# Patient Record
Sex: MALE | Race: WHITE
[De-identification: names, ages, dates, MRNs, and addresses within clinical notes are randomized per-mention and may not be internally consistent; named-entity substitution may affect disease eponyms.]

---

## 2019-10-23 ENCOUNTER — HOSPITAL ENCOUNTER (OUTPATIENT)
Dept: HOSPITAL 11 - JP.ED | Age: 84
Setting detail: OBSERVATION
LOS: 2 days | Discharge: HOME | End: 2019-10-25
Attending: INTERNAL MEDICINE | Admitting: INTERNAL MEDICINE
Payer: MEDICARE

## 2019-10-23 DIAGNOSIS — Z79.899: ICD-10-CM

## 2019-10-23 DIAGNOSIS — M19.90: ICD-10-CM

## 2019-10-23 DIAGNOSIS — Z79.82: ICD-10-CM

## 2019-10-23 DIAGNOSIS — I25.10: ICD-10-CM

## 2019-10-23 DIAGNOSIS — E86.0: ICD-10-CM

## 2019-10-23 DIAGNOSIS — I10: ICD-10-CM

## 2019-10-23 DIAGNOSIS — Z87.891: ICD-10-CM

## 2019-10-23 DIAGNOSIS — K52.9: Primary | ICD-10-CM

## 2019-10-23 PROCEDURE — 71046 X-RAY EXAM CHEST 2 VIEWS: CPT

## 2019-10-23 PROCEDURE — 85025 COMPLETE CBC W/AUTO DIFF WBC: CPT

## 2019-10-23 PROCEDURE — 93010 ELECTROCARDIOGRAM REPORT: CPT

## 2019-10-23 PROCEDURE — 80048 BASIC METABOLIC PNL TOTAL CA: CPT

## 2019-10-23 PROCEDURE — 90662 IIV NO PRSV INCREASED AG IM: CPT

## 2019-10-23 PROCEDURE — C9113 INJ PANTOPRAZOLE SODIUM, VIA: HCPCS

## 2019-10-23 PROCEDURE — 87804 INFLUENZA ASSAY W/OPTIC: CPT

## 2019-10-23 PROCEDURE — 99217: CPT

## 2019-10-23 PROCEDURE — 96374 THER/PROPH/DIAG INJ IV PUSH: CPT

## 2019-10-23 PROCEDURE — 85027 COMPLETE CBC AUTOMATED: CPT

## 2019-10-23 PROCEDURE — 74176 CT ABD & PELVIS W/O CONTRAST: CPT

## 2019-10-23 PROCEDURE — 96361 HYDRATE IV INFUSION ADD-ON: CPT

## 2019-10-23 PROCEDURE — G0008 ADMIN INFLUENZA VIRUS VAC: HCPCS

## 2019-10-23 PROCEDURE — 36415 COLL VENOUS BLD VENIPUNCTURE: CPT

## 2019-10-23 PROCEDURE — 99225: CPT

## 2019-10-23 PROCEDURE — 81001 URINALYSIS AUTO W/SCOPE: CPT

## 2019-10-23 PROCEDURE — 83690 ASSAY OF LIPASE: CPT

## 2019-10-23 PROCEDURE — 84484 ASSAY OF TROPONIN QUANT: CPT

## 2019-10-23 PROCEDURE — 80053 COMPREHEN METABOLIC PANEL: CPT

## 2019-10-23 PROCEDURE — G0378 HOSPITAL OBSERVATION PER HR: HCPCS

## 2019-10-23 PROCEDURE — 93005 ELECTROCARDIOGRAM TRACING: CPT

## 2019-10-23 PROCEDURE — 51702 INSERT TEMP BLADDER CATH: CPT

## 2019-10-23 PROCEDURE — 99285 EMERGENCY DEPT VISIT HI MDM: CPT

## 2019-10-23 PROCEDURE — 99283 EMERGENCY DEPT VISIT LOW MDM: CPT

## 2019-10-23 PROCEDURE — 99218: CPT

## 2019-10-23 RX ADMIN — Medication SCH EACH: at 20:32

## 2019-10-23 RX ADMIN — HYDROCODONE BITARTRATE AND ACETAMINOPHEN PRN TAB: 10; 325 TABLET ORAL at 17:42

## 2019-10-23 RX ADMIN — ONDANSETRON PRN MG: 4 TABLET, ORALLY DISINTEGRATING ORAL at 17:41

## 2019-10-23 RX ADMIN — Medication SCH MG: at 20:32

## 2019-10-23 NOTE — PCM.HP.2
H&P History of Present Illness





- General


Date of Service: 10/23/19


Admit Problem/Dx: 


 Admission Diagnosis/Problem





Admission Diagnosis/Problem      Gastroenteritis








Source of Information: Patient, Provider


History Limitations: Reports: No Limitations





- History of Present Illness


Initial Comments - Free Text/Narative: 





CC: I've had better days





HPI: Al presents today with several days of nasal congestion, shortness of 

breath, diarrhea and upper abdominal pressure. He first got sick on Thursday, 

just shy of one week ago. Initial symptoms were mostly upper respiratory with a 

mild cough and nasal congestion. He has developed mild shortness of breath but 

this seems to be getting better. Over the last 3 days he's had abdominal 

discomfort with a pressure-like pain in the upper abdomen. The pain does not 

radiate. The pain gets worse when he tries to eat and is better with belching 

and fasting. He has been taking pain pills but they don't seem to help the pain 

very much. He has nausea but has not vomited. He has had several watery bowel 

movements each day for the past few days. He has subjective fevers but 

temperatures measured at home have been normal. Appetite has been decreased in 

intake has been decreased. No complaints of chest pain. No change in bladder 

habits. He has mild lower extremity edema which is stable. He did have sick 

contacts with 2 grandchildren who he took care of her in the middle of last 

week before symptoms started.





Laboratory workup in the emergency room was reassuring. Chest x-ray was clear. 

CT of the abdomen and pelvis did show some fluid-filled portions of the small 

intestine concerning for ileus or possibly gastroenteritis. Patient will be 

admitted for hydration, symptom management and observation.





- Related Data


Allergies/Adverse Reactions: 


 Allergies











Allergy/AdvReac Type Severity Reaction Status Date / Time


 


No Known Allergies Allergy   Verified 04/12/16 16:51











Home Medications: 


 Home Meds





Aspirin 325 mg PO DAILY 05/31/14 [History]


Diltiazem HCl [Diltiazem 12Hr ER] 90 mg PO BID 05/31/14 [History]


Lisinopril/Hydrochlorothiazide [Lisinopril-Hctz 20-12.5 mg Tab] 1 each PO BID 05 /31/14 [History]


Metoprolol Succinate [Toprol XL 50mg] 1 tab PO DAILY 05/31/14 [History]


Nitroglycerin 0.4 mg SL ASDIRECTED PRN 05/31/14 [History]


Simvastatin 1 tab PO BEDTIME 05/31/14 [History]


Omeprazole 40 mg PO DAILY 04/12/16 [History]


Hydrocodone/Acetaminophen [Hydrocodon-Acetaminophn ] 2 tab PO Q8H PRN 10/

23/19 [History]


LORazepam 0.5 mg PO Q8H PRN 10/23/19 [History]











Past Medical History


HEENT History: Reports: Impaired Vision


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


Gastrointestinal History: Reports: Diverticulosis


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


Musculoskeletal History: Reports: Arthritis, Back Pain, Chronic


Other Oncologic History: skin


Other Dermatologic History: skin cancer





- Infectious Disease History


Infectious Disease History: Reports: Chicken Pox, Measles, Mumps





- Past Surgical History


Cardiovascular Surgical History: Reports: Valve Replacement, Other (See Below)


GI Surgical History: Reports: Appendectomy, Cholecystectomy, Colonoscopy, EGD, 

Hernia Repair/Other





Social & Family History





- Family History


Cardiac: Reports: CAD, MI


: Reports: Renal Disease/Insufficiency


Oncologic: Reports: Brain





- Tobacco Use


Smoking Status *Q: Former Smoker


Used Tobacco, but Quit: Yes


Month/Year Tobacco Last Used: many years ago





- Caffeine Use


Caffeine Use: Reports: Coffee


Caffeine Use Comment: rarely





- Recreational Drug Use


Recreational Drug Use: No





H&P Review of Systems





- Review of Systems:


Review Of Systems: See Below


Free Text/Narrative: 





A complete 12 point review of systems was obtained.  Pertinent positives and 

negatives are noted in the history of present illness.  All other systems were 

reviewed and were negative except as noted.





Exam





- Exam


Exam: See Below





- Vital Signs


Vital Signs: 


 Last Vital Signs











Temp  36.4 C   10/23/19 10:11


 


Pulse  89   10/23/19 10:39


 


Resp  11 L  10/23/19 10:39


 


BP  143/70 H  10/23/19 10:39


 


Pulse Ox  97   10/23/19 10:39











Weight: 61.235 kg





- Exam


Quality Assessment: No: Supplemental Oxygen


General: Alert, Oriented, Cooperative.  No: Mild Distress


HEENT: Pupils Equal.  No: Mucosa Moist & Pink (dry), Scleral Icterus


Neck: Supple, Trachea Midline.  No: Lymphadenopathy


Lungs: Clear to Auscultation, Normal Respiratory Effort


Cardiovascular: Regular Rate, Regular Rhythm, Systolic Murmur


GI/Abdominal Exam: Normal Bowel Sounds, Soft, No Distention, Tender (moderate, 

generalized )


Back Exam: Normal Inspection, Full Range of Motion


Extremities: Pedal Edema.  No: Increased Warmth


Skin: Warm, Dry, Wound (2 cm scab on left anterior shin )


Neuro Extensive - Mental Status: Alert, Oriented x3, Nl Response to Commands


Neuro Extensive - Motor, Sensory, Reflexes: No: Dysarthria, Abnormal Motor, 

Tremor


Psychiatric: Alert, Normal Affect





- Patient Data


Lab Results Last 24 hrs: 


 Laboratory Results - last 24 hr











  10/23/19 10/23/19 10/23/19 Range/Units





  10:35 10:50 11:05 


 


WBC  9.5    (4.5-11.0)  K/uL


 


RBC  3.93 L    (4.30-5.90)  M/uL


 


Hgb  11.6 L    (12.0-15.0)  g/dL


 


Hct  35.6 L    (40.0-54.0)  %


 


MCV  91    (80-98)  fL


 


MCH  30    (27-31)  pg


 


MCHC  33    (32-36)  %


 


Plt Count  235    (150-400)  K/uL


 


Neut % (Auto)  82 H    (36-66)  %


 


Lymph % (Auto)  7 L    (24-44)  %


 


Mono % (Auto)  11 H    (2-6)  %


 


Eos % (Auto)  0 L    (2-4)  %


 


Baso % (Auto)  0    (0-1)  %


 


Sodium   133 L   (140-148)  mmol/L


 


Potassium   3.9   (3.6-5.2)  mmol/L


 


Chloride   95 L   (100-108)  mmol/L


 


Carbon Dioxide   28   (21-32)  mmol/L


 


Anion Gap   13.9   (5.0-14.0)  mmol/L


 


BUN   17   (7-18)  mg/dL


 


Creatinine   1.2   (0.8-1.3)  mg/dL


 


Est Cr Clr Drug Dosing   38.98   mL/min


 


Estimated GFR (MDRD)   58 L   (>60)  


 


Glucose   127 H   ()  mg/dL


 


Calcium   9.1   (8.5-10.1)  mg/dL


 


Total Bilirubin   1.5 H D   (0.2-1.0)  mg/dL


 


AST   18   (15-37)  U/L


 


ALT   16   (12-78)  U/L


 


Alkaline Phosphatase   83   ()  U/L


 


Troponin I   < 0.017   (0.000-0.056)  ng/mL


 


Total Protein   7.5   (6.4-8.2)  g/dL


 


Albumin   3.7   (3.4-5.0)  g/dL


 


Globulin   3.8 H   (2.3-3.5)  g/dL


 


Albumin/Globulin Ratio   1.0 L   (1.2-2.2)  


 


Lipase   55 L   ()  U/L


 


Urine Color    Yellow  (YELLOW)  


 


Urine Appearance    Clear  (CLEAR)  


 


Urine pH    7.0  (5.0-8.0)  


 


Ur Specific Gravity    1.015  (1.008-1.030)  


 


Urine Protein    Negative  (NEGATIVE)  mg/dL


 


Urine Glucose (UA)    Negative  (NEGATIVE)  mg/dL


 


Urine Ketones    Negative  (NEGATIVE)  mg/dL


 


Urine Occult Blood    Small H  (NEGATIVE)  


 


Urine Nitrite    Negative  (NEGATIVE)  


 


Urine Bilirubin    Negative  (NEGATIVE)  


 


Urine Urobilinogen    0.2  (0.2-1.0)  EU/dL


 


Ur Leukocyte Esterase    Negative  (NEGATIVE)  


 


Urine RBC    0-5  (0-5)  


 


Urine WBC    Not seen  (0-5)  


 


Ur Epithelial Cells    Rare  


 


Amorphous Sediment    Not seen  


 


Urine Bacteria    Not seen  


 


Urine Mucus    Not seen  











Result Diagrams: 


 10/23/19 10:35





 10/23/19 10:50


Andreas Results Last 24 hrs: 


 Microbiology











 10/23/19 10:42 Influenza Type A Antigen Screen - Final





 Nasal, Unspecified    NEGATIVE INFLUENZA A VIRUS AG





    REFERENCE RANGE: NEGATIVE





 Influenza Type B Antigen Screen - Final





    NEGATIVE INFLUENZA B VIRUS AG





    REFERENCE RANGE: NEGATIVE











Imaging Impressions Last 24 hrs: 





CXR - images personally reviewed - lungs are clear, no mass, infiltrate or 

effusion. s/p sternotomy 





EKG INTERPRETATION


EKG Date: 10/23/19


Rhythm: NSR


Rate (Beats/Min): 83


Axis: RAD-Right Axis Deviation


QRS: RBBB


ST-T: Normal


QT: Normal


Comparison: No Change





*Q Meaningful Use (ADM)





- VTE Risk Assess *Q


Each Risk Factor Represents 1 Point: Swollen Legs, Current


Total Score 1 Point Risk Factors: 1


Each Risk Factor Represents 2 Points: None


Total Score 2 Point Risk Factors: 0


Each Risk Factor Represents 3 Points: Age 75 Years or Greater


Total Score 3 Point Risk Factors: 3


Each Risk Factor Represents 5 Points: None


Total Score 5 Point Risk Factors: 0


Venous Thromboembolism Risk Factor Score *Q: 4





- Problem List


(1) Gastroenteritis


SNOMED Code(s): 35920514


   ICD Code: K52.9 - NONINFECTIVE GASTROENTERITIS AND COLITIS, UNSPECIFIED   

Status: Acute   Current Visit: Yes   


Problem List Initiated/Reviewed/Updated: Yes


Orders Last 24hrs: 


 Active Orders 24 hr











 Category Date Time Status


 


 Patient Status Manage Transfer [TRANSFER] Routine ADT  10/23/19 13:31 Active


 


 EKG Documentation Completion [RC] ASDIRECTED Care  10/23/19 10:36 Active


 


 Sodium Chloride 0.9% [Normal Saline] 1,000 ml Med  10/23/19 13:45 Active





 IV ASDIRECTED   


 


 Resuscitation Status Routine Resus Stat  10/23/19 13:32 Ordered


 


 EKG 12 Lead [EK] Routine Ther  10/23/19 10:35 Ordered








 Medication Orders





Sodium Chloride (Normal Saline)  1,000 mls @ 125 mls/hr IV ASDIRECTED REGINO








Assessment/Plan Comment:: 





ASSESSMENT AND PLAN - 





Gastroenteritis - manifestations including nausea and diarrhea as well as 

abdominal pain. Labs are reassuring. CT scan consistent with gastroenteritis 

versus possibly ileus. Patient is dehydrated but not acutely ill and vitals are 

otherwise stable.


-IV fluid hydration


-Pain control


-Nausea management


-Advance diet as tolerated





Coronary artery disease - long-standing history. No active symptoms. Troponin 

normal.


-Continue medical management





Maintenance issues - 


- DVT prophylaxis - CYNTHIA stockings


- GI prophylaxis - PPI


- Nutrition - mechanical soft


- Pastor catheter - not indicated





CODE STATUS - DNR/DNI





Admission justification - patient will be referred observation status for 

hydration and symptom management





Disposition - I would anticipate discharge home after the hospital stay





Primary care physician - Dr Jamaal Gimenez M.D.





- Mortality Measure


Prognosis:: Good

## 2019-10-23 NOTE — CRLCR
INDICATION: Dyspnea



Indication:



Dyspnea. 



Technique:



Chest, two views. 



Comparison:



12/29/2016. 



Findings:



Median sternotomy changes, with mediastinal surgical clips, compatible with 

prior CABG. There is diffuse pulmonary hyperinflation. There is no acute 

airspace disease. There is no pneumothorax. Lateral/posterior costophrenic 

sulci are sharp. Bowel gas pattern is normal in the upper abdomen. Heart 

size and pulmonary vasculature are normal. 



Impression:



1. Diffuse pulmonary hyperinflation. 



2. No acute airspace disease.



Dictated by Rickey Richardson MD @ 10/23/2019 11:01:22 AM



Dictated by: Rickey Richardson MD @ 10/23/2019 11:01:46



(Electronically Signed)

## 2019-10-23 NOTE — CRLCT
INDICATION:



Abdominal pain.



TECHNIQUE:



Noncontrast CT abdomen and pelvis. Coronal and sagittal re-formatted images 

obtained.



Comparison: No comparison studies are available 



Findings: 



The heart size is normal. There is no pericardial effusion or pleural 

effusion. Basilar strandy atelectasis.



The unenhanced spleen, pancreas, adrenal glands unenhanced liver appears 

unremarkable. Cholecystectomy there is biliary dilatation.



Left kidney appears atrophic. Nonobstructing small renal calculi on the 

right. No hydronephrosis.



Diverticulosis. Multiple gas and fluid filled mildly distended small bowel 

loops. No transition point identified. Findings probably reflect ileus. 



Multiple surgical clips in the in the right inguinal region could be 

related to prior inguinal hernia repair. No suspicious bony lesions. 



Enlarged prostate gland measuring 6.6 centimeters. Trabeculated appearance 

of the urinary bladder with multiple small diverticula. No suspicious bony 

lesions. 



Impression :



1. Diffuse scattered fluid-filled mildly distended small bowel loops. No 

transition point identified findings probably reflect ileus. Repeat imaging 

could be performed if patient`s symptoms worsen.



2. Diverticulosis .



3. Enlarged prostate gland with trabeculated appearance of the urinary 

bladder with multiple small diverticula likely related to bladder outlet 

obstruction



Please note that all CT scans at this facility use dose modulation, 

iterative reconstruction, and/or weight-based dosing when appropriate to 

reduce radiation dose to as low as reasonably achievable.



Dictated by Ro Ward MD @ Oct 23 2019 12:07PM



Signed by Dr. Ro Ward @ Oct 23 2019 12:18PM

## 2019-10-23 NOTE — EDM.PDOC
ED HPI GENERAL MEDICAL PROBLEM





- General


Chief Complaint: General


Stated Complaint: MEDICAL VIA NORTH


Time Seen by Provider: 10/23/19 10:25


Source of Information: Reports: Patient, EMS


History Limitations: Reports: No Limitations





- History of Present Illness


INITIAL COMMENTS - FREE TEXT/NARRATIVE: 





85-year-old male brought in by EMS this morning after struggling with GI 

symptoms such as nausea, diarrhea, abdominal cramps and also cold symptoms with 

cough, runny nose for the past 2-3 days but today developed some chest pressure 

which scared him. He called EMS, and EKG looks stable from previous EKGs. He 

had taken some nitroglycerin prior to EMS arriving and was feeling better but 

had a few episodes in route of brief epigastric and substernal fullness with no 

change in vitals or EKG monitoring. While visiting with the patient he had 

another episode of one to two minutes of a fullness substernally. He felt 

uncomfortable yesterday with "sweats, I felt hot".


Onset: Gradual


Duration: Day(s): (Intermittent symptoms for the past 3-4 days)


Location: Reports: Chest, Abdomen


Associated Symptoms: Reports: Chest Pain, Fever/Chills, Malaise, Shortness of 

Breath, Other (Rhinitis, diarrhea)


  ** Lower Back


Pain Score (Numeric/FACES): 8





- Related Data


 Allergies











Allergy/AdvReac Type Severity Reaction Status Date / Time


 


No Known Allergies Allergy   Verified 04/12/16 16:51











Home Meds: 


 Home Meds





Aspirin 325 mg PO DAILY 05/31/14 [History]


Diltiazem HCl [Diltiazem 12Hr ER] 90 mg PO BID 05/31/14 [History]


Lisinopril/Hydrochlorothiazide [Lisinopril-Hctz 20-12.5 mg Tab] 1 each PO BID 05 /31/14 [History]


Metoprolol Succinate [Toprol XL 50mg] 1 tab PO DAILY 05/31/14 [History]


Nitroglycerin 0.4 mg SL ASDIRECTED PRN 05/31/14 [History]


Simvastatin 1 tab PO BEDTIME 05/31/14 [History]


Omeprazole 40 mg PO DAILY 04/12/16 [History]


Hydrocodone/Acetaminophen [Hydrocodon-Acetaminophn ] 2 tab PO Q8H PRN 10/

23/19 [History]


LORazepam 0.5 mg PO Q8H PRN 10/23/19 [History]











Past Medical History


HEENT History: Reports: Impaired Vision


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


Gastrointestinal History: Reports: Diverticulosis


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


Musculoskeletal History: Reports: Arthritis, Back Pain, Chronic


Other Oncologic History: skin


Other Dermatologic History: skin cancer





- Infectious Disease History


Infectious Disease History: Reports: Chicken Pox, Measles, Mumps





- Past Surgical History


Cardiovascular Surgical History: Reports: Valve Replacement, Other (See Below)


GI Surgical History: Reports: Appendectomy, Cholecystectomy, Colonoscopy, EGD, 

Hernia Repair/Other





Social & Family History





- Family History


Cardiac: Reports: CAD, MI


: Reports: Renal Disease/Insufficiency


Oncologic: Reports: Brain





- Tobacco Use


Smoking Status *Q: Former Smoker


Used Tobacco, but Quit: Yes


Month/Year Tobacco Last Used: many years ago





- Caffeine Use


Caffeine Use: Reports: Coffee


Caffeine Use Comment: rarely





- Recreational Drug Use


Recreational Drug Use: No





ED ROS GENERAL





- Review of Systems


Review Of Systems: See Below


Constitutional: Reports: Fever, Chills, Malaise


HEENT: Reports: Rhinitis.  Denies: Throat Pain


Respiratory: Reports: Shortness of Breath, Cough.  Denies: Sputum


Cardiovascular: Reports: Chest Pain (Substernal pressure)


GI/Abdominal: Reports: Abdominal Pain, Diarrhea, Nausea


: Reports: No Symptoms


Skin: Reports: Other (Healing chronic lesions on his lower extremities)


Neurological: Denies: Headache, Paresthesia


Psychiatric: Reports: No Symptoms





ED EXAM, GENERAL





- Physical Exam


Exam: See Below


Exam Limited By: No Limitations


General Appearance: Alert, No Apparent Distress


Neck: Non-Tender


Respiratory/Chest: No Respiratory Distress, Lungs Clear


Cardiovascular: Regular Rate, Rhythm, Systolic Murmur (Significant 3/6 systolic 

murmur)


GI/Abdominal: Normal Bowel Sounds, Tender (Diffuse tenderness and mild 

distention of the abdomen on palpation)


Extremities: Other (1+ symmetric pitting edema with healing dry ulcerations 

around the ankles)


Neurological: Alert, Oriented


Psychiatric: Normal Affect, Normal Mood





EKG INTERPRETATION


Rhythm: NSR


QRS: RBBB


EKG Interpretation Comments: 





EKG looks identical to 3 years ago





Course





- Vital Signs


Last Recorded V/S: 


 Last Vital Signs











Temp  99.4 F   10/23/19 15:27


 


Pulse  88   10/23/19 15:27


 


Resp  18   10/23/19 15:27


 


BP  155/65 H  10/23/19 15:27


 


Pulse Ox  99   10/23/19 15:27














- Orders/Labs/Meds


Orders: 


 Active Orders 24 hr











 Category Date Time Status


 


 EKG Documentation Completion [RC] ASDIRECTED Care  10/23/19 10:36 Active


 


 EKG 12 Lead [EK] Routine Ther  10/23/19 10:35 Ordered








 Medication Orders





Sodium Chloride (Normal Saline)  1,000 mls @ 125 mls/hr IV ASDIRECTED REGINO


   Last Admin: 10/23/19 13:48  Dose: 125 mls/hr








Labs: 


 Laboratory Tests











  10/23/19 10/23/19 10/23/19 Range/Units





  10:35 10:50 11:05 


 


WBC  9.5    (4.5-11.0)  K/uL


 


RBC  3.93 L    (4.30-5.90)  M/uL


 


Hgb  11.6 L    (12.0-15.0)  g/dL


 


Hct  35.6 L    (40.0-54.0)  %


 


MCV  91    (80-98)  fL


 


MCH  30    (27-31)  pg


 


MCHC  33    (32-36)  %


 


Plt Count  235    (150-400)  K/uL


 


Neut % (Auto)  82 H    (36-66)  %


 


Lymph % (Auto)  7 L    (24-44)  %


 


Mono % (Auto)  11 H    (2-6)  %


 


Eos % (Auto)  0 L    (2-4)  %


 


Baso % (Auto)  0    (0-1)  %


 


Sodium   133 L   (140-148)  mmol/L


 


Potassium   3.9   (3.6-5.2)  mmol/L


 


Chloride   95 L   (100-108)  mmol/L


 


Carbon Dioxide   28   (21-32)  mmol/L


 


Anion Gap   13.9   (5.0-14.0)  mmol/L


 


BUN   17   (7-18)  mg/dL


 


Creatinine   1.2   (0.8-1.3)  mg/dL


 


Est Cr Clr Drug Dosing   38.98   mL/min


 


Estimated GFR (MDRD)   58 L   (>60)  


 


Glucose   127 H   ()  mg/dL


 


Calcium   9.1   (8.5-10.1)  mg/dL


 


Total Bilirubin   1.5 H D   (0.2-1.0)  mg/dL


 


AST   18   (15-37)  U/L


 


ALT   16   (12-78)  U/L


 


Alkaline Phosphatase   83   ()  U/L


 


Troponin I   < 0.017   (0.000-0.056)  ng/mL


 


Total Protein   7.5   (6.4-8.2)  g/dL


 


Albumin   3.7   (3.4-5.0)  g/dL


 


Globulin   3.8 H   (2.3-3.5)  g/dL


 


Albumin/Globulin Ratio   1.0 L   (1.2-2.2)  


 


Lipase   55 L   ()  U/L


 


Urine Color    Yellow  (YELLOW)  


 


Urine Appearance    Clear  (CLEAR)  


 


Urine pH    7.0  (5.0-8.0)  


 


Ur Specific Gravity    1.015  (1.008-1.030)  


 


Urine Protein    Negative  (NEGATIVE)  mg/dL


 


Urine Glucose (UA)    Negative  (NEGATIVE)  mg/dL


 


Urine Ketones    Negative  (NEGATIVE)  mg/dL


 


Urine Occult Blood    Small H  (NEGATIVE)  


 


Urine Nitrite    Negative  (NEGATIVE)  


 


Urine Bilirubin    Negative  (NEGATIVE)  


 


Urine Urobilinogen    0.2  (0.2-1.0)  EU/dL


 


Ur Leukocyte Esterase    Negative  (NEGATIVE)  


 


Urine RBC    0-5  (0-5)  


 


Urine WBC    Not seen  (0-5)  


 


Ur Epithelial Cells    Rare  


 


Amorphous Sediment    Not seen  


 


Urine Bacteria    Not seen  


 


Urine Mucus    Not seen  











Meds: 


Medications











Generic Name Dose Route Start Last Admin





  Trade Name Freq  PRN Reason Stop Dose Admin


 


Sodium Chloride  1,000 mls @ 125 mls/hr  10/23/19 13:45  10/23/19 13:48





  Normal Saline  IV   125 mls/hr





  ASDIRECTED REGINO   Administration





     





     





     





     














Discontinued Medications














Generic Name Dose Route Start Last Admin





  Trade Name Freq  PRN Reason Stop Dose Admin


 


Fentanyl  50 mcg  10/23/19 14:03  10/23/19 14:07





  Sublimaze  IVPUSH  10/23/19 14:04  50 mcg





  ONETIME ONE   Administration





     





     





     





     














- Re-Assessments/Exams


Free Text/Narrative Re-Assessment/Exam: 





10/23/19 10:47


EKG is stable from 3 years ago and shows no acute ST findings. CBC, CMP, 

troponin and lipase were obtained as well as influenza antigens. Two-view chest 

x-ray will be obtained, likely will need to evaluate the abdomen with a CT 

scan. No acute medications or treatment is needed at this time.


10/23/19 11:20


Chest x-ray is negative for acute findings, labs are reassuring with a negative 

troponin, normal electrolytes and white count. Abdomen and pelvis CT without 

contrast was obtained. UA was also negative.


10/23/19 12:09


Influences were also negative. There does appear to be a large amount of small 

and large bowel air on the CT scan, awaiting official report.


10/23/19 12:34


CT report


Impression :


1. Diffuse scattered fluid-filled mildly distended small bowel loops. No 

transition point identified findings probably reflect ileus. Repeat imaging 

could be performed if patient`s symptoms worsen.


2. Diverticulosis .


3. Enlarged prostate gland with trabeculated appearance of the urinary bladder 

with multiple small diverticula likely related to bladder outlet obstruction





Above findings were discussed with Dr. Gimenez of the hospitalist service after 

the patient related that he was concerned about going home with his current 

symptoms. He'll be assessed for admission for gastroenteritis and ileus





Departure





- Departure


Time of Disposition: 14:20


Disposition: Admitted As Inpatient 66


Clinical Impression: 


 Gastroenteritis, Ileus





Abdominal pain


Qualifiers:


 Abdominal location: generalized Qualified Code(s): R10.84 - Generalized 

abdominal pain








- Discharge Information





- My Orders


Last 24 Hours: 


My Active Orders





10/23/19 10:35


EKG 12 Lead [EK] Routine 





10/23/19 10:36


EKG Documentation Completion [RC] ASDIRECTED 














- Assessment/Plan


Last 24 Hours: 


My Active Orders





10/23/19 10:35


EKG 12 Lead [EK] Routine 





10/23/19 10:36


EKG Documentation Completion [RC] ASDIRECTED

## 2019-10-24 RX ADMIN — Medication SCH EACH: at 20:16

## 2019-10-24 RX ADMIN — HYDROCODONE BITARTRATE AND ACETAMINOPHEN PRN TAB: 10; 325 TABLET ORAL at 17:16

## 2019-10-24 RX ADMIN — Medication SCH MG: at 09:17

## 2019-10-24 RX ADMIN — Medication SCH MG: at 20:16

## 2019-10-24 RX ADMIN — Medication SCH EACH: at 09:17

## 2019-10-24 RX ADMIN — ONDANSETRON PRN MG: 4 TABLET, ORALLY DISINTEGRATING ORAL at 21:29

## 2019-10-24 RX ADMIN — HYDROCODONE BITARTRATE AND ACETAMINOPHEN PRN TAB: 10; 325 TABLET ORAL at 07:17

## 2019-10-24 RX ADMIN — ASPIRIN SCH MG: 325 TABLET, DELAYED RELEASE ORAL at 09:16

## 2019-10-24 NOTE — PCM.PN
- General Info


Date of Service: 10/24/19


Subjective Update: 





No acute events overnight. No fevers overnight. Abdominal pain has improved 

significantly but not resolved. He did have some diarrhea last night but none 

this morning. He did have urinary retention last night and a Pastor catheter was 

placed. Appetite improving. Strength is improving.


Functional Status: Reports: Pain Controlled, Tolerating Diet





- Review of Systems


General: Reports: Weakness.  Denies: Fever


Gastrointestinal: Reports: Abdominal Pain





- Patient Data


Vitals - Most Recent: 


 Last Vital Signs











Temp  37.5 C   10/24/19 07:38


 


Pulse  73   10/24/19 09:33


 


Resp  16   10/24/19 07:38


 


BP  142/70 H  10/24/19 09:33


 


Pulse Ox  93 L  10/24/19 07:38











Weight - Most Recent: 57.878 kg


I&O - Last 24 Hours: 


 Intake & Output











 10/23/19 10/24/19 10/24/19





 22:59 06:59 14:59


 


Intake Total  2025 480


 


Output Total  1600 


 


Balance  425 480











Lab Results Last 24 Hours: 


 Laboratory Results - last 24 hr











  10/23/19 10/23/19 10/23/19 Range/Units





  10:35 10:50 11:05 


 


WBC  9.5    (4.5-11.0)  K/uL


 


RBC  3.93 L    (4.30-5.90)  M/uL


 


Hgb  11.6 L    (12.0-15.0)  g/dL


 


Hct  35.6 L    (40.0-54.0)  %


 


MCV  91    (80-98)  fL


 


MCH  30    (27-31)  pg


 


MCHC  33    (32-36)  %


 


Plt Count  235    (150-400)  K/uL


 


Neut % (Auto)  82 H    (36-66)  %


 


Lymph % (Auto)  7 L    (24-44)  %


 


Mono % (Auto)  11 H    (2-6)  %


 


Eos % (Auto)  0 L    (2-4)  %


 


Baso % (Auto)  0    (0-1)  %


 


Sodium   133 L   (140-148)  mmol/L


 


Potassium   3.9   (3.6-5.2)  mmol/L


 


Chloride   95 L   (100-108)  mmol/L


 


Carbon Dioxide   28   (21-32)  mmol/L


 


Anion Gap   13.9   (5.0-14.0)  mmol/L


 


BUN   17   (7-18)  mg/dL


 


Creatinine   1.2   (0.8-1.3)  mg/dL


 


Est Cr Clr Drug Dosing   38.98   mL/min


 


Estimated GFR (MDRD)   58 L   (>60)  


 


Glucose   127 H   ()  mg/dL


 


Calcium   9.1   (8.5-10.1)  mg/dL


 


Total Bilirubin   1.5 H D   (0.2-1.0)  mg/dL


 


AST   18   (15-37)  U/L


 


ALT   16   (12-78)  U/L


 


Alkaline Phosphatase   83   ()  U/L


 


Troponin I   < 0.017   (0.000-0.056)  ng/mL


 


Total Protein   7.5   (6.4-8.2)  g/dL


 


Albumin   3.7   (3.4-5.0)  g/dL


 


Globulin   3.8 H   (2.3-3.5)  g/dL


 


Albumin/Globulin Ratio   1.0 L   (1.2-2.2)  


 


Lipase   55 L   ()  U/L


 


Urine Color    Yellow  (YELLOW)  


 


Urine Appearance    Clear  (CLEAR)  


 


Urine pH    7.0  (5.0-8.0)  


 


Ur Specific Gravity    1.015  (1.008-1.030)  


 


Urine Protein    Negative  (NEGATIVE)  mg/dL


 


Urine Glucose (UA)    Negative  (NEGATIVE)  mg/dL


 


Urine Ketones    Negative  (NEGATIVE)  mg/dL


 


Urine Occult Blood    Small H  (NEGATIVE)  


 


Urine Nitrite    Negative  (NEGATIVE)  


 


Urine Bilirubin    Negative  (NEGATIVE)  


 


Urine Urobilinogen    0.2  (0.2-1.0)  EU/dL


 


Ur Leukocyte Esterase    Negative  (NEGATIVE)  


 


Urine RBC    0-5  (0-5)  


 


Urine WBC    Not seen  (0-5)  


 


Ur Epithelial Cells    Rare  


 


Amorphous Sediment    Not seen  


 


Urine Bacteria    Not seen  


 


Urine Mucus    Not seen  














  10/24/19 10/24/19 Range/Units





  04:24 04:24 


 


WBC  9.6   (4.5-11.0)  K/uL


 


RBC  3.84 L   (4.30-5.90)  M/uL


 


Hgb  11.1 L   (12.0-15.0)  g/dL


 


Hct  34.8 L   (40.0-54.0)  %


 


MCV  91   (80-98)  fL


 


MCH  29   (27-31)  pg


 


MCHC  32   (32-36)  %


 


Plt Count  224   (150-400)  K/uL


 


Neut % (Auto)    (36-66)  %


 


Lymph % (Auto)    (24-44)  %


 


Mono % (Auto)    (2-6)  %


 


Eos % (Auto)    (2-4)  %


 


Baso % (Auto)    (0-1)  %


 


Sodium   138 L  (140-148)  mmol/L


 


Potassium   3.6  (3.6-5.2)  mmol/L


 


Chloride   102  (100-108)  mmol/L


 


Carbon Dioxide   27  (21-32)  mmol/L


 


Anion Gap   12.6  (5.0-14.0)  mmol/L


 


BUN   13  (7-18)  mg/dL


 


Creatinine   1.1  (0.8-1.3)  mg/dL


 


Est Cr Clr Drug Dosing   40.19  mL/min


 


Estimated GFR (MDRD)   > 60  (>60)  


 


Glucose   113 H  ()  mg/dL


 


Calcium   8.7  (8.5-10.1)  mg/dL


 


Total Bilirubin    (0.2-1.0)  mg/dL


 


AST    (15-37)  U/L


 


ALT    (12-78)  U/L


 


Alkaline Phosphatase    ()  U/L


 


Troponin I    (0.000-0.056)  ng/mL


 


Total Protein    (6.4-8.2)  g/dL


 


Albumin    (3.4-5.0)  g/dL


 


Globulin    (2.3-3.5)  g/dL


 


Albumin/Globulin Ratio    (1.2-2.2)  


 


Lipase    ()  U/L


 


Urine Color    (YELLOW)  


 


Urine Appearance    (CLEAR)  


 


Urine pH    (5.0-8.0)  


 


Ur Specific Gravity    (1.008-1.030)  


 


Urine Protein    (NEGATIVE)  mg/dL


 


Urine Glucose (UA)    (NEGATIVE)  mg/dL


 


Urine Ketones    (NEGATIVE)  mg/dL


 


Urine Occult Blood    (NEGATIVE)  


 


Urine Nitrite    (NEGATIVE)  


 


Urine Bilirubin    (NEGATIVE)  


 


Urine Urobilinogen    (0.2-1.0)  EU/dL


 


Ur Leukocyte Esterase    (NEGATIVE)  


 


Urine RBC    (0-5)  


 


Urine WBC    (0-5)  


 


Ur Epithelial Cells    


 


Amorphous Sediment    


 


Urine Bacteria    


 


Urine Mucus    











Andreas Results Last 24 Hours: 


 Microbiology











 10/23/19 10:42 Influenza Type A Antigen Screen - Final





 Nasal, Unspecified    NEGATIVE INFLUENZA A VIRUS AG





    REFERENCE RANGE: NEGATIVE





 Influenza Type B Antigen Screen - Final





    NEGATIVE INFLUENZA B VIRUS AG





    REFERENCE RANGE: NEGATIVE











Med Orders - Current: 


 Current Medications





Hydrocodone Bitart/Acetaminophen (Norco 325-10 Mg)  2 tab PO Q8H PRN


   PRN Reason: Pain


   Last Admin: 10/24/19 07:17 Dose:  2 tab


Aspirin (Ecotrin)  325 mg PO DAILY [facility]


   Last Admin: 10/24/19 09:16 Dose:  325 mg


Sodium Chloride (Normal Saline)  1,000 mls @ 125 mls/hr IV ASDIRECTED [facility]


   Last Admin: 10/24/19 07:18 Dose:  125 mls/hr


Lorazepam (Ativan)  0.5 mg IVPUSH Q4H PRN


   PRN Reason: Nausea/Vomiting


   Last Admin: 10/23/19 18:38 Dose:  0.5 mg


Lorazepam (Ativan)  0.5 mg PO Q8H PRN


   PRN Reason: Anxiety


Melatonin (Melatonin)  9 mg PO BEDTIME PRN


   PRN Reason: Sleep


Metoprolol Succinate (Toprol Xl)  50 mg PO DAILY [facility]


   Last Admin: 10/24/19 09:33 Dose:  50 mg


Diltiazem Hcl [ Diltiazem 12hr Er] 90 Mg*Pom*  90 mg PO BID [facility]


   Last Admin: 10/24/19 09:17 Dose:  90 mg


Lisinopril/Hydrochlorothiazide( Lisinopril-Hctz) 20- 12.5 *Pom*  1 each PO BID 

[facility]


   Last Admin: 10/24/19 09:17 Dose:  1 each


Non-Formulary Medication (Simvastatin [Simvastatin])  1 tab PO BEDTIME [facility]


Ondansetron HCl (Zofran Odt)  4 mg PO Q6H PRN


   PRN Reason: Nausea able to take PO


   Last Admin: 10/23/19 17:41 Dose:  4 mg


Ondansetron HCl (Zofran)  4 mg IV Q6H PRN


   PRN Reason: Nausea/Vomiting


Pantoprazole Sodium (Protonix***)  40 mg PO DAILY [facility]


   Last Admin: 10/24/19 09:33 Dose:  40 mg





Discontinued Medications





Fentanyl (Sublimaze)  50 mcg IVPUSH ONETIME ONE


   Stop: 10/23/19 14:04


   Last Admin: 10/23/19 14:07 Dose:  50 mcg


Pantoprazole Sodium (Protonix Iv***)  40 mg IV ONETIME ONE


   Stop: 10/23/19 16:31


   Last Admin: 10/23/19 17:38 Dose:  40 mg


Potassium Chloride (Klor-Con M20)  40 meq PO ONETIME ONE


   Stop: 10/24/19 09:31


   Last Admin: 10/24/19 09:16 Dose:  40 meq


Tamsulosin HCl (Flomax)  0.4 mg PO ONETIME ONE


   Stop: 10/24/19 09:10


   Last Admin: 10/24/19 09:16 Dose:  0.4 mg











- Exam


Quality Assessment: No: Supplemental Oxygen


General: Alert, Oriented, Cooperative, No Acute Distress


Lungs: Normal Respiratory Effort


Cardiovascular: Regular Rate, Regular Rhythm


GI/Abdominal Exam: Soft, No Distention, Tender (very mild on right side )


Extremities: Pedal Edema.  No: Increased Warmth


Skin: Warm, Dry, Other (scab left lower anterior shin)


Psy/Mental Status: Alert, Normal Affect





- Problem List & Annotations


(1) Gastroenteritis


SNOMED Code(s): 68975845


   Code(s): K52.9 - NONINFECTIVE GASTROENTERITIS AND COLITIS, UNSPECIFIED   

Status: Acute   Current Visit: Yes   





- Problem List Review


Problem List Initiated/Reviewed/Updated: Yes





- My Orders


Last 24 Hours: 


My Active Orders





10/23/19 13:32


Resuscitation Status Routine 





10/23/19 13:45


Sodium Chloride 0.9% [Normal Saline] 1,000 ml IV ASDIRECTED 





10/23/19 15:55


Patient Status [ADT] Routine 


Intake and Output [RC] QSHIFT 


Notify Provider Vital Signs [RC] ASDIRECTED 


Oxygen Therapy [RC] PRN 


Up With Assistance [RC] ASDIRECTED 


VTE/DVT Education [RC] Per Unit Routine 


Vital Signs [RC] Q4H 


Acetaminophen/HYDROcodone [Norco 325-10 MG]   2 tab PO Q8H PRN 


LORazepam [Ativan]   0.5 mg IVPUSH Q4H PRN 


LORazepam [Ativan]   0.5 mg PO Q8H PRN 


Melatonin   9 mg PO BEDTIME PRN 


Ondansetron [Zofran ODT]   4 mg PO Q6H PRN 


Ondansetron [Zofran]   4 mg IV Q6H PRN 


Antiembolic Hose [OM.PC] Routine 





10/23/19 21:00


Diltiazem HCl [Diltiazem 12Hr ER]   90 mg PO BID 


Lisinopril/Hydrochlorothiazide [Lisinopril-Hctz 20-12.5 mg Tab]   1 each PO BID 


Simvastatin [Simvastatin]   1 tab PO BEDTIME 





10/23/19 Dinner


Mechanical Soft Diet [DIET] 





10/24/19 09:00


Aspirin [Ecotrin]   325 mg PO DAILY 


Metoprolol Succinate [Toprol XL]   50 mg PO DAILY 


Pantoprazole [ProTONIX***]   40 mg PO DAILY 





10/24/19 10:22


Peripheral IV Discontinue [OM.PC] Routine 





10/24/19 13:00


DC Pastor Catheter [Urinary Catheter Removal] [RC] Per Unit Routine 





10/25/19 05:00


BASIC METABOLIC PANEL,BMP [CHEM] Timed 














- Plan


Plan:: 





ASSESSMENT AND PLAN - 





Gastroenteritis - manifestations including nausea and diarrhea as well as 

abdominal pain. Clinically much better. Vitals are stable. Little weak but 

otherwise doing fairly well.


-Encourage oral intake


-Dietary supplements


-Pain control


-Nausea management


-Advance diet as tolerated





Acute urinary retention with BPH - Pastor catheter was placed last night because 

of inability to void.


-Tamsulosin 1 this morning


-Removed catheter this afternoon





Coronary artery disease - long-standing history. No active symptoms. Troponin 

normal.


-Continue medical management





Maintenance issues - 


- DVT prophylaxis - CYNTHIA stockings


- GI prophylaxis - PPI


- Nutrition - mechanical soft


- Pastor catheter - placed last night because of urinary retention, plan to 

remove this afternoon





Admission justification - patient will be referred observation status for 

hydration and symptom management





Disposition - I would anticipate discharge home after the hospital stay





Primary care physician - Dr Jamaal Gimenez M.D.

## 2019-10-25 VITALS — DIASTOLIC BLOOD PRESSURE: 54 MMHG | SYSTOLIC BLOOD PRESSURE: 126 MMHG

## 2019-10-25 VITALS — HEART RATE: 84 BPM

## 2019-10-25 RX ADMIN — Medication SCH EACH: at 08:43

## 2019-10-25 RX ADMIN — HYDROCODONE BITARTRATE AND ACETAMINOPHEN PRN TAB: 10; 325 TABLET ORAL at 03:46

## 2019-10-25 RX ADMIN — ASPIRIN SCH MG: 325 TABLET, DELAYED RELEASE ORAL at 08:43

## 2019-10-25 RX ADMIN — Medication SCH MG: at 08:42

## 2019-10-25 NOTE — [id number]
**Discharge Summary





- Hospital Course


Brief History: 85-year-old male with history of coronary artery disease who 

presented with rhinitis, abdominal pain, nausea and diarrhea. He was admitted 

for management of dehydration with presumed viral gastroenteritis.


Diagnosis: Stroke: No





- Discharge Data


Discharge Date: 10/25/19


Discharge Disposition: Home, Self-Care 01


Condition: Good





- Referral to Home Health


Primary Care Physician: 


PCP None








- Discharge Diagnosis/Problem(s)


(1) Gastroenteritis


SNOMED Code(s): 91923092


   ICD Code: K52.9 - NONINFECTIVE GASTROENTERITIS AND COLITIS, UNSPECIFIED   

Status: Acute   





- Patient Summary/Data


Hospital Course: 





Al presented to the emergency room with rhinitis, nausea, abdominal pain and 

diarrhea. Workup in the emergency room revealed a reassuring laboratory 

studies. CT scan of the abdomen and pelvis showed nonspecific fluid retained 

throughout the small intestine concerning for either ileus or gastroenteritis. 

There was evidence for dehydration. He was admitted to observation for 

hydration and symptom management. Overnight following admission his diarrhea 

improved significantly and then resolved the day after admission. His nausea 

resolved. He was able to slowly advance his appetite. He did have difficulty 

with urinary retention overnight after admission and a Pastor catheter was 

placed. This was removed the next day and there have been no urinary issues 

since that time. He has been able to return his appetite to baseline. Strength 

has improved with increased activity. He did have a low-grade fever the night 

prior to discharge but otherwise vitals are stable and he's feeling much 

better. I suspect he had a viral gastroenteritis and did have sick contacts 

prior to coming into the hospital. He is stable and safe for discharge at this 

time.





- Patient Instructions


Diet: Regular Diet as Tolerated


Activity: As Tolerated


Driving: May Drive Today


Showering/Bathing: May Shower


Notify Provider of: Fever, Increased Pain, Nausea and/or Vomiting





- Discharge Plan


*PRESCRIPTION DRUG MONITORING PROGRAM REVIEWED*: Not Applicable


*COPY OF PRESCRIPTION DRUG MONITORING REPORT IN PATIENT [facility]: Not Applicable


Home Medications: 


 Home Meds





Aspirin 325 mg PO DAILY 05/31/14 [History]


Diltiazem HCl [Diltiazem 12Hr ER] 90 mg PO BID 05/31/14 [History]


Lisinopril/Hydrochlorothiazide [Lisinopril-Hctz 20-12.5 mg Tab] 1 each PO BID 05 /31/14 [History]


Metoprolol Succinate [Toprol XL 50mg] 1 tab PO DAILY 05/31/14 [History]


Nitroglycerin 0.4 mg SL ASDIRECTED PRN 05/31/14 [History]


Simvastatin 1 tab PO BEDTIME 05/31/14 [History]


Omeprazole 40 mg PO DAILY 04/12/16 [History]


Hydrocodone/Acetaminophen [Hydrocodon-Acetaminophn ] 2 tab PO Q8H PRN 10/

23/19 [History]


LORazepam 0.5 mg PO Q8H PRN 10/23/19 [History]








Oxygen Therapy Mode: Room Air


Patient Handouts:  Viral Gastroenteritis, Adult


Referrals: 


Shane Hinton MD [Physician] - 11/06/19 1:30 pm (Please arrive 15 minutes 

early to register for appointment.)





- Discharge Summary/Plan Comment


DC Time >30 min.: No





- Patient Data


Vitals - Most Recent: 


 Last Vital Signs











Temp  36.6 C   10/25/19 07:25


 


Pulse  84   10/25/19 08:44


 


Resp  18   10/25/19 07:25


 


BP  114/42 L  10/25/19 08:44


 


Pulse Ox  99   10/25/19 07:25











Weight - Most Recent: 57.878 kg


I&O - Last 24 hours: 


 Intake & Output











 10/24/19 10/25/19 10/25/19





 22:59 06:59 14:59


 


Intake Total 480 1000 1000


 


Output Total 500 200 


 


Balance -20 800 1000











Lab Results - Last 24 hrs: 


 Laboratory Results - last 24 hr











  10/25/19 Range/Units





  05:00 


 


Sodium  136 L  (140-148)  mmol/L


 


Potassium  3.8  (3.6-5.2)  mmol/L


 


Chloride  101  (100-108)  mmol/L


 


Carbon Dioxide  27  (21-32)  mmol/L


 


Anion Gap  11.8  (5.0-14.0)  mmol/L


 


BUN  19 H  (7-18)  mg/dL


 


Creatinine  1.2  (0.8-1.3)  mg/dL


 


Est Cr Clr Drug Dosing  36.84  mL/min


 


Estimated GFR (MDRD)  58 L  (>60)  


 


Glucose  133 H  ()  mg/dL


 


Calcium  8.5  (8.5-10.1)  mg/dL











Med Orders - Current: 


 Current Medications





Hydrocodone Bitart/Acetaminophen (Norco 325-10 Mg)  2 tab PO Q8H PRN


   PRN Reason: Pain


   Last Admin: 10/25/19 03:46 Dose:  2 tab


Aspirin (Ecotrin)  325 mg PO DAILY [facility]


   Last Admin: 10/25/19 08:43 Dose:  325 mg


Lorazepam (Ativan)  0.5 mg IVPUSH Q4H PRN


   PRN Reason: Nausea/Vomiting


   Last Admin: 10/23/19 18:38 Dose:  0.5 mg


Lorazepam (Ativan)  0.5 mg PO Q8H PRN


   PRN Reason: Anxiety


   Last Admin: 10/25/19 00:40 Dose:  0.5 mg


Melatonin (Melatonin)  9 mg PO BEDTIME PRN


   PRN Reason: Sleep


Metoprolol Succinate (Toprol Xl)  50 mg PO DAILY [facility]


   Last Admin: 10/25/19 08:44 Dose:  50 mg


Diltiazem Hcl [ Diltiazem 12hr Er] 90 Mg*Pom*  90 mg PO BID [facility]


   Last Admin: 10/25/19 08:42 Dose:  90 mg


Lisinopril/Hydrochlorothiazide( Lisinopril-Hctz) 20- 12.5 *Pom*  1 each PO BID 

[facility]


   Last Admin: 10/25/19 08:43 Dose:  1 each


Ondansetron HCl (Zofran Odt)  4 mg PO Q6H PRN


   PRN Reason: Nausea able to take PO


   Last Admin: 10/24/19 21:29 Dose:  4 mg


Ondansetron HCl (Zofran)  4 mg IV Q6H PRN


   PRN Reason: Nausea/Vomiting


Pantoprazole Sodium (Protonix***)  40 mg PO DAILY [facility]


   Last Admin: 10/25/19 08:44 Dose:  40 mg





Discontinued Medications





Fentanyl (Sublimaze)  50 mcg IVPUSH ONETIME ONE


   Stop: 10/23/19 14:04


   Last Admin: 10/23/19 14:07 Dose:  50 mcg


Sodium Chloride (Normal Saline)  1,000 mls @ 125 mls/hr IV ASDIRECTED [facility]


   Last Admin: 10/24/19 07:18 Dose:  125 mls/hr


Influenza Virus Vaccine (Fluzone High-Dose 2019-20 Syringe)  180 mcg IM .ONCE 

ONE


   Stop: 10/24/19 21:01


   Last Admin: 10/24/19 22:08 Dose:  Not Given


Influenza Virus Vaccine (Fluzone High-Dose 2019-20 Syringe)  180 mcg IM .ONCE 

ONE


   Stop: 10/25/19 09:01


Non-Formulary Medication (Simvastatin [Simvastatin])  1 tab PO BEDTIME [facility]


Pantoprazole Sodium (Protonix Iv***)  40 mg IV ONETIME ONE


   Stop: 10/23/19 16:31


   Last Admin: 10/23/19 17:38 Dose:  40 mg


Potassium Chloride (Klor-Con M20)  40 meq PO ONETIME ONE


   Stop: 10/24/19 09:31


   Last Admin: 10/24/19 09:16 Dose:  40 meq


Tamsulosin HCl (Flomax)  0.4 mg PO ONETIME ONE


   Stop: 10/24/19 09:10


   Last Admin: 10/24/19 09:16 Dose:  0.4 mg











- Exam


Quality Assessment: Denies: Supplemental Oxygen


General: Reports: Alert, Oriented, Cooperative, No Acute Distress


Lungs: Reports: Normal Respiratory Effort


GI/Abdominal Exam: Soft, No Distention


Extremities: Pedal Edema


Psy/Mental Status: Reports: Alert, Normal Affect

## 2020-06-23 ENCOUNTER — HOSPITAL ENCOUNTER (INPATIENT)
Dept: HOSPITAL 11 - JP.ED | Age: 85
LOS: 3 days | Discharge: HOME | DRG: 389 | End: 2020-06-26
Attending: INTERNAL MEDICINE | Admitting: INTERNAL MEDICINE
Payer: MEDICARE

## 2020-06-23 DIAGNOSIS — I10: ICD-10-CM

## 2020-06-23 DIAGNOSIS — K56.609: Primary | ICD-10-CM

## 2020-06-23 DIAGNOSIS — M54.9: ICD-10-CM

## 2020-06-23 DIAGNOSIS — M19.90: ICD-10-CM

## 2020-06-23 DIAGNOSIS — K57.90: ICD-10-CM

## 2020-06-23 DIAGNOSIS — K21.9: ICD-10-CM

## 2020-06-23 DIAGNOSIS — Z98.890: ICD-10-CM

## 2020-06-23 DIAGNOSIS — Z87.891: ICD-10-CM

## 2020-06-23 DIAGNOSIS — N40.1: ICD-10-CM

## 2020-06-23 DIAGNOSIS — N42.9: ICD-10-CM

## 2020-06-23 DIAGNOSIS — Z85.828: ICD-10-CM

## 2020-06-23 DIAGNOSIS — Z95.2: ICD-10-CM

## 2020-06-23 DIAGNOSIS — Z95.1: ICD-10-CM

## 2020-06-23 DIAGNOSIS — I45.10: ICD-10-CM

## 2020-06-23 DIAGNOSIS — R33.8: ICD-10-CM

## 2020-06-23 DIAGNOSIS — N13.8: ICD-10-CM

## 2020-06-23 DIAGNOSIS — Z95.5: ICD-10-CM

## 2020-06-23 DIAGNOSIS — Z90.49: ICD-10-CM

## 2020-06-23 DIAGNOSIS — I25.10: ICD-10-CM

## 2020-06-23 DIAGNOSIS — Z79.899: ICD-10-CM

## 2020-06-23 DIAGNOSIS — K56.600: ICD-10-CM

## 2020-06-23 DIAGNOSIS — H54.7: ICD-10-CM

## 2020-06-23 DIAGNOSIS — Z79.82: ICD-10-CM

## 2020-06-23 DIAGNOSIS — G89.29: ICD-10-CM

## 2020-06-23 PROCEDURE — 99285 EMERGENCY DEPT VISIT HI MDM: CPT

## 2020-06-23 PROCEDURE — 0D9670Z DRAINAGE OF STOMACH WITH DRAINAGE DEVICE, VIA NATURAL OR ARTIFICIAL OPENING: ICD-10-PCS | Performed by: INTERNAL MEDICINE

## 2020-06-23 PROCEDURE — 85027 COMPLETE CBC AUTOMATED: CPT

## 2020-06-23 PROCEDURE — 36415 COLL VENOUS BLD VENIPUNCTURE: CPT

## 2020-06-23 PROCEDURE — 83690 ASSAY OF LIPASE: CPT

## 2020-06-23 PROCEDURE — 96375 TX/PRO/DX INJ NEW DRUG ADDON: CPT

## 2020-06-23 PROCEDURE — 83605 ASSAY OF LACTIC ACID: CPT

## 2020-06-23 PROCEDURE — 80053 COMPREHEN METABOLIC PANEL: CPT

## 2020-06-23 PROCEDURE — 93005 ELECTROCARDIOGRAM TRACING: CPT

## 2020-06-23 PROCEDURE — 74177 CT ABD & PELVIS W/CONTRAST: CPT

## 2020-06-23 PROCEDURE — 85610 PROTHROMBIN TIME: CPT

## 2020-06-23 PROCEDURE — 96376 TX/PRO/DX INJ SAME DRUG ADON: CPT

## 2020-06-23 PROCEDURE — 71046 X-RAY EXAM CHEST 2 VIEWS: CPT

## 2020-06-23 PROCEDURE — 96361 HYDRATE IV INFUSION ADD-ON: CPT

## 2020-06-23 PROCEDURE — 81001 URINALYSIS AUTO W/SCOPE: CPT

## 2020-06-23 PROCEDURE — 84484 ASSAY OF TROPONIN QUANT: CPT

## 2020-06-23 PROCEDURE — 96374 THER/PROPH/DIAG INJ IV PUSH: CPT

## 2020-06-23 RX ADMIN — Medication SCH MG: at 21:46

## 2020-06-23 NOTE — EDM.PDOC
ED HPI GENERAL MEDICAL PROBLEM





- General


Chief Complaint: Abdominal Pain


Stated Complaint: MEDICAL VIA NORTH


Time Seen by Provider: 06/23/20 12:41


Source of Information: Reports: Patient


History Limitations: Reports: Other (Patient perseverates on how severe his 

abdominal pain is and keeps telling me the same information over and over, not 

answering simple questions)





- History of Present Illness


Onset: Other (Patient cannot tell me a specific time when the pain started 

yesterday.  States that the pain did get better briefly after a bowel movement. 

On further questioning the patient has had this pain recurring since October 

last year)


Duration: Waxing/Waning


Location: Reports: Abdomen


Quality: Reports: Ache, Stabbing


Severity: Severe


Improves with: Reports: Other (Bowel movement)


Worsens with: Reports: Eating


Associated Symptoms: Reports: Diaphoresis, Nausea/Vomiting, Shortness of Breath 

(States he has had shortness of breath associated with anxiety.), Other (Has had

sweats and nausea).  Denies: Cough, Rash


Treatments PTA: Reports: Other (see below) (Has not taken any of his prescribed 

medications today including lorazepam for anxiety)


  ** Abdomen


Pain Score (Numeric/FACES): 5





- Related Data


                                    Allergies











Allergy/AdvReac Type Severity Reaction Status Date / Time


 


No Known Allergies Allergy   Verified 04/12/16 16:51











Home Meds: 


                                    Home Meds





Aspirin 325 mg PO DAILY 05/31/14 [History]


Lisinopril/Hydrochlorothiazide [Lisinopril-Hctz 20-12.5 mg Tab] 1 each PO BID 

05/31/14 [History]


Metoprolol Succinate [Toprol XL 50mg] 1 tab PO DAILY 05/31/14 [History]


Nitroglycerin 0.4 mg SL ASDIRECTED PRN 05/31/14 [History]


dilTIAZem HCL [Diltiazem 12Hr ER] 90 mg PO BID 05/31/14 [History]


Hydrocodone/Acetaminophen [Hydrocodon-Acetaminophn ] 2 tab PO Q8H PRN 

10/23/19 [History]


LORazepam 0.5 mg PO Q8H PRN 10/23/19 [History]











Past Medical History


HEENT History: Reports: Impaired Vision


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


Gastrointestinal History: Reports: Diverticulosis, GERD


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


Musculoskeletal History: Reports: Arthritis, Back Pain, Chronic


Other Oncologic History: skin


Other Dermatologic History: skin cancer





- Infectious Disease History


Infectious Disease History: Reports: Chicken Pox, Measles, Mumps





- Past Surgical History


Cardiovascular Surgical History: Reports: Valve Replacement, Other (See Below)


GI Surgical History: Reports: Appendectomy, Cholecystectomy, Colonoscopy, EGD, 

Hernia Repair/Other





Social & Family History





- Family History


Cardiac: Reports: CAD, MI


: Reports: Renal Disease/Insufficiency


Oncologic: Reports: Brain





- Tobacco Use


Smoking Status *Q: Former Smoker


Used Tobacco, but Quit: Yes


Month/Year Tobacco Last Used: 60 years ago





- Caffeine Use


Caffeine Use: Reports: Coffee


Caffeine Use Comment: rarely





- Recreational Drug Use


Recreational Drug Use: No





ED ROS GENERAL





- Review of Systems


Review Of Systems: See Below


Constitutional: Reports: Chills, Night Sweats


HEENT: Reports: No Symptoms


Respiratory: Reports: Shortness of Breath.  Denies: Wheezing, Cough


Cardiovascular: Reports: Chest Pain (Epigastric pain.  When I asked him to 

locate his pain he points to his left lower rib cage area)


Endocrine: Reports: No Symptoms


GI/Abdominal: Reports: Abdominal Pain, Distension (Dates abdomen is more swollen

 than usual), Nausea


Musculoskeletal: Reports: No Symptoms


Skin: Reports: No Symptoms





ED EXAM, GI/ABD





- Physical Exam


Exam: See Below


General Appearance: Alert


Head: Atraumatic, Normocephalic


Neck: Normal Inspection


Respiratory/Chest: No Respiratory Distress, Lungs Clear


Cardiovascular: Normal Peripheral Pulses


GI/Abdominal Exam: Distended, Tender, Abnormal Bowel Sounds (High-pitched 

frequent bowel sounds).  No: Guarding, Rebound





EKG INTERPRETATION


EKG Date: 06/23/20


Time: 13:15


Rhythm: Other (Complete heart block.  No P waves seen.)


Rate (Beats/Min): 59


P-Wave: Absent


QRS: RBBB (Widened QRS with RSR prime pattern seen in leads V1, V2, and V3, 

consistent with a right bundle branch block.)





Course





- Vital Signs


Text/Narrative:: 





Initial differential diagnosis includes: Gastroenteritis, small bowel 

obstruction, inferior cardiac ischemia, peptic ulcer disease, bowel perforation.

  X-ray shows clear fields and normal heart size, but does show marked bowel 

dilation.  CT scan is suspicious for small bowel obstruction.  At 1535 I did 

consult on-call surgeon who recommended NG, IVF, hospitalization and repeat XR 

in AM


Last Recorded V/S: 


                                Last Vital Signs











Temp  36.7 C   06/23/20 12:48


 


Pulse  65   06/23/20 15:35


 


Resp  14   06/23/20 12:48


 


BP  123/51 L  06/23/20 15:35


 


Pulse Ox  99   06/23/20 15:35














- Orders/Labs/Meds


Orders: 


                               Active Orders 24 hr











 Category Date Time Status


 


 EKG Documentation Completion [RC] ASDIRECTED Care  06/23/20 13:00 Active


 


 Overnight Pulse Oximetry [RC] Click to Edit Care  06/23/20 13:28 Active


 


 Sodium Chloride 0.9% [Normal Saline] 1,000 ml Med  06/23/20 13:15 Active





 IV ASDIRECTED   


 


 Sodium Chloride 0.9% [Normal Saline] 76 ml Med  06/23/20 13:15 Active





 IV ASDIRECTED   


 


 Nasogastric Orogastric Tube Insertion [OM.PC] Routine Oth  06/23/20 15:43 

Ordered


 


 Pulse Oximetry Continuous Monitoring [OM.PC] Routine Oth  06/23/20 13:27 

Ordered


 


 EKG 12 Lead [EK] Urgent Ther  06/23/20 12:58 Ordered








                                Medication Orders





Sodium Chloride (Normal Saline)  1,000 mls @ 500 mls/hr IV ASDIRECTED [facility]


   Last Admin: 06/23/20 13:24  Dose: 500 mls/hr


   Documented by: PREILOR


Sodium Chloride (Normal Saline)  76 mls @ 3.3 mls/sec IV ASDIRECTED [facility] hospital


   Last Admin: 06/23/20 13:44  Dose: 3.3 mls/sec


   Documented by: DECRMIC








Labs: 


                                Laboratory Tests











  06/23/20 06/23/20 06/23/20 Range/Units





  13:12 13:12 13:12 


 


WBC   6.4   (4.5-11.0)  K/uL


 


RBC   3.85 L   (4.30-5.90)  M/uL


 


Hgb   11.0 L   (12.0-15.0)  g/dL


 


Hct   34.7 L   (40.0-54.0)  %


 


MCV   90   (80-98)  fL


 


MCH   29   (27-31)  pg


 


MCHC   32   (32-36)  %


 


Plt Count   249   (150-400)  K/uL


 


PT    11.2  (9.5-12.0)  sec


 


INR    1.04  (0.80-1.20)  


 


Sodium     (140-148)  mmol/L


 


Potassium     (3.6-5.2)  mmol/L


 


Chloride     (100-108)  mmol/L


 


Carbon Dioxide     (21-32)  mmol/L


 


Anion Gap     (5.0-14.0)  mmol/L


 


BUN     (7-18)  mg/dL


 


Creatinine     (0.8-1.3)  mg/dL


 


Est Cr Clr Drug Dosing     mL/min


 


Estimated GFR (MDRD)     (>60)  


 


BUN/Creatinine Ratio     


 


Glucose     ()  mg/dL


 


Lactic Acid     (0.4-2.0)  mmol/L


 


Calcium     (8.5-10.1)  mg/dL


 


Total Bilirubin     (0.2-1.0)  mg/dL


 


AST     (15-37)  U/L


 


ALT     (12-78)  U/L


 


Alkaline Phosphatase     ()  U/L


 


Troponin I  < 0.017    (0.000-0.056)  ng/mL


 


Total Protein     (6.4-8.2)  g/dL


 


Albumin     (3.4-5.0)  g/dL


 


Globulin     (2.3-3.5)  g/dL


 


Albumin/Globulin Ratio     (1.2-2.2)  


 


Lipase     ()  U/L


 


Urine Color     (YELLOW)  


 


Urine Appearance     (CLEAR)  


 


Urine pH     (5.0-8.0)  


 


Ur Specific Gravity     (1.008-1.030)  


 


Urine Protein     (NEGATIVE)  mg/dL


 


Urine Glucose (UA)     (NEGATIVE)  mg/dL


 


Urine Ketones     (NEGATIVE)  mg/dL


 


Urine Occult Blood     (NEGATIVE)  


 


Urine Nitrite     (NEGATIVE)  


 


Urine Bilirubin     (NEGATIVE)  


 


Urine Urobilinogen     (0.2-1.0)  EU/dL


 


Ur Leukocyte Esterase     (NEGATIVE)  


 


Urine RBC     (0-5)  


 


Urine WBC     (0-5)  


 


Ur Epithelial Cells     


 


Amorphous Sediment     


 


Urine Bacteria     


 


Urine Mucus     














  06/23/20 06/23/20 06/23/20 Range/Units





  13:12 13:12 13:12 


 


WBC     (4.5-11.0)  K/uL


 


RBC     (4.30-5.90)  M/uL


 


Hgb     (12.0-15.0)  g/dL


 


Hct     (40.0-54.0)  %


 


MCV     (80-98)  fL


 


MCH     (27-31)  pg


 


MCHC     (32-36)  %


 


Plt Count     (150-400)  K/uL


 


PT     (9.5-12.0)  sec


 


INR     (0.80-1.20)  


 


Sodium  134 L    (140-148)  mmol/L


 


Potassium  4.1    (3.6-5.2)  mmol/L


 


Chloride  96 L    (100-108)  mmol/L


 


Carbon Dioxide  32    (21-32)  mmol/L


 


Anion Gap  10.1    (5.0-14.0)  mmol/L


 


BUN  16    (7-18)  mg/dL


 


Creatinine  1.3    (0.8-1.3)  mg/dL


 


Est Cr Clr Drug Dosing  40.19    mL/min


 


Estimated GFR (MDRD)  52 L    (>60)  


 


BUN/Creatinine Ratio  Not Reportable    


 


Glucose  143 H    ()  mg/dL


 


Lactic Acid   1.6   (0.4-2.0)  mmol/L


 


Calcium  9.2    (8.5-10.1)  mg/dL


 


Total Bilirubin  0.7  D    (0.2-1.0)  mg/dL


 


AST  19    (15-37)  U/L


 


ALT  22    (12-78)  U/L


 


Alkaline Phosphatase  76    ()  U/L


 


Troponin I     (0.000-0.056)  ng/mL


 


Total Protein  7.6    (6.4-8.2)  g/dL


 


Albumin  3.8    (3.4-5.0)  g/dL


 


Globulin  3.8 H    (2.3-3.5)  g/dL


 


Albumin/Globulin Ratio  1.0 L    (1.2-2.2)  


 


Lipase    84  ()  U/L


 


Urine Color     (YELLOW)  


 


Urine Appearance     (CLEAR)  


 


Urine pH     (5.0-8.0)  


 


Ur Specific Gravity     (1.008-1.030)  


 


Urine Protein     (NEGATIVE)  mg/dL


 


Urine Glucose (UA)     (NEGATIVE)  mg/dL


 


Urine Ketones     (NEGATIVE)  mg/dL


 


Urine Occult Blood     (NEGATIVE)  


 


Urine Nitrite     (NEGATIVE)  


 


Urine Bilirubin     (NEGATIVE)  


 


Urine Urobilinogen     (0.2-1.0)  EU/dL


 


Ur Leukocyte Esterase     (NEGATIVE)  


 


Urine RBC     (0-5)  


 


Urine WBC     (0-5)  


 


Ur Epithelial Cells     


 


Amorphous Sediment     


 


Urine Bacteria     


 


Urine Mucus     














  06/23/20 Range/Units





  14:57 


 


WBC   (4.5-11.0)  K/uL


 


RBC   (4.30-5.90)  M/uL


 


Hgb   (12.0-15.0)  g/dL


 


Hct   (40.0-54.0)  %


 


MCV   (80-98)  fL


 


MCH   (27-31)  pg


 


MCHC   (32-36)  %


 


Plt Count   (150-400)  K/uL


 


PT   (9.5-12.0)  sec


 


INR   (0.80-1.20)  


 


Sodium   (140-148)  mmol/L


 


Potassium   (3.6-5.2)  mmol/L


 


Chloride   (100-108)  mmol/L


 


Carbon Dioxide   (21-32)  mmol/L


 


Anion Gap   (5.0-14.0)  mmol/L


 


BUN   (7-18)  mg/dL


 


Creatinine   (0.8-1.3)  mg/dL


 


Est Cr Clr Drug Dosing   mL/min


 


Estimated GFR (MDRD)   (>60)  


 


BUN/Creatinine Ratio   


 


Glucose   ()  mg/dL


 


Lactic Acid   (0.4-2.0)  mmol/L


 


Calcium   (8.5-10.1)  mg/dL


 


Total Bilirubin   (0.2-1.0)  mg/dL


 


AST   (15-37)  U/L


 


ALT   (12-78)  U/L


 


Alkaline Phosphatase   ()  U/L


 


Troponin I   (0.000-0.056)  ng/mL


 


Total Protein   (6.4-8.2)  g/dL


 


Albumin   (3.4-5.0)  g/dL


 


Globulin   (2.3-3.5)  g/dL


 


Albumin/Globulin Ratio   (1.2-2.2)  


 


Lipase   ()  U/L


 


Urine Color  Yellow  (YELLOW)  


 


Urine Appearance  Clear  (CLEAR)  


 


Urine pH  7.5  (5.0-8.0)  


 


Ur Specific Gravity  1.015  (1.008-1.030)  


 


Urine Protein  Negative  (NEGATIVE)  mg/dL


 


Urine Glucose (UA)  Negative  (NEGATIVE)  mg/dL


 


Urine Ketones  Negative  (NEGATIVE)  mg/dL


 


Urine Occult Blood  Trace-lysed H  (NEGATIVE)  


 


Urine Nitrite  Negative  (NEGATIVE)  


 


Urine Bilirubin  Negative  (NEGATIVE)  


 


Urine Urobilinogen  0.2  (0.2-1.0)  EU/dL


 


Ur Leukocyte Esterase  Negative  (NEGATIVE)  


 


Urine RBC  0-5  (0-5)  


 


Urine WBC  0-5  (0-5)  


 


Ur Epithelial Cells  Rare  


 


Amorphous Sediment  Not seen  


 


Urine Bacteria  Rare  


 


Urine Mucus  Not seen  











Meds: 


Medications











Generic Name Dose Route Start Last Admin





  Trade Name Freq  PRN Reason Stop Dose Admin


 


Sodium Chloride  1,000 mls @ 500 mls/hr  06/23/20 13:15  06/23/20 13:24





  Normal Saline  IV   500 mls/hr





  ASDIRECTED REGINO   Administration


 


Sodium Chloride  76 mls @ 3.3 mls/sec  06/23/20 13:15  06/23/20 13:44





  Normal Saline  IV   3.3 mls/sec





  ASDIRECTED REGINO   Administration














Discontinued Medications














Generic Name Dose Route Start Last Admin





  Trade Name Bri  PRN Reason Stop Dose Admin


 


Hydromorphone HCl  0.5 mg  06/23/20 13:02  06/23/20 13:28





  Dilaudid  IVPUSH  06/23/20 13:03  0.5 mg





  ONETIME ONE   Administration


 


Hydromorphone HCl  0.5 mg  06/23/20 15:43 





  Dilaudid  IVPUSH  06/23/20 15:44 





  ONETIME ONE  


 


Iopamidol  100 ml  06/23/20 13:13  06/23/20 13:44





  Isovue-300 (61%)  IV  06/23/20 13:14  100 ml





  .AS DIRECTED ONE   Administration


 


Ondansetron HCl  4 mg  06/23/20 13:02  06/23/20 13:26





  Zofran  IVPUSH  06/23/20 13:03  4 mg





  ONETIME ONE   Administration














Departure





- Departure


Time of Disposition: 15:45


Disposition: Admitted As Inpatient 66


Clinical Impression: 


 Heart block, Small bowel obstruction








- Discharge Information


Referrals: 


PCP,None [Primary Care Provider] - 


Forms:  ED Department Discharge





Sepsis Event Note (ED)





- Evaluation


Sepsis Screening Result: No Definite Risk





- Focused Exam


Vital Signs: 


                                   Vital Signs











  Temp Pulse Resp BP Pulse Ox


 


 06/23/20 15:35   65   123/51 L  99


 


 06/23/20 15:12   60   143/65 H 


 


 06/23/20 12:48  36.7 C  59 L  14  125/47 L  99


 


 06/23/20 12:36  36.7 C  59 L  14  125/47 L  99














- My Orders


Last 24 Hours: 


My Active Orders





06/23/20 12:58


EKG 12 Lead [EK] Urgent 





06/23/20 13:00


EKG Documentation Completion [RC] ASDIRECTED 





06/23/20 13:15


Sodium Chloride 0.9% [Normal Saline] 1,000 ml IV ASDIRECTED 


Sodium Chloride 0.9% [Normal Saline] 76 ml IV ASDIRECTED 





06/23/20 13:27


Pulse Oximetry Continuous Monitoring [OM.PC] Routine 





06/23/20 13:28


Overnight Pulse Oximetry [RC] Click to Edit 





06/23/20 15:43


Nasogastric Orogastric Tube Insertion [OM.PC] Routine 














- Assessment/Plan


Last 24 Hours: 


My Active Orders





06/23/20 12:58


EKG 12 Lead [EK] Urgent 





06/23/20 13:00


EKG Documentation Completion [RC] ASDIRECTED 





06/23/20 13:15


Sodium Chloride 0.9% [Normal Saline] 1,000 ml IV ASDIRECTED 


Sodium Chloride 0.9% [Normal Saline] 76 ml IV ASDIRECTED 





06/23/20 13:27


Pulse Oximetry Continuous Monitoring [OM.PC] Routine 





06/23/20 13:28


Overnight Pulse Oximetry [RC] Click to Edit 





06/23/20 15:43


Nasogastric Orogastric Tube Insertion [OM.PC] Routine

## 2020-06-23 NOTE — PCM.HP.2
H&P History of Present Illness





- General


Date of Service: 06/23/20


Admit Problem/Dx: 


                           Admission Diagnosis/Problem





Admission Diagnosis/Problem      Abdominal pain








Source of Information: Patient, Provider, RN Notes Reviewed


History Limitations: Reports: No Limitations





- History of Present Illness


Initial Comments - Free Text/Narative: 





Mr. Powell is an 85-year-old gentleman who was admitted through the emergency 

department with abdominal pain and nausea secondary to small bowel obstruction. 

He did not feel well last night had a large bowel movement and then diarrhea 

afterwards.  After eating this morning he developed acute pain in his upper 

abdomen described as an intense ache which did not radiate.  He noted no other 

precipitating or relieving factors.  Because of the severe pain he was brought 

into the emergency department by EMS.  CT scan shows evidence of small bowel 

obstruction.  NG tube is been placed in the emergency department.


  ** Abdomen


Pain Score (Numeric/FACES): 5





- Related Data


Allergies/Adverse Reactions: 


                                    Allergies











Allergy/AdvReac Type Severity Reaction Status Date / Time


 


No Known Allergies Allergy   Verified 04/12/16 16:51











Home Medications: 


                                    Home Meds





Aspirin 325 mg PO DAILY 05/31/14 [History]


Lisinopril/Hydrochlorothiazide [Lisinopril-Hctz 20-12.5 mg Tab] 1 each PO BID 

05/31/14 [History]


Metoprolol Succinate [Toprol XL 50mg] 1 tab PO DAILY 05/31/14 [History]


Nitroglycerin 0.4 mg SL ASDIRECTED PRN 05/31/14 [History]


dilTIAZem HCL [Diltiazem 12Hr ER] 90 mg PO BID 05/31/14 [History]


Hydrocodone/Acetaminophen [Hydrocodon-Acetaminophn ] 2 tab PO Q8H PRN 

10/23/19 [History]


LORazepam 0.5 mg PO Q8H PRN 10/23/19 [History]











Past Medical History


HEENT History: Reports: Impaired Vision


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


Gastrointestinal History: Reports: Diverticulosis, GERD


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


Musculoskeletal History: Reports: Arthritis, Back Pain, Chronic


Other Oncologic History: skin


Other Dermatologic History: skin cancer





- Infectious Disease History


Infectious Disease History: Reports: Chicken Pox, Measles, Mumps





- Past Surgical History


Cardiovascular Surgical History: Reports: Valve Replacement, Other (See Below)


GI Surgical History: Reports: Appendectomy, Cholecystectomy, Colonoscopy, EGD, 

Hernia Repair/Other





Social & Family History





- Family History


Cardiac: Reports: CAD, MI


: Reports: Renal Disease/Insufficiency


Oncologic: Reports: Brain





- Tobacco Use


Smoking Status *Q: Former Smoker


Used Tobacco, but Quit: Yes


Month/Year Tobacco Last Used: 60 years ago





- Caffeine Use


Caffeine Use: Reports: Coffee


Caffeine Use Comment: rarely





- Recreational Drug Use


Recreational Drug Use: No





H&P Review of Systems





- Review of Systems:


Review Of Systems: See Below


General: Reports: No Symptoms


HEENT: Reports: No Symptoms


Pulmonary: Reports: No Symptoms


Cardiovascular: Reports: No Symptoms


Gastrointestinal: Reports: Abdominal Pain, Distension, Nausea.  Denies: 

Difficulty Swallowing, Hematemesis, Hematochezia, Melena


Genitourinary: Reports: No Symptoms


Musculoskeletal: Reports: No Symptoms


Skin: Reports: No Symptoms


Psychiatric: Reports: No Symptoms


Neurological: Reports: No Symptoms


Hematologic/Lymphatic: Reports: No Symptoms


Immunologic: Reports: No Symptoms





Exam





- Exam


Exam: See Below





- Vital Signs


Vital Signs: 


                                Last Vital Signs











Temp  98.1 F   06/23/20 12:48


 


Pulse  65   06/23/20 15:35


 


Resp  14   06/23/20 12:48


 


BP  123/51 L  06/23/20 15:35


 


Pulse Ox  99   06/23/20 15:35











Weight: 155 lb





- Exam


Quality Assessment: DVT Prophylaxis


General: Alert, Oriented, Cooperative, Moderate Distress


HEENT: Conjunctiva Clear, Hearing Intact, Normal Nasal Septum, Posterior Pharynx

 Clear, Pupils Equal.  No: Mucosa Moist & Pink


Neck: Supple, Trachea Midline, +2 Carotid Pulse wo Bruit


Lungs: Clear to Auscultation, Normal Respiratory Effort


Cardiovascular: Regular Rate, Regular Rhythm, Normal S1, Normal S2, Systolic 

Murmur


GI/Abdominal Exam: No Organomegaly, Distended, Tender.  No: Guarding, Rigid, 

Rebound


Back Exam: Normal Inspection, Full Range of Motion


Extremities: Non-Tender, No Pedal Edema


Skin: Warm, Dry, Intact


Neurological: Cranial Nerves Intact, Strength Equal Bilateral, Normal Speech, 

Normal Tone, Sensation Intact.  No: Focal Deficit


Neuro Extensive - Mental Status: Alert, Oriented x3, Normal Mood/Affect, Normal 

Cognition, Memory Intact





- Patient Data


Lab Results Last 24 hrs: 


                         Laboratory Results - last 24 hr











  06/23/20 06/23/20 06/23/20 Range/Units





  13:12 13:12 13:12 


 


WBC   6.4   (4.5-11.0)  K/uL


 


RBC   3.85 L   (4.30-5.90)  M/uL


 


Hgb   11.0 L   (12.0-15.0)  g/dL


 


Hct   34.7 L   (40.0-54.0)  %


 


MCV   90   (80-98)  fL


 


MCH   29   (27-31)  pg


 


MCHC   32   (32-36)  %


 


Plt Count   249   (150-400)  K/uL


 


PT    11.2  (9.5-12.0)  sec


 


INR    1.04  (0.80-1.20)  


 


Sodium     (140-148)  mmol/L


 


Potassium     (3.6-5.2)  mmol/L


 


Chloride     (100-108)  mmol/L


 


Carbon Dioxide     (21-32)  mmol/L


 


Anion Gap     (5.0-14.0)  mmol/L


 


BUN     (7-18)  mg/dL


 


Creatinine     (0.8-1.3)  mg/dL


 


Est Cr Clr Drug Dosing     mL/min


 


Estimated GFR (MDRD)     (>60)  


 


BUN/Creatinine Ratio     


 


Glucose     ()  mg/dL


 


Lactic Acid     (0.4-2.0)  mmol/L


 


Calcium     (8.5-10.1)  mg/dL


 


Total Bilirubin     (0.2-1.0)  mg/dL


 


AST     (15-37)  U/L


 


ALT     (12-78)  U/L


 


Alkaline Phosphatase     ()  U/L


 


Troponin I  < 0.017    (0.000-0.056)  ng/mL


 


Total Protein     (6.4-8.2)  g/dL


 


Albumin     (3.4-5.0)  g/dL


 


Globulin     (2.3-3.5)  g/dL


 


Albumin/Globulin Ratio     (1.2-2.2)  


 


Lipase     ()  U/L


 


Urine Color     (YELLOW)  


 


Urine Appearance     (CLEAR)  


 


Urine pH     (5.0-8.0)  


 


Ur Specific Gravity     (1.008-1.030)  


 


Urine Protein     (NEGATIVE)  mg/dL


 


Urine Glucose (UA)     (NEGATIVE)  mg/dL


 


Urine Ketones     (NEGATIVE)  mg/dL


 


Urine Occult Blood     (NEGATIVE)  


 


Urine Nitrite     (NEGATIVE)  


 


Urine Bilirubin     (NEGATIVE)  


 


Urine Urobilinogen     (0.2-1.0)  EU/dL


 


Ur Leukocyte Esterase     (NEGATIVE)  


 


Urine RBC     (0-5)  


 


Urine WBC     (0-5)  


 


Ur Epithelial Cells     


 


Amorphous Sediment     


 


Urine Bacteria     


 


Urine Mucus     














  06/23/20 06/23/20 06/23/20 Range/Units





  13:12 13:12 13:12 


 


WBC     (4.5-11.0)  K/uL


 


RBC     (4.30-5.90)  M/uL


 


Hgb     (12.0-15.0)  g/dL


 


Hct     (40.0-54.0)  %


 


MCV     (80-98)  fL


 


MCH     (27-31)  pg


 


MCHC     (32-36)  %


 


Plt Count     (150-400)  K/uL


 


PT     (9.5-12.0)  sec


 


INR     (0.80-1.20)  


 


Sodium  134 L    (140-148)  mmol/L


 


Potassium  4.1    (3.6-5.2)  mmol/L


 


Chloride  96 L    (100-108)  mmol/L


 


Carbon Dioxide  32    (21-32)  mmol/L


 


Anion Gap  10.1    (5.0-14.0)  mmol/L


 


BUN  16    (7-18)  mg/dL


 


Creatinine  1.3    (0.8-1.3)  mg/dL


 


Est Cr Clr Drug Dosing  40.19    mL/min


 


Estimated GFR (MDRD)  52 L    (>60)  


 


BUN/Creatinine Ratio  Not Reportable    


 


Glucose  143 H    ()  mg/dL


 


Lactic Acid   1.6   (0.4-2.0)  mmol/L


 


Calcium  9.2    (8.5-10.1)  mg/dL


 


Total Bilirubin  0.7  D    (0.2-1.0)  mg/dL


 


AST  19    (15-37)  U/L


 


ALT  22    (12-78)  U/L


 


Alkaline Phosphatase  76    ()  U/L


 


Troponin I     (0.000-0.056)  ng/mL


 


Total Protein  7.6    (6.4-8.2)  g/dL


 


Albumin  3.8    (3.4-5.0)  g/dL


 


Globulin  3.8 H    (2.3-3.5)  g/dL


 


Albumin/Globulin Ratio  1.0 L    (1.2-2.2)  


 


Lipase    84  ()  U/L


 


Urine Color     (YELLOW)  


 


Urine Appearance     (CLEAR)  


 


Urine pH     (5.0-8.0)  


 


Ur Specific Gravity     (1.008-1.030)  


 


Urine Protein     (NEGATIVE)  mg/dL


 


Urine Glucose (UA)     (NEGATIVE)  mg/dL


 


Urine Ketones     (NEGATIVE)  mg/dL


 


Urine Occult Blood     (NEGATIVE)  


 


Urine Nitrite     (NEGATIVE)  


 


Urine Bilirubin     (NEGATIVE)  


 


Urine Urobilinogen     (0.2-1.0)  EU/dL


 


Ur Leukocyte Esterase     (NEGATIVE)  


 


Urine RBC     (0-5)  


 


Urine WBC     (0-5)  


 


Ur Epithelial Cells     


 


Amorphous Sediment     


 


Urine Bacteria     


 


Urine Mucus     














  06/23/20 Range/Units





  14:57 


 


WBC   (4.5-11.0)  K/uL


 


RBC   (4.30-5.90)  M/uL


 


Hgb   (12.0-15.0)  g/dL


 


Hct   (40.0-54.0)  %


 


MCV   (80-98)  fL


 


MCH   (27-31)  pg


 


MCHC   (32-36)  %


 


Plt Count   (150-400)  K/uL


 


PT   (9.5-12.0)  sec


 


INR   (0.80-1.20)  


 


Sodium   (140-148)  mmol/L


 


Potassium   (3.6-5.2)  mmol/L


 


Chloride   (100-108)  mmol/L


 


Carbon Dioxide   (21-32)  mmol/L


 


Anion Gap   (5.0-14.0)  mmol/L


 


BUN   (7-18)  mg/dL


 


Creatinine   (0.8-1.3)  mg/dL


 


Est Cr Clr Drug Dosing   mL/min


 


Estimated GFR (MDRD)   (>60)  


 


BUN/Creatinine Ratio   


 


Glucose   ()  mg/dL


 


Lactic Acid   (0.4-2.0)  mmol/L


 


Calcium   (8.5-10.1)  mg/dL


 


Total Bilirubin   (0.2-1.0)  mg/dL


 


AST   (15-37)  U/L


 


ALT   (12-78)  U/L


 


Alkaline Phosphatase   ()  U/L


 


Troponin I   (0.000-0.056)  ng/mL


 


Total Protein   (6.4-8.2)  g/dL


 


Albumin   (3.4-5.0)  g/dL


 


Globulin   (2.3-3.5)  g/dL


 


Albumin/Globulin Ratio   (1.2-2.2)  


 


Lipase   ()  U/L


 


Urine Color  Yellow  (YELLOW)  


 


Urine Appearance  Clear  (CLEAR)  


 


Urine pH  7.5  (5.0-8.0)  


 


Ur Specific Gravity  1.015  (1.008-1.030)  


 


Urine Protein  Negative  (NEGATIVE)  mg/dL


 


Urine Glucose (UA)  Negative  (NEGATIVE)  mg/dL


 


Urine Ketones  Negative  (NEGATIVE)  mg/dL


 


Urine Occult Blood  Trace-lysed H  (NEGATIVE)  


 


Urine Nitrite  Negative  (NEGATIVE)  


 


Urine Bilirubin  Negative  (NEGATIVE)  


 


Urine Urobilinogen  0.2  (0.2-1.0)  EU/dL


 


Ur Leukocyte Esterase  Negative  (NEGATIVE)  


 


Urine RBC  0-5  (0-5)  


 


Urine WBC  0-5  (0-5)  


 


Ur Epithelial Cells  Rare  


 


Amorphous Sediment  Not seen  


 


Urine Bacteria  Rare  


 


Urine Mucus  Not seen  











Result Diagrams: 


                                 06/23/20 13:12





                                 06/23/20 13:12





Sepsis Event Note





- Evaluation


Sepsis Screening Result: No Definite Risk





- Focused Exam


Vital Signs: 


                                   Vital Signs











  Temp Pulse Resp BP Pulse Ox


 


 06/23/20 15:35   65   123/51 L  99


 


 06/23/20 15:12   60   143/65 H 


 


 06/23/20 12:48  98.1 F  59 L  14  125/47 L  99


 


 06/23/20 12:36  98.1 F  59 L  14  125/47 L  99











Date Exam was Performed: 06/23/20


Time Exam was Performed: 17:43





*Q Meaningful Use (ADM)





- VTE Risk Assess *Q


Each Risk Factor Represents 1 Point: None


Total Score 1 Point Risk Factors: 0


Each Risk Factor Represents 2 Points: None


Total Score 2 Point Risk Factors: 0


Each Risk Factor Represents 3 Points: Age 75 Years or Greater


Total Score 3 Point Risk Factors: 3


Each Risk Factor Represents 5 Points: None


Total Score 5 Point Risk Factors: 0


Venous Thromboembolism Risk Factor Score *Q: 3


Problem List Initiated/Reviewed/Updated: Yes


Orders Last 24hrs: 


                               Active Orders 24 hr











 Category Date Time Status


 


 Patient Status Manage Transfer [TRANSFER] Routine ADT  06/23/20 16:49 Ordered


 


 EKG Documentation Completion [RC] ASDIRECTED Care  06/23/20 13:00 Active


 


 Overnight Pulse Oximetry [RC] Click to Edit Care  06/23/20 13:28 Active


 


 Sodium Chloride 0.9% [Normal Saline] 1,000 ml Med  06/23/20 13:15 Active





 IV ASDIRECTED   


 


 Sodium Chloride 0.9% [Normal Saline] 76 ml Med  06/23/20 13:15 Active





 IV ASDIRECTED   


 


 Nasogastric Orogastric Tube Insertion [OM.PC] Routine Oth  06/23/20 15:43 

Ordered


 


 Pulse Oximetry Continuous Monitoring [OM.PC] Routine Oth  06/23/20 13:27 

Ordered


 


 Resuscitation Status Routine Resus Stat  06/23/20 16:52 Ordered


 


 EKG 12 Lead [EK] Urgent Ther  06/23/20 12:58 Ordered








                                Medication Orders





Sodium Chloride (Normal Saline)  1,000 mls @ 500 mls/hr IV ASDIRECTED [facility]


   Last Admin: 06/23/20 13:24  Dose: 500 mls/hr


   Documented by: PREILOR


Sodium Chloride (Normal Saline)  76 mls @ 3.3 mls/sec IV ASDIRECTED [facility]


   Last Admin: 06/23/20 13:44  Dose: 3.3 mls/sec


   Documented by: DECRMIC








Assessment/Plan Comment:: 





ASSESSMENT AND PLAN





SMALL BOWEL OBSTRUCTION-set of pain this morning associated with nausea, CT scan

 shows evidence of small bowel obstruction.  History of several previous 

abdominal surgeries.


-N.p.o.


-NG tube to low intermittent suction


-IV fluids for hydration


-Pain and nausea medication as needed


-Dr. Cotter to see for surgical consult in a.m.


-Follow-up abdominal flatplate and upright x-ray in a.m.





HISTORY OF CORONARY ARTERY DISEASE-status post previous coronary artery bypass 

surgery as well as angioplasties with stenting.  Currently asymptomatic denies 

recent symptoms of chest pain or pressure.


-Continue outpatient medications





MAINTENANCE ISSUES


-DVT prophylaxis; SCUDs, hold on anticoagulation because of possible surgery


-GI prophylaxis; not indicated


-Pastor catheter; not indicated


-Nutrition; n.p.o.


-Nicotine dependence; not required





CODE STATUS-FULL CODE





ADMISSION STATUS-patient will be admitted to inpatient status, expect at least a

 2 night hospital stay for evaluation and management of problems as outlined 

above.  At the time of this admission I do not reasonably expected evaluation 

and management of this problem will require more than a 96 hour hospital stay.





DISPOSITION-anticipate discharge to home after the hospital stay.





- Mortality Measure


Prognosis:: Good

## 2020-06-23 NOTE — CR
CHEST: 2 view

 

CLINICAL HISTORY:Epigastric pain

 

COMPARISON:October 20, 2019

 

FINDINGS:  The heart size, pulmonary vascularity and hilar structures are

normal. No infiltrate effusion or pneumothorax is seen. Patient has had previous

sternotomy. There are atherosclerotic changes in the aorta. Lungs are

hyperaerated. Small bowel distention is also seen on abdominal CT

 

IMPRESSION: No acute cardiopulmonary process.

 

Hyperaeration

## 2020-06-23 NOTE — CT
Abdomen Pelvis w Cont

 

CLINICAL HISTORY: Abdominal pain, distention, nausea  

 

COMPARISON: 2019. 

 

TECHNIQUE: Transverse scans were obtained from the base of the lungs to the

pubic symphysis following oral contrast and IV infusion of contrast.Auto dosage

reduction and iterative reconstructiontechniques employed.

 

FINDINGS: The lung bases are clear. The liver shows diffuse intrahepatic biliary

distention.. The gallbladder has been removed. Common hepatic duct measures 17

mm The spleen has a normal size and shape. The pancreas is poorly seen. There

may be some atrophy. There is severe diffuse small bowel distention. There are

more normal caliber small bowel loops in the right lower quadrant suggesting

obstruction within the mid to distal ileum. A definite transition point is not

identified. There is some, clockwise rotation of the descending mesenteric

vessels in the mid abdomen. This is new since prior study. The adrenal glands

are normal bilaterally . There is a punctate stone in the midpole of the right

kidney. Is no hydronephrosis. The left kidney is small with significant

parenchymal thinning. The ureters have a normal course and caliber as seen. The

aorta shows dense calcified plaque throughout. There is also calcified plaque in

the visceral arteries with probable moderate stenosis of the celiac trunk and

SMA. The renal arteries are densely calcified. The bladder is distended and

thick-walled. There is a very large prostate

There is no suspicious retroperitoneal adenopathy. 

 

IMPRESSION: Significant small bowel distention with more normal bowel caliber

involving the mid to distal ileum. This is suspect for obstruction. Clinical

correlation is necessary.

 

There is now a common clock wise rotation of descending mesenteric vessels which

was not present on our study

 

Moderate diffuse intrahepatic biliary dilatation is new since the 2019 study.

Some. No definite stone is seen. Patient has had previous cholecystectomy. An

element of distal common duct obstruction is not excluded.

 

Significant prostatic enlargement

## 2020-06-24 RX ADMIN — ASPIRIN SCH MG: 325 TABLET, DELAYED RELEASE ORAL at 10:16

## 2020-06-24 RX ADMIN — MAGNESIUM SULFATE IN WATER SCH MLS/HR: 40 INJECTION, SOLUTION INTRAVENOUS at 21:26

## 2020-06-24 RX ADMIN — Medication SCH MG: at 10:16

## 2020-06-24 RX ADMIN — Medication SCH MG: at 21:23

## 2020-06-24 RX ADMIN — MAGNESIUM SULFATE IN WATER SCH MLS/HR: 40 INJECTION, SOLUTION INTRAVENOUS at 10:21

## 2020-06-24 RX ADMIN — MAGNESIUM SULFATE IN WATER SCH MLS/HR: 40 INJECTION, SOLUTION INTRAVENOUS at 16:38

## 2020-06-24 NOTE — PCM.CONS
H&P History of Present Illness





- General


Date of Service: 06/24/20


Admit Problem/Dx: 


                           Admission Diagnosis/Problem





Admission Diagnosis/Problem      Partial Small Bowel Obstruction











Source of Information: Patient


History Limitations: Reports: No Limitations





- History of Present Illness


Onset of Symptoms: Reports: Gradual


Location: Reports: Abdomen


Quality: Reports: Ache


Severity: Mild


Improves with: Reports: None


Worsens with: Reports: None


Associated Symptoms: Reports: Nausea/Vomiting


Other HPI/Comments: 





Sai reports that he had severe abdominal pain yesterday and went to the ED

by ambulance. 


He states he has had problems with his stomach for years but isn't really 

specific on his symptoms. 


Reports yesterday severe abdominal pain and nausea. 


  ** Abdomen


Pain Score (Numeric/FACES): 5





- Related Data


Allergies/Adverse Reactions: 


                                    Allergies











Allergy/AdvReac Type Severity Reaction Status Date / Time


 


No Known Allergies Allergy   Verified 04/12/16 16:51











Home Medications: 


                                    Home Meds





Aspirin 325 mg PO DAILY 05/31/14 [History]


Lisinopril/Hydrochlorothiazide [Lisinopril-Hctz 20-12.5 mg Tab] 1 each PO BID 

05/31/14 [History]


Metoprolol Succinate [Toprol XL 50mg] 1 tab PO DAILY 05/31/14 [History]


Nitroglycerin 0.4 mg SL ASDIRECTED PRN 05/31/14 [History]


dilTIAZem HCL [Diltiazem 12Hr ER] 90 mg PO BID 05/31/14 [History]


Hydrocodone/Acetaminophen [Hydrocodon-Acetaminophn ] 2 tab PO Q8H PRN 

10/23/19 [History]


LORazepam 0.5 mg PO Q8H PRN 10/23/19 [History]











Past Medical History


HEENT History: Reports: Impaired Vision


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


Gastrointestinal History: Reports: Diverticulosis, GERD


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


Musculoskeletal History: Reports: Arthritis, Back Pain, Chronic


Other Oncologic History: skin


Other Dermatologic History: skin cancer





- Infectious Disease History


Infectious Disease History: Reports: Chicken Pox, Measles, Mumps





- Past Surgical History


Cardiovascular Surgical History: Reports: Valve Replacement, Other (See Below)


GI Surgical History: Reports: Appendectomy, Cholecystectomy, Colonoscopy, EGD, 

Hernia Repair/Other


Male  Surgical History: Reports: None





Social & Family History





- Family History


Cardiac: Reports: CAD, MI


: Reports: Renal Disease/Insufficiency


Oncologic: Reports: Brain





- Tobacco Use


Smoking Status *Q: Former Smoker


Years of Tobacco use: 10


Used Tobacco, but Quit: Yes


Month/Year Tobacco Last Used: 61 years ago


Second Hand Smoke Exposure: No





- Caffeine Use


Caffeine Use: Reports: Coffee


Caffeine Use Comment: rarely





- Recreational Drug Use


Recreational Drug Use: No





H&P Review of Systems





- Review of Systems:


Review Of Systems: Comprehensive ROS is negative, except as noted in HPI.





Exam





- Exam


Exam: See Below





- Vital Signs


Vital Signs: 


                                Last Vital Signs











Temp  97.5 F   06/24/20 05:00


 


Pulse  64   06/24/20 05:00


 


Resp  18   06/24/20 05:00


 


BP  151/57 H  06/24/20 05:00


 


Pulse Ox  100   06/24/20 05:00











Weight: 123 lb 9.6 oz





- Exam


General: Alert, Oriented


HEENT: PERRLA


Neck: Supple


Lungs: Clear to Auscultation, Normal Respiratory Effort


Cardiovascular: Regular Rate, Regular Rhythm


GI/Abdominal Exam: Normal Bowel Sounds, Soft, Distended


 (Male) Exam: Deferred


Rectal (Males) Exam: Deferred


Back Exam: Normal Inspection, Full Range of Motion


Extremities: Normal Inspection, Normal Range of Motion


Skin: Warm, Dry, Intact


Neurological: Cranial Nerves Intact, Reflexes Equal Bilateral


Neuro Extensive - Mental Status: Alert, Oriented x3, Normal Mood/Affect


Neuro Extensive - Motor, Sensory, Reflexes: CN II-XII Intact, Normal Gait


Psychiatric: Alert, Normal Affect, Normal Mood





- Patient Data


Lab Results Last 24 hrs: 


                         Laboratory Results - last 24 hr











  06/23/20 06/23/20 06/23/20 Range/Units





  13:12 13:12 13:12 


 


WBC   6.4   (4.5-11.0)  K/uL


 


RBC   3.85 L   (4.30-5.90)  M/uL


 


Hgb   11.0 L   (12.0-15.0)  g/dL


 


Hct   34.7 L   (40.0-54.0)  %


 


MCV   90   (80-98)  fL


 


MCH   29   (27-31)  pg


 


MCHC   32   (32-36)  %


 


Plt Count   249   (150-400)  K/uL


 


Neut % (Auto)     (36-66)  %


 


Lymph % (Auto)     (24-44)  %


 


Mono % (Auto)     (2-6)  %


 


Eos % (Auto)     (2-4)  %


 


Baso % (Auto)     (0-1)  %


 


PT    11.2  (9.5-12.0)  sec


 


INR    1.04  (0.80-1.20)  


 


Sodium     (140-148)  mmol/L


 


Potassium     (3.6-5.2)  mmol/L


 


Chloride     (100-108)  mmol/L


 


Carbon Dioxide     (21-32)  mmol/L


 


Anion Gap     (5.0-14.0)  mmol/L


 


BUN     (7-18)  mg/dL


 


Creatinine     (0.8-1.3)  mg/dL


 


Est Cr Clr Drug Dosing     mL/min


 


Estimated GFR (MDRD)     (>60)  


 


BUN/Creatinine Ratio     


 


Glucose     ()  mg/dL


 


Lactic Acid     (0.4-2.0)  mmol/L


 


Calcium     (8.5-10.1)  mg/dL


 


Magnesium     (1.8-2.4)  mg/dL


 


Total Bilirubin     (0.2-1.0)  mg/dL


 


AST     (15-37)  U/L


 


ALT     (12-78)  U/L


 


Alkaline Phosphatase     ()  U/L


 


Troponin I  < 0.017    (0.000-0.056)  ng/mL


 


Total Protein     (6.4-8.2)  g/dL


 


Albumin     (3.4-5.0)  g/dL


 


Globulin     (2.3-3.5)  g/dL


 


Albumin/Globulin Ratio     (1.2-2.2)  


 


Lipase     ()  U/L


 


Urine Color     (YELLOW)  


 


Urine Appearance     (CLEAR)  


 


Urine pH     (5.0-8.0)  


 


Ur Specific Gravity     (1.008-1.030)  


 


Urine Protein     (NEGATIVE)  mg/dL


 


Urine Glucose (UA)     (NEGATIVE)  mg/dL


 


Urine Ketones     (NEGATIVE)  mg/dL


 


Urine Occult Blood     (NEGATIVE)  


 


Urine Nitrite     (NEGATIVE)  


 


Urine Bilirubin     (NEGATIVE)  


 


Urine Urobilinogen     (0.2-1.0)  EU/dL


 


Ur Leukocyte Esterase     (NEGATIVE)  


 


Urine RBC     (0-5)  


 


Urine WBC     (0-5)  


 


Ur Epithelial Cells     


 


Amorphous Sediment     


 


Urine Bacteria     


 


Urine Mucus     














  06/23/20 06/23/20 06/23/20 Range/Units





  13:12 13:12 13:12 


 


WBC     (4.5-11.0)  K/uL


 


RBC     (4.30-5.90)  M/uL


 


Hgb     (12.0-15.0)  g/dL


 


Hct     (40.0-54.0)  %


 


MCV     (80-98)  fL


 


MCH     (27-31)  pg


 


MCHC     (32-36)  %


 


Plt Count     (150-400)  K/uL


 


Neut % (Auto)     (36-66)  %


 


Lymph % (Auto)     (24-44)  %


 


Mono % (Auto)     (2-6)  %


 


Eos % (Auto)     (2-4)  %


 


Baso % (Auto)     (0-1)  %


 


PT     (9.5-12.0)  sec


 


INR     (0.80-1.20)  


 


Sodium  134 L    (140-148)  mmol/L


 


Potassium  4.1    (3.6-5.2)  mmol/L


 


Chloride  96 L    (100-108)  mmol/L


 


Carbon Dioxide  32    (21-32)  mmol/L


 


Anion Gap  10.1    (5.0-14.0)  mmol/L


 


BUN  16    (7-18)  mg/dL


 


Creatinine  1.3    (0.8-1.3)  mg/dL


 


Est Cr Clr Drug Dosing  40.19    mL/min


 


Estimated GFR (MDRD)  52 L    (>60)  


 


BUN/Creatinine Ratio  Not Reportable    


 


Glucose  143 H    ()  mg/dL


 


Lactic Acid   1.6   (0.4-2.0)  mmol/L


 


Calcium  9.2    (8.5-10.1)  mg/dL


 


Magnesium     (1.8-2.4)  mg/dL


 


Total Bilirubin  0.7  D    (0.2-1.0)  mg/dL


 


AST  19    (15-37)  U/L


 


ALT  22    (12-78)  U/L


 


Alkaline Phosphatase  76    ()  U/L


 


Troponin I     (0.000-0.056)  ng/mL


 


Total Protein  7.6    (6.4-8.2)  g/dL


 


Albumin  3.8    (3.4-5.0)  g/dL


 


Globulin  3.8 H    (2.3-3.5)  g/dL


 


Albumin/Globulin Ratio  1.0 L    (1.2-2.2)  


 


Lipase    84  ()  U/L


 


Urine Color     (YELLOW)  


 


Urine Appearance     (CLEAR)  


 


Urine pH     (5.0-8.0)  


 


Ur Specific Gravity     (1.008-1.030)  


 


Urine Protein     (NEGATIVE)  mg/dL


 


Urine Glucose (UA)     (NEGATIVE)  mg/dL


 


Urine Ketones     (NEGATIVE)  mg/dL


 


Urine Occult Blood     (NEGATIVE)  


 


Urine Nitrite     (NEGATIVE)  


 


Urine Bilirubin     (NEGATIVE)  


 


Urine Urobilinogen     (0.2-1.0)  EU/dL


 


Ur Leukocyte Esterase     (NEGATIVE)  


 


Urine RBC     (0-5)  


 


Urine WBC     (0-5)  


 


Ur Epithelial Cells     


 


Amorphous Sediment     


 


Urine Bacteria     


 


Urine Mucus     














  06/23/20 06/24/20 06/24/20 Range/Units





  14:57 05:24 05:24 


 


WBC   5.9   (4.5-11.0)  K/uL


 


RBC   3.72 L   (4.30-5.90)  M/uL


 


Hgb   10.9 L   (12.0-15.0)  g/dL


 


Hct   33.5 L   (40.0-54.0)  %


 


MCV   90   (80-98)  fL


 


MCH   29   (27-31)  pg


 


MCHC   33   (32-36)  %


 


Plt Count   245   (150-400)  K/uL


 


Neut % (Auto)   66   (36-66)  %


 


Lymph % (Auto)   21 L   (24-44)  %


 


Mono % (Auto)   12 H   (2-6)  %


 


Eos % (Auto)   1 L   (2-4)  %


 


Baso % (Auto)   0   (0-1)  %


 


PT     (9.5-12.0)  sec


 


INR     (0.80-1.20)  


 


Sodium    134 L  (140-148)  mmol/L


 


Potassium    3.6  (3.6-5.2)  mmol/L


 


Chloride    100  (100-108)  mmol/L


 


Carbon Dioxide    31  (21-32)  mmol/L


 


Anion Gap    6.6  (5.0-14.0)  mmol/L


 


BUN    11  (7-18)  mg/dL


 


Creatinine    1.2  (0.8-1.3)  mg/dL


 


Est Cr Clr Drug Dosing    35.69  mL/min


 


Estimated GFR (MDRD)    58 L  (>60)  


 


BUN/Creatinine Ratio     


 


Glucose    116 H  ()  mg/dL


 


Lactic Acid     (0.4-2.0)  mmol/L


 


Calcium    8.8  (8.5-10.1)  mg/dL


 


Magnesium    1.7 L  (1.8-2.4)  mg/dL


 


Total Bilirubin     (0.2-1.0)  mg/dL


 


AST     (15-37)  U/L


 


ALT     (12-78)  U/L


 


Alkaline Phosphatase     ()  U/L


 


Troponin I     (0.000-0.056)  ng/mL


 


Total Protein     (6.4-8.2)  g/dL


 


Albumin     (3.4-5.0)  g/dL


 


Globulin     (2.3-3.5)  g/dL


 


Albumin/Globulin Ratio     (1.2-2.2)  


 


Lipase     ()  U/L


 


Urine Color  Yellow    (YELLOW)  


 


Urine Appearance  Clear    (CLEAR)  


 


Urine pH  7.5    (5.0-8.0)  


 


Ur Specific Gravity  1.015    (1.008-1.030)  


 


Urine Protein  Negative    (NEGATIVE)  mg/dL


 


Urine Glucose (UA)  Negative    (NEGATIVE)  mg/dL


 


Urine Ketones  Negative    (NEGATIVE)  mg/dL


 


Urine Occult Blood  Trace-lysed H    (NEGATIVE)  


 


Urine Nitrite  Negative    (NEGATIVE)  


 


Urine Bilirubin  Negative    (NEGATIVE)  


 


Urine Urobilinogen  0.2    (0.2-1.0)  EU/dL


 


Ur Leukocyte Esterase  Negative    (NEGATIVE)  


 


Urine RBC  0-5    (0-5)  


 


Urine WBC  0-5    (0-5)  


 


Ur Epithelial Cells  Rare    


 


Amorphous Sediment  Not seen    


 


Urine Bacteria  Rare    


 


Urine Mucus  Not seen    











Result Diagrams: 


                                 06/24/20 05:24





                                 06/24/20 05:24





Sepsis Event Note





- Evaluation


Sepsis Screening Result: No Definite Risk





- Focused Exam


Vital Signs: 


                                   Vital Signs











  Temp Pulse Resp BP BP Pulse Ox


 


 06/24/20 05:00  97.5 F  64  18   151/57 H  100


 


 06/23/20 23:09  98.7 F  65  16   146/61 H  98


 


 06/23/20 21:45     156/61 H  











Date Exam was Performed: 06/24/20


Time Exam was Performed: 08:40





Consult PN Assessment/Plan


Procedures: 


Procedures





ASSAY OF CK (CPK) (04/12/16)


ASSAY OF LIPASE (10/23/19)


ASSAY OF TROPONIN QUANT (10/23/19)


CHEST X-RAY 1 VIEW FRONTAL (12/29/16)


COMPLETE CBC AUTOMATED (10/23/19)


COMPLETE CBC W/AUTO DIFF WBC (10/23/19)


COMPREHEN METABOLIC PANEL (10/23/19)


CT ABD & PELVIS W/O CONTRAST (10/23/19)


ELECTROCARDIOGRAM REPORT (10/23/19)


ELECTROCARDIOGRAM TRACING (10/23/19)


EMERGENCY DEPT VISIT (10/23/19)


EMERGENCY DEPT VISIT (10/23/19)


EMERGENCY DEPT VISIT (12/29/16)


EMERGENCY DEPT VISIT (12/29/16)


EMERGENCY DEPT VISIT (05/31/14)


EMERGENCY DEPT VISIT (05/31/14)


EXTREMITY STUDY (07/18/19)


HYDRATE IV INFUSION ADD-ON (10/23/19)


IIV NO PRSV INCREASED AG IM (10/23/19)


INFLUENZA ASSAY W/OPTIC (10/23/19)


INITIAL OBSERVATION CARE (10/23/19)


INSERT TEMP BLADDER CATH (10/23/19)


METABOLIC PANEL TOTAL CA (10/23/19)


OBSERVATION CARE DISCHARGE (10/23/19)


ROUTINE VENIPUNCTURE (10/23/19)


SUBSEQUENT OBSERVATION CARE (10/23/19)


THER/PROPH/DIAG INJ IV PUSH (10/23/19)


TTE W/DOPPLER COMPLETE (12/19/13)


TX/PRO/DX INJ NEW DRUG ADDON (10/23/19)


URINALYSIS AUTO W/SCOPE (10/23/19)


X-RAY EXAM CHEST 2 VIEWS (10/23/19)


X-RAY EXAM OF ABDOMEN (04/12/16)








(1) Small bowel obstruction


SNOMED Code(s): 712580372


   Code(s): K56.609 - UNSP INTESTNL OBST, UNSP AS TO PARTIAL VERSUS COMPLETE 

OBST   Current Visit: Yes   


Problem List Initiated/Reviewed/Updated: Yes


My Orders Last 24 Hours: 


My Active Orders





06/24/20 07:39


Bladder Scan [RC] ASDIRECTED 





06/24/20 09:00


bisacodyL [Dulcolax]   10 mg RECTAL BID 





06/24/20 10:00


Magnesium Sulfate/Water [Magnesium Sulfate in Water Premix] 2 gm   Premix Bag 1 

bag IV Q6H 





06/24/20 21:00


Tamsulosin [Flomax]   0.4 mg PO BEDTIME 





06/25/20 04:00


Abdomen 2V AP Flat Upright [CR] DAILY 





06/26/20 04:00


Abdomen 2V AP Flat Upright [CR] DAILY 





06/27/20 04:00


Abdomen 2V AP Flat Upright [CR] DAILY

## 2020-06-24 NOTE — CR
Abdomen 2V AP Upright Decub

 

CLINICAL HISTORY: SBO 

 

FINDINGS: Flat and upright views the abdomen show moderate gaseous distention of

small bowel with a few scattered air-fluid levels. There is gas and feces colon.

There is some gastric distention.

 

IMPRESSION: Persistent small bowel and now gastric distention. Overall

appearance is similar to prior study if not slightly improved with more air in

the colon.

## 2020-06-24 NOTE — PCM.PN
- General Info


Date of Service: 06/24/20


Subjective Update: 





Mr. Rivera has improved since admission, no further abdominal pain or nausea.  

NG tube has been removed, he has been passing gas and has had a few bowel 

movements this afternoon.


Functional Status: Reports: Ambulating, Urinating





- Review of Systems


General: Reports: No Symptoms


Pulmonary: Reports: No Symptoms


Cardiovascular: Reports: No Symptoms


Gastrointestinal: Reports: No Symptoms





- Patient Data


Vitals - Most Recent: 


                                Last Vital Signs











Temp  97.2 F   06/24/20 13:00


 


Pulse  70   06/24/20 13:00


 


Resp  16   06/24/20 13:00


 


BP  132/60   06/24/20 13:00


 


Pulse Ox  100   06/24/20 13:00











Weight - Most Recent: 123 lb


I&O - Last 24 Hours: 


                                 Intake & Output











 06/24/20 06/24/20 06/24/20





 06:59 14:59 22:59


 


Intake Total 1436 50 


 


Output Total 900 2150 


 


Balance 536 -2100 











Lab Results Last 24 Hours: 


                         Laboratory Results - last 24 hr











  06/24/20 06/24/20 Range/Units





  05:24 05:24 


 


WBC  5.9   (4.5-11.0)  K/uL


 


RBC  3.72 L   (4.30-5.90)  M/uL


 


Hgb  10.9 L   (12.0-15.0)  g/dL


 


Hct  33.5 L   (40.0-54.0)  %


 


MCV  90   (80-98)  fL


 


MCH  29   (27-31)  pg


 


MCHC  33   (32-36)  %


 


Plt Count  245   (150-400)  K/uL


 


Neut % (Auto)  66   (36-66)  %


 


Lymph % (Auto)  21 L   (24-44)  %


 


Mono % (Auto)  12 H   (2-6)  %


 


Eos % (Auto)  1 L   (2-4)  %


 


Baso % (Auto)  0   (0-1)  %


 


Sodium   134 L  (140-148)  mmol/L


 


Potassium   3.6  (3.6-5.2)  mmol/L


 


Chloride   100  (100-108)  mmol/L


 


Carbon Dioxide   31  (21-32)  mmol/L


 


Anion Gap   6.6  (5.0-14.0)  mmol/L


 


BUN   11  (7-18)  mg/dL


 


Creatinine   1.2  (0.8-1.3)  mg/dL


 


Est Cr Clr Drug Dosing   35.69  mL/min


 


Estimated GFR (MDRD)   58 L  (>60)  


 


Glucose   116 H  ()  mg/dL


 


Calcium   8.8  (8.5-10.1)  mg/dL


 


Magnesium   1.7 L  (1.8-2.4)  mg/dL











Med Orders - Current: 


                               Current Medications





Aspirin (Ecotrin)  325 mg PO DAILY [facility]


   Last Admin: 06/24/20 10:16 Dose:  325 mg


   Documented by: 


Bisacodyl (Dulcolax)  10 mg RECTAL BID [facility]


   Last Admin: 06/24/20 10:16 Dose:  10 mg


   Documented by: 


Hydrochlorothiazide (Hydrochlorothiazide)  12.5 mg PO BID [facility]


   Last Admin: 06/24/20 10:16 Dose:  12.5 mg


   Documented by: 


Hydromorphone HCl (Dilaudid)  0.5 mg IVPUSH Q1H PRN


   PRN Reason: Pain


   Last Admin: 06/24/20 14:36 Dose:  0.5 mg


   Documented by: 


Magnesium Sulfate 2 gm/ Premix  50 mls @ 25 mls/hr IV Q6H [facility]


   Stop: 06/27/20 05:59


   Last Admin: 06/24/20 10:21 Dose:  25 mls/hr


   Documented by: 


Lisinopril (Prinivil)  20 mg PO BID [facility]


   Last Admin: 06/24/20 10:21 Dose:  20 mg


   Documented by: 


Lorazepam (Ativan)  0.5 mg PO Q4H PRN


   PRN Reason: Anxiety


Metoprolol Succinate (Toprol Xl)  50 mg PO DAILY [facility]


   Last Admin: 06/24/20 10:17 Dose:  50 mg


   Documented by: 


Diltiazem 12hr Er 90 (Mg **Ptom**)  0 mg PO BID [facility]


   Last Admin: 06/24/20 10:16 Dose:  90 mg


   Documented by: 


Ondansetron HCl (Zofran)  4 mg IV Q4H PRN


   PRN Reason: Nausea/Vomiting


Sodium Chloride (Saline Flush)  10 ml FLUSH ASDIRECTED PRN


   PRN Reason: Keep Vein Open


Tamsulosin HCl (Flomax)  0.4 mg PO BEDTIME [facility]





Discontinued Medications





Hydromorphone HCl (Dilaudid)  0.5 mg IVPUSH ONETIME ONE


   Stop: 06/23/20 13:03


   Last Admin: 06/23/20 13:28 Dose:  0.5 mg


   Documented by: 


Hydromorphone HCl (Dilaudid)  0.5 mg IVPUSH ONETIME ONE


   Stop: 06/23/20 15:44


   Last Admin: 06/23/20 16:35 Dose:  0.5 mg


   Documented by: 


Sodium Chloride (Normal Saline)  1,000 mls @ 500 mls/hr IV ASDIRECTED [facility]


   Last Admin: 06/23/20 13:24 Dose:  500 mls/hr


   Documented by: 


Sodium Chloride (Normal Saline)  76 mls @ 3.3 mls/sec IV ASDIRECTED [facility]


   Last Admin: 06/23/20 13:44 Dose:  3.3 mls/sec


   Documented by: 


Lactated Ringer's (Ringers, Lactated)  1,000 mls @ 125 mls/hr IV ASDIRECTED [facility]


   Last Admin: 06/24/20 09:37 Dose:  125 mls/hr


   Documented by: 


Iopamidol (Isovue-300 (61%))  100 ml IV .AS DIRECTED ONE


   Stop: 06/23/20 13:14


   Last Admin: 06/23/20 13:44 Dose:  100 ml


   Documented by: 


Lidocaine HCl (Xylocaine 2% Jelly)  10 ml MUCMEM ONETIME ONE


   Stop: 06/24/20 10:33


   Last Admin: 06/24/20 10:39 Dose:  10 ml


   Documented by: 


Ondansetron HCl (Zofran)  4 mg IVPUSH ONETIME ONE


   Stop: 06/23/20 13:03


   Last Admin: 06/23/20 13:26 Dose:  4 mg


   Documented by: 


Tamsulosin HCl (Flomax)  0.4 mg PO ONETIME ONE


   Stop: 06/24/20 08:01


   Last Admin: 06/24/20 10:41 Dose:  0.4 mg


   Documented by: 











- Exam


General: Alert, Oriented, Cooperative, No Acute Distress


Lungs: Clear to Auscultation, Normal Respiratory Effort


Cardiovascular: Regular Rate, Regular Rhythm, No Murmurs


GI/Abdominal Exam: Soft, Non-Tender, No Organomegaly, No Distention


Extremities: Non-Tender, No Pedal Edema





Sepsis Event Note





- Evaluation


Sepsis Screening Result: No Definite Risk





- Focused Exam


Vital Signs: 


                                   Vital Signs











  Temp Pulse Pulse Resp BP BP Pulse Ox


 


 06/24/20 13:00  97.2 F   70  16   132/60  100


 


 06/24/20 10:21      135/59 L  


 


 06/24/20 10:17   69    135/59 L  


 


 06/24/20 09:00  97 F   68  18   135/59 L  100


 


 06/24/20 05:00  97.5 F   64  18   151/57 H  100











Date Exam was Performed: 06/24/20


Time Exam was Performed: 15:18





- Problem List Review


Problem List Initiated/Reviewed/Updated: Yes





- My Orders


Last 24 Hours: 


My Active Orders





06/23/20 16:52


Resuscitation Status Routine 





06/23/20 17:05


HYDROmorphone [Dilaudid]   0.5 mg IVPUSH Q1H PRN 


Ondansetron [Zofran]   4 mg IV Q4H PRN 


Sodium Chloride 0.9% [Saline Flush]   10 ml FLUSH ASDIRECTED PRN 





06/23/20 17:05


Patient Status [ADT] Routine 


Ambulate [RC] QID 


Gastrointestinal Tube Mgmt [RC] ASDIRECTED 


Height and Weight [RC] 0500 


Intake and Output [RC] QSHIFT 


Notify Provider Consults [RC] ASDIRECTED 


Notify Provider Vital Signs [RC] ASDIRECTED 


Oxygen Therapy [RC] PRN 


Peripheral IV Care [RC] .AS DIRECTED 


Up With Assistance [RC] ASDIRECTED 


Up to Chair [RC] QID 


Vital Signs [RC] Q4H 


Consult to Physician [CONS] Routine 


Peripheral IV Insertion Adult [OM.PC] Routine 


Sequential Compression Device [OM.PC] Per Unit Routine 





06/23/20 21:00


dilTIAZem HCL [Diltiazem 12Hr ER]   0 mg PO BID 


hydroCHLOROthiazide   12.5 mg PO BID 


lisinopriL [Prinivil]   20 mg PO BID 





06/24/20 09:00


Aspirin [Ecotrin]   325 mg PO DAILY 


Metoprolol Succinate [Toprol XL]   50 mg PO DAILY 





06/24/20 Lunch


Full Liquid Diet [DIET] 





06/24/20 15:15


LORazepam [Ativan]   0.5 mg PO Q4H PRN 





06/24/20 15:17


Convert IV to Saline Lock [OM.PC] Routine 














- Plan


Plan:: 





ASSESSMENT AND PLAN





SMALL BOWEL OBSTRUCTION-improved from admission, NG tube is out.  He has been 

passing gas today and this afternoon has had 2 bowel movements.  No abdominal 

pain or nausea.


-Saline lock IV


-Pain and nausea medication as needed


-Surgical follow-up per Dr. Cotter


-Full liquid diet


-Follow-up abdominal flatplate and upright x-ray in a.m.





HISTORY OF CORONARY ARTERY DISEASE-status post previous coronary artery bypass 

surgery as well as angioplasties with stenting.  Currently asymptomatic denies 

recent symptoms of chest pain or pressure.


-Continue outpatient medications





MAINTENANCE ISSUES


-DVT prophylaxis; SCUDs, hold on anticoagulation because of possible surgery


-GI prophylaxis; not indicated


-Pastor catheter; not indicated


-Nutrition; n.p.o.


-Nicotine dependence; not required





CODE STATUS-FULL CODE





ADMISSION STATUS-patient will be admitted to inpatient status, expect at least a

 2 night hospital stay for evaluation and management of problems as outlined 

above.  At the time of this admission I do not reasonably expected evaluation 

and management of this problem will require more than a 96 hour hospital stay.





DISPOSITION-anticipate discharge to home after the hospital stay.

## 2020-06-25 RX ADMIN — MAGNESIUM SULFATE IN WATER SCH: 40 INJECTION, SOLUTION INTRAVENOUS at 21:12

## 2020-06-25 RX ADMIN — ASPIRIN SCH MG: 325 TABLET, DELAYED RELEASE ORAL at 09:18

## 2020-06-25 RX ADMIN — MAGNESIUM SULFATE IN WATER SCH MLS/HR: 40 INJECTION, SOLUTION INTRAVENOUS at 09:19

## 2020-06-25 RX ADMIN — Medication SCH MG: at 09:19

## 2020-06-25 RX ADMIN — MAGNESIUM SULFATE IN WATER SCH MLS/HR: 40 INJECTION, SOLUTION INTRAVENOUS at 16:12

## 2020-06-25 RX ADMIN — MAGNESIUM SULFATE IN WATER SCH MLS/HR: 40 INJECTION, SOLUTION INTRAVENOUS at 04:18

## 2020-06-25 RX ADMIN — Medication SCH MG: at 20:13

## 2020-06-25 NOTE — CR
Abdomen 2V AP Flat Upright

 

CLINICAL HISTORY: Partial SBO

 

FINDINGS: There is some decrease in small bowel distention. There is an increase

in air passing into the colon. No free air is seen

 

IMPRESSION: Decrease in small bowel distention.

 

There is more air in the right and transverse colon. There is minimal gas and

feces in the left colon and rectosigmoid region. No colon cut off sign is

identified but this should be followed to exclude some colonic obstruction.

## 2020-06-25 NOTE — PN
DATE OF SERVICE:  06/25/2020

 

SUBJECTIVE:  Sai started having bowel movements and he has had 6 medium to large.  His

abdominal flat and upright x-ray this morning showed that the partial small bowel

obstruction has resolved.  He had 900 post void residual, and he has a Pastor catheter in.

This will be referred to Dr. Moss in regard to treatment of his urinary retention or

inability to completely empty out his bladder.

 

REVIEW OF SYSTEMS:  CONSTITUTIONAL:  No fever.

HEENT:  Negative.

NECK:  Negative.

CHEST:  No chest pain, shortness of breath, fast or irregular heart beat.

LUNGS:  No cough.

ABDOMEN:  No nausea, vomiting.  No abdominal pain.  Continues to be slightly bloated he

states.

GENITOURINARY:  As above.

EXTREMITIES:  Negative.

SKIN:  Without rash.

PSYCHIATRIC:  Negative.

NEUROLOGIC:  Negative.

 

Remainder of review of systems negative for any pertinent positives and negatives.

 

OBJECTIVE:  GENERAL:  Sai is a pleasant 85-year-old male.  He is alert and orientated.

VITAL SIGNS:  TPR from 0700 is 98.6; 72; 17; blood pressure 148/53.

HEENT:  Negative.

NECK:  Supple.

HEART:  Regular rate and rhythm.

LUNGS:  Clear.

ABDOMEN:  Remains to be soft.  It is nontender and slightly bloated.

EXTREMITIES:  Without peripheral edema.

 

ASSESSMENT:  Partial small-bowel obstruction.

 

PLAN:

1. Increase diet to low residue soft GI diet.

2. Dietary consult in regard to dietary instructions.

3. Dr. Moss to discharge and evaluate urinary incontinence.

4. We will evaluate p.r.n. or in a.m.

 

 

 

Eli Cao PA-C

DD:  06/25/2020 09:38:26

DT:  06/25/2020 10:15:29

Job #:  [id number]

## 2020-06-25 NOTE — PCM.PN
- General Info


Date of Service: 06/25/20


Subjective Update: 





Mr. Rivera has been stable since yesterday, tolerating a soft diet this morning.

 He has had several bowel movements with no further evidence of bowel 

obstruction.  Pastor catheter was placed because of urinary tract obstruction and

he was started on Flomax.  He much prefers to be discharged without the urinary 

catheter even if it means that he is retaining urine.


Functional Status: Reports: Tolerating Diet, Ambulating, Urinating





- Review of Systems


General: Reports: Weakness.  Denies: Fever, Chills


Pulmonary: Reports: No Symptoms


Cardiovascular: Reports: No Symptoms


Gastrointestinal: Reports: No Symptoms





- Patient Data


Vitals - Most Recent: 


                                Last Vital Signs











Temp  97.6 F   06/25/20 11:00


 


Pulse  77   06/25/20 11:00


 


Resp  16   06/25/20 11:00


 


BP  136/59 L  06/25/20 11:00


 


Pulse Ox  100   06/25/20 11:00











Weight - Most Recent: 119 lb 3.2 oz


I&O - Last 24 Hours: 


                                 Intake & Output











 06/24/20 06/25/20 06/25/20





 22:59 06:59 14:59


 


Intake Total 240  400


 


Output Total 325 500 


 


Balance -85 -500 400











Med Orders - Current: 


                               Current Medications





Aspirin (Ecotrin)  325 mg PO DAILY [facility]


   Last Admin: 06/25/20 09:18 Dose:  325 mg


   Documented by: 


Bisacodyl (Dulcolax)  10 mg RECTAL BID [facility]


   Last Admin: 06/25/20 09:34 Dose:  Not Given


   Documented by: 


Hydrochlorothiazide (Hydrochlorothiazide)  12.5 mg PO BID [facility]


   Last Admin: 06/25/20 09:17 Dose:  12.5 mg


   Documented by: 


Magnesium Sulfate 2 gm/ Premix  50 mls @ 25 mls/hr IV Q6H [facility]


   Stop: 06/27/20 05:59


   Last Admin: 06/25/20 09:19 Dose:  25 mls/hr


   Documented by: 


Lisinopril (Prinivil)  20 mg PO BID [facility]


   Last Admin: 06/25/20 09:18 Dose:  20 mg


   Documented by: 


Lorazepam (Ativan)  0.5 mg PO Q4H PRN


   PRN Reason: Anxiety


   Last Admin: 06/24/20 16:49 Dose:  0.5 mg


   Documented by: 


Metoprolol Succinate (Toprol Xl)  50 mg PO DAILY [facility]


   Last Admin: 06/25/20 09:18 Dose:  50 mg


   Documented by: 


Diltiazem 12hr Er 90 (Mg **Ptom**)  0 mg PO BID [facility]


   Last Admin: 06/25/20 09:19 Dose:  90 mg


   Documented by: 


Ondansetron HCl (Zofran)  4 mg IV Q4H PRN


   PRN Reason: Nausea/Vomiting


Oxycodone HCl (Oxycodone)  5 mg PO Q4H PRN


   PRN Reason: Pain


Sodium Chloride (Saline Flush)  10 ml FLUSH ASDIRECTED PRN


   PRN Reason: Keep Vein Open


Tamsulosin HCl (Flomax)  0.4 mg PO BEDTIME [facility]


   Last Admin: 06/24/20 21:24 Dose:  0.4 mg


   Documented by: 





Discontinued Medications





Hydromorphone HCl (Dilaudid)  0.5 mg IVPUSH ONETIME ONE


   Stop: 06/23/20 13:03


   Last Admin: 06/23/20 13:28 Dose:  0.5 mg


   Documented by: 


Hydromorphone HCl (Dilaudid)  0.5 mg IVPUSH ONETIME ONE


   Stop: 06/23/20 15:44


   Last Admin: 06/23/20 16:35 Dose:  0.5 mg


   Documented by: 


Hydromorphone HCl (Dilaudid)  0.5 mg IVPUSH Q1H PRN


   PRN Reason: Pain


   Last Admin: 06/24/20 14:36 Dose:  0.5 mg


   Documented by: 


Sodium Chloride (Normal Saline)  1,000 mls @ 500 mls/hr IV ASDIRECTED [facility]


   Last Admin: 06/23/20 13:24 Dose:  500 mls/hr


   Documented by: 


Sodium Chloride (Normal Saline)  76 mls @ 3.3 mls/sec IV ASDIRECTED [facility]


   Last Admin: 06/23/20 13:44 Dose:  3.3 mls/sec


   Documented by: 


Lactated Ringer's (Ringers, Lactated)  1,000 mls @ 125 mls/hr IV ASDIRECTED [facility]


   Last Admin: 06/24/20 09:37 Dose:  125 mls/hr


   Documented by: 


Iopamidol (Isovue-300 (61%))  100 ml IV .AS DIRECTED ONE


   Stop: 06/23/20 13:14


   Last Admin: 06/23/20 13:44 Dose:  100 ml


   Documented by: 


Lidocaine HCl (Xylocaine 2% Jelly)  10 ml MUCMEM ONETIME ONE


   Stop: 06/24/20 10:33


   Last Admin: 06/24/20 10:39 Dose:  10 ml


   Documented by: 


Ondansetron HCl (Zofran)  4 mg IVPUSH ONETIME ONE


   Stop: 06/23/20 13:03


   Last Admin: 06/23/20 13:26 Dose:  4 mg


   Documented by: 


Tamsulosin HCl (Flomax)  0.4 mg PO ONETIME ONE


   Stop: 06/24/20 08:01


   Last Admin: 06/24/20 10:41 Dose:  0.4 mg


   Documented by: 











- Exam


Quality Assessment: DVT Prophylaxis


General: Alert, Oriented, Cooperative, Mild Distress


Lungs: Clear to Auscultation, Normal Respiratory Effort


Cardiovascular: Regular Rate, Regular Rhythm, No Murmurs


GI/Abdominal Exam: Soft, Non-Tender, No Organomegaly, No Distention


Extremities: Non-Tender, No Pedal Edema, Other (Changes of venous stasis both lo

wer extremities)





Sepsis Event Note





- Evaluation


Sepsis Screening Result: No Definite Risk





- Focused Exam


Vital Signs: 


                                   Vital Signs











  Temp Pulse Pulse Resp BP BP Pulse Ox


 


 06/25/20 11:00  97.6 F   77  16   136/59 L  100


 


 06/25/20 09:18   80    148/53 H  


 


 06/25/20 07:00  98.6 F   72  17   148/53 H  97


 


 06/25/20 03:10  98.4 F   78  18   124/71  98











Date Exam was Performed: 06/25/20


Time Exam was Performed: 12:54





- Problem List Review


Problem List Initiated/Reviewed/Updated: Yes





- My Orders


Last 24 Hours: 


My Active Orders





06/24/20 15:15


LORazepam [Ativan]   0.5 mg PO Q4H PRN 





06/24/20 15:17


Convert IV to Saline Lock [OM.PC] Routine 





06/24/20 15:23


oxyCODONE   5 mg PO Q4H PRN 





06/24/20 15:24


Resuscitation Status Routine 





06/25/20 12:54


Remove Pastor Catheter [Urinary Catheter Removal] [RC] Per Unit Routine 














- Plan


Plan:: 





ASSESSMENT AND PLAN





SMALL BOWEL OBSTRUCTION-resolved


-Pain and nausea medication as needed


-Surgical follow-up per Dr. Cotter


-Soft diet


-Follow-up abdominal flatplate and upright x-ray in a.m.





HISTORY OF CORONARY ARTERY DISEASE-status post previous coronary artery bypass 

surgery as well as angioplasties with stenting.  Currently asymptomatic denies 

recent symptoms of chest pain or pressure.


-Continue outpatient medications





BPH CAUSING BLADDER OUTLET OBSTRUCTION


-Remove Pastor catheter


-Flomax 0.4 mg p.o. nightly





MAINTENANCE ISSUES


-DVT prophylaxis; SCUDs, hold on anticoagulation because of possible surgery


-GI prophylaxis; not indicated


-Pastor catheter; not indicated


-Nutrition; n.p.o.


-Nicotine dependence; not required





CODE STATUS-FULL CODE





ADMISSION STATUS-patient will be admitted to inpatient status, expect at least a

 2 night hospital stay for evaluation and management of problems as outlined 

above.  At the time of this admission I do not reasonably expected evaluation 

and management of this problem will require more than a 96 hour hospital stay.





DISPOSITION-anticipate discharge to home after the hospital stay.

## 2020-06-26 VITALS — SYSTOLIC BLOOD PRESSURE: 130 MMHG | HEART RATE: 70 BPM | DIASTOLIC BLOOD PRESSURE: 60 MMHG

## 2020-06-26 RX ADMIN — Medication SCH MG: at 09:07

## 2020-06-26 RX ADMIN — MAGNESIUM SULFATE IN WATER SCH: 40 INJECTION, SOLUTION INTRAVENOUS at 03:25

## 2020-06-26 RX ADMIN — ASPIRIN SCH MG: 325 TABLET, DELAYED RELEASE ORAL at 09:07

## 2020-06-26 RX ADMIN — MAGNESIUM SULFATE IN WATER SCH: 40 INJECTION, SOLUTION INTRAVENOUS at 09:08

## 2020-06-26 NOTE — PCM.DCSUM1
**Discharge Summary





- Hospital Course


Brief History: Mr. Rivera is an 85-year-old gentleman who was admitted through 

the emergency department with abdominal pain and nausea secondary to small bowel

obstruction.





- Discharge Data


Discharge Date: 06/26/20


Discharge Disposition: Home, Self-Care 01


Condition: Fair





- Referral to Home Health


Primary Care Physician: 


PCP None








- Discharge Diagnosis/Problem(s)


(1) Urinary retention


SNOMED Code(s): 404829512


   ICD Code: R33.9 - RETENTION OF URINE, UNSPECIFIED   Status: Acute   Current 

Visit: Yes   





(2) BPH (benign prostatic hyperplasia)


SNOMED Code(s): 618179644


   ICD Code: N40.0 - BENIGN PROSTATIC HYPERPLASIA WITHOUT LOWER URINRY TRACT 

SYMP   Status: Acute   Current Visit: Yes   





(3) Small bowel obstruction


SNOMED Code(s): 944463035


   ICD Code: K56.609 - UNSP INTESTNL OBST, UNSP AS TO PARTIAL VERSUS COMPLETE 

OBST   Status: Acute   Current Visit: Yes   





(4) CAD (coronary artery disease)


SNOMED Code(s): 87401492


   ICD Code: I25.10 - ATHSCL HEART DISEASE OF NATIVE CORONARY ARTERY W/O ANG 

PCTRS   Status: Chronic   Current Visit: No   





- Patient Summary/Data


Consults: 


                                  Consultations





06/23/20 17:05


Consult to Physician [CONS] Routine 


   Consulting Provider: Barron Cotter Call Completed to Consulting Physician: Yes


   Reason for Consult: Small bowel obstruction





06/25/20 07:13


Consult to Dietician [CONS] Routine 


   Comment: 


   Physician Instructions: 


   Quantity: 


   Reason for Consult: instructions low residue diet long term











Hospital Course: 





Mr. Powell is an 85-year-old gentleman who was admitted through the emergency 

department with abdominal pain and nausea secondary to small bowel obstruction. 

 He did not feel well last night had a large bowel movement and then diarrhea 

afterwards.  After eating this morning he developed acute pain in his upper 

abdomen described as an intense ache which did not radiate.  He noted no other 

precipitating or relieving factors.  Because of the severe pain he was brought 

into the emergency department by EMS.  CT scan shows evidence of small bowel 

obstruction.  NG tube is been placed in the emergency department.  On admission 

NG tube was kept at low intermittent suction and he was given IV fluids for 

hydration.  He was also provided medication for pain and nausea as needed.  On 

the morning after discharge he was seen and evaluated by Dr. Cotter for a 

surgical consult.  He had started to pass gas and did have a few bowel 

movements.  NG tube was removed and he was started on a full liquid diet.  By 

the time of discharge he was tolerating a soft low residue diet without 

significant difficulty.  Hospital course was complicated by urinary retention 

with very high postvoid residuals.  Urinary catheter was placed and he was 

started on Flomax 0.4 mg daily.  Urinary catheter was removed on the day prior 

to discharge as the patient refused discharged home with a catheter.  He was 

able to urinate, but still had high postvoid residuals.  He will be discharged 

home on Flomax 0.4 mg in the evening.  Activity will be as tolerated and he will

 remain on a soft low residue diet.  Follow-up appointment will be scheduled 

with Dr. Hinton within 1 week.





- Patient Instructions


Diet: GI Soft/Low Residue/Low Fiber


Activity: As Tolerated


Other/Special Instructions: Please schedule follow-up appointment with Dr. Hinton within 1 week.





- Discharge Plan


*PRESCRIPTION DRUG MONITORING PROGRAM REVIEWED*: Not Applicable


*COPY OF PRESCRIPTION DRUG MONITORING REPORT IN PATIENT [facility]: Not Applicable


Prescriptions/Med Rec: 


Tamsulosin [Flomax] 0.4 mg PO BEDTIME #30 cap.er


Home Medications: 


                                    Home Meds





Aspirin 325 mg PO DAILY 05/31/14 [History]


Lisinopril/Hydrochlorothiazide [Lisinopril-Hctz 20-12.5 mg Tab] 1 each PO BID 

05/31/14 [History]


Metoprolol Succinate [Toprol XL 50mg] 1 tab PO DAILY 05/31/14 [History]


Nitroglycerin 0.4 mg SL ASDIRECTED PRN 05/31/14 [History]


dilTIAZem HCL [Diltiazem 12Hr ER] 90 mg PO BID 05/31/14 [History]


Hydrocodone/Acetaminophen [Hydrocodon-Acetaminophn ] 2 tab PO Q8H PRN 

10/23/19 [History]


LORazepam 0.5 mg PO Q8H PRN 10/23/19 [History]


Tamsulosin [Flomax] 0.4 mg PO BEDTIME #30 cap.er 06/26/20 [Rx]








Referrals: 


Shane Hinton MD [Physician] - 07/02/20 1:30 pm ([facility])





- Discharge Summary/Plan Comment


DC Time >30 min.: No





- Patient Data


Vitals - Most Recent: 


                                Last Vital Signs











Temp  97.4 F   06/26/20 07:49


 


Pulse  70   06/26/20 09:06


 


Resp  16   06/26/20 07:49


 


BP  130/60   06/26/20 09:06


 


Pulse Ox  99   06/26/20 07:49











Weight - Most Recent: 118 lb 12.8 oz


I&O - Last 24 hours: 


                                 Intake & Output











 06/25/20 06/26/20 06/26/20





 22:59 06:59 14:59


 


Intake Total 580 300 


 


Output Total 550 550 


 


Balance 30 -250 











Med Orders - Current: 


                               Current Medications





Aspirin (Ecotrin)  325 mg PO DAILY [facility]


   Last Admin: 06/26/20 09:07 Dose:  325 mg


   Documented by: 


Bisacodyl (Dulcolax)  10 mg RECTAL BID [facility]


   Last Admin: 06/26/20 09:06 Dose:  Not Given


   Documented by: 


Hydrochlorothiazide (Hydrochlorothiazide)  12.5 mg PO BID [facility]


   Last Admin: 06/26/20 09:07 Dose:  12.5 mg


   Documented by: 


Magnesium Sulfate 2 gm/ Premix  50 mls @ 25 mls/hr IV Q6H [facility]


   Stop: 06/27/20 05:59


   Last Admin: 06/26/20 09:08 Dose:  Not Given


   Documented by: 


Lisinopril (Prinivil)  20 mg PO BID [facility]


   Last Admin: 06/26/20 09:06 Dose:  20 mg


   Documented by: 


Lorazepam (Ativan)  0.5 mg PO Q4H PRN


   PRN Reason: Anxiety


   Last Admin: 06/25/20 20:19 Dose:  0.5 mg


   Documented by: 


Metoprolol Succinate (Toprol Xl)  50 mg PO DAILY [facility]


   Last Admin: 06/26/20 09:06 Dose:  50 mg


   Documented by: 


Diltiazem 12hr Er 90 (Mg **Ptom**)  0 mg PO BID [facility]


   Last Admin: 06/26/20 09:07 Dose:  1 mg


   Documented by: 


Ondansetron HCl (Zofran)  4 mg IV Q4H PRN


   PRN Reason: Nausea/Vomiting


Oxycodone HCl (Oxycodone)  5 mg PO Q4H PRN


   PRN Reason: Pain


   Last Admin: 06/25/20 20:19 Dose:  5 mg


   Documented by: 


Sodium Chloride (Saline Flush)  10 ml FLUSH ASDIRECTED PRN


   PRN Reason: Keep Vein Open


Tamsulosin HCl (Flomax)  0.4 mg PO BEDTIME [facility]


   Last Admin: 06/25/20 20:14 Dose:  0.4 mg


   Documented by: 





Discontinued Medications





Hydromorphone HCl (Dilaudid)  0.5 mg IVPUSH ONETIME ONE


   Stop: 06/23/20 13:03


   Last Admin: 06/23/20 13:28 Dose:  0.5 mg


   Documented by: 


Hydromorphone HCl (Dilaudid)  0.5 mg IVPUSH ONETIME ONE


   Stop: 06/23/20 15:44


   Last Admin: 06/23/20 16:35 Dose:  0.5 mg


   Documented by: 


Hydromorphone HCl (Dilaudid)  0.5 mg IVPUSH Q1H PRN


   PRN Reason: Pain


   Last Admin: 06/24/20 14:36 Dose:  0.5 mg


   Documented by: 


Sodium Chloride (Normal Saline)  1,000 mls @ 500 mls/hr IV ASDIRECTED [facility]


   Last Admin: 06/23/20 13:24 Dose:  500 mls/hr


   Documented by: 


Sodium Chloride (Normal Saline)  76 mls @ 3.3 mls/sec IV ASDIRECTED [facility]


   Last Admin: 06/23/20 13:44 Dose:  3.3 mls/sec


   Documented by: 


Lactated Ringer's (Ringers, Lactated)  1,000 mls [facility]


   Last Admin: 06/24/20 09:37 Dose:  125 mls/hr


   Documented by: 


Iopamidol (Isovue-300 (61%))  100 ml IV .AS DIRECTED ONE


   Stop: 06/23/20 13:14


   Last Admin: 06/23/20 13:44 Dose:  100 ml


   Documented by: 


Lidocaine HCl (Xylocaine 2% Jelly)  10 ml MUCMEM ONETIME ONE


   Stop: 06/24/20 10:33


   Last Admin: 06/24/20 10:39 Dose:  10 ml


   Documented by: 


Ondansetron HCl (Zofran)  4 mg IVPUSH ONETIME ONE


   Stop: 06/23/20 13:03


   Last Admin: 06/23/20 13:26 Dose:  4 mg


   Documented by: 


Tamsulosin HCl (Flomax)  0.4 mg PO ONETIME ONE


   Stop: 06/24/20 08:01


   Last Admin: 06/24/20 10:41 Dose:  0.4 mg


   Documented by: 











- Exam


Quality Assessment: Reports: DVT Prophylaxis


General: Reports: Alert, Oriented, Cooperative, No Acute Distress


Lungs: Reports: Clear to Auscultation, Normal Respiratory Effort, Decreased 

Breath Sounds


Cardiovascular: Reports: Regular Rate, Regular Rhythm, Murmurs


GI/Abdominal Exam: Soft, Non-Tender, No Organomegaly, No Distention


Extremities: Non-Tender, No Pedal Edema


Skin: Reports: Other (Venous stasis both lower extremities)

## 2020-06-26 NOTE — PN
DATE OF SERVICE:  06/26/2020

 

SUBJECTIVE:  Sai states he plans to go home today.  His Pastor catheter was removed

yesterday.  He is urinating without any difficulty.  Oral intake was 1180 and urine output

total 1100.  He is getting protein drinks, supplementing his meals.  States he does not eat

very much at home.

 

REVIEW OF SYSTEMS:  Remainder of review of systems negative for any pertinent positives and

negatives.

 

OBJECTIVE:  GENERAL:  Sai Rivera is a pleasant 85-year-old male.

VITAL SIGNS:  TPR at 07:49, 97.4; 65; 16; blood pressure 129/55.

HEENT:  Negative.

NECK:  Supple.

HEART:  Regular rate and rhythm.

LUNGS:  Clear.

ABDOMEN:  Soft, nondistended, and nontender.

EXTREMITIES:  Without peripheral edema.

 

ASSESSMENT:

1. Partial small-bowel obstruction.

2. Urinary retention.

 

PLAN:  Continue low residue soft GI diet.  Discharge per Blaise Moss MD.

 

 

 

 

Eli Cao PA-C

DD:  06/26/2020 08:42:44

DT:  06/26/2020 10:15:39

Job #:  [id number]

## 2020-06-26 NOTE — CR
Abdomen 2V AP Flat Upright

 

CLINICAL HISTORY: Partial SBO

 

FINDINGS: There is persistent small bowel distention and a few scattered

air-fluid levels. There is gas and feces in the colon. Configuration is similar

to the prior study. There are atherosclerotic changes in the aorta. 

 

IMPRESSION: Persistent small bowel distention similar to prior study

## 2021-04-08 ENCOUNTER — HOSPITAL ENCOUNTER (EMERGENCY)
Dept: HOSPITAL 11 - JP.ED | Age: 86
Discharge: HOME | End: 2021-04-08
Payer: MEDICARE

## 2021-04-08 VITALS — SYSTOLIC BLOOD PRESSURE: 124 MMHG | HEART RATE: 61 BPM | DIASTOLIC BLOOD PRESSURE: 51 MMHG

## 2021-04-08 DIAGNOSIS — Z79.82: ICD-10-CM

## 2021-04-08 DIAGNOSIS — Z90.49: ICD-10-CM

## 2021-04-08 DIAGNOSIS — K56.600: Primary | ICD-10-CM

## 2021-04-08 DIAGNOSIS — I25.10: ICD-10-CM

## 2021-04-08 DIAGNOSIS — I10: ICD-10-CM

## 2021-04-08 DIAGNOSIS — K21.9: ICD-10-CM

## 2021-04-08 DIAGNOSIS — Z79.899: ICD-10-CM

## 2021-04-08 NOTE — EDM.PDOC
ED HPI GENERAL MEDICAL PROBLEM





- General


Chief Complaint: Chest Pain


Stated Complaint: ADB PAIN


Time Seen by Provider: 04/08/21 07:10


Source of Information: Reports: Patient, EMS


History Limitations: Reports: No Limitations





- History of Present Illness


INITIAL COMMENTS - FREE TEXT/NARRATIVE: 





86-year-old male who has a history of coronary artery disease and also bowel 

obstructions, chronic lower extremity edema presents with abdominal pain for the

past 12 hours.  He had some loose stools and generalized malaise yesterday 

afternoon, then developed abdominal cramping and pain last evening.  It was very

painful overnight, especially in the left lower quadrant.  He continued to have 

a small amount of soft stools.  He called the ambulance this morning because it 

was still bothering him, he was given 2 nitro and some aspirin in route.  Was 

not short of breath, told EMS he was having some chest discomfort but he denies 

that to us.  He is now feeling better, his abdomen still feels "raw" and is 

mostly across the lower abdomen.  He is resting comfortably.  Vitals are normal.


Onset: Gradual


Duration: Hour(s): (Pain is been present for just over 12 hours)


Location: Reports: Abdomen (Especially left lower quadrant)


Quality: Reports: Ache, Sharp


Improves with: Reports: None


Worsens with: Reports: None


Associated Symptoms: Reports: Chest Pain (Some chest pain was possible, although

he denies it at this time), Loss of Appetite, Malaise.  Denies: Fever/Chills, 

Nausea/Vomiting, Shortness of Breath


  ** Abdomen


Pain Score (Numeric/FACES): 2





- Related Data


                                    Allergies











Allergy/AdvReac Type Severity Reaction Status Date / Time


 


No Known Allergies Allergy   Verified 04/08/21 06:56











Home Meds: 


                                    Home Meds





Aspirin 81 mg PO DAILY 05/31/14 [History]


Lisinopril/Hydrochlorothiazide [Lisinopril-Hctz 20-12.5 mg Tab] 1 each PO BID 

05/31/14 [History]


Metoprolol Succinate [Toprol XL 50mg] 1 tab PO DAILY 05/31/14 [History]


Nitroglycerin 0.4 mg SL ASDIRECTED PRN 05/31/14 [History]


dilTIAZem HCL [Diltiazem 12Hr ER] 90 mg PO BID 05/31/14 [History]


Hydrocodone/Acetaminophen [Hydrocodon-Acetaminophn ] 2 tab PO Q8H PRN 

10/23/19 [History]


LORazepam 0.5 mg PO Q8H PRN 10/23/19 [History]


Tamsulosin [Flomax] 0.4 mg PO BEDTIME #30 cap.er 06/26/20 [Rx]


Citalopram [Citalopram HBr] 10 mg PO DAILY 04/08/21 [History]


Pantoprazole [ProTONIX***] 40 mg PO DAILY 04/08/21 [History]


Simvastatin 80 mg PO BEDTIME 04/08/21 [History]











Past Medical History


HEENT History: Reports: Impaired Vision


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


Gastrointestinal History: Reports: Diverticulosis, GERD


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


Musculoskeletal History: Reports: Arthritis, Back Pain, Chronic


Psychiatric History: Reports: Anxiety, Depression


Other Oncologic History: skin


Other Dermatologic History: skin cancer





- Infectious Disease History


Infectious Disease History: Reports: Chicken Pox, Measles, Mumps





- Past Surgical History


Cardiovascular Surgical History: Reports: Valve Replacement, Other (See Below)


Other Cardiovascular Surgeries/Procedures: 2 open heart surgeries


GI Surgical History: Reports: Appendectomy, Cholecystectomy, Colonoscopy, EGD, 

Hernia Repair/Other


Male  Surgical History: Reports: None





Social & Family History





- Family History


Cardiac: Reports: CAD, MI


: Reports: Renal Disease/Insufficiency


Oncologic: Reports: Brain





- Tobacco Use


Tobacco Use Status *Q: Never Tobacco User





- Caffeine Use


Caffeine Use: Reports: Coffee


Caffeine Use Comment: rarely





- Recreational Drug Use


Recreational Drug Use: No





ED ROS GENERAL





- Review of Systems


Review Of Systems: See Below


Constitutional: Reports: Malaise.  Denies: Fever, Chills


HEENT: Reports: No Symptoms


Respiratory: Denies: Shortness of Breath


Cardiovascular: Reports: Chest Pain.  Denies: Palpitations


GI/Abdominal: Reports: Abdominal Pain, Diarrhea.  Denies: Vomiting


Skin: Reports: Erythema (Chronic erythema of the lower extremities, dry skin)


Neurological: Denies: Confusion, Headache


Psychiatric: Reports: No Symptoms





ED EXAM, GENERAL





- Physical Exam


Exam: See Below


Exam Limited By: No Limitations


General Appearance: Alert, No Apparent Distress


Head: Atraumatic


Respiratory/Chest: Lungs Clear, Other (No chest wall tenderness to palpation)


Cardiovascular: Regular Rate, Rhythm, Extra Beats (Occasional ectopic beats are 

heard)


GI/Abdominal: Normal Bowel Sounds, Tender (Does react with tenderness to 

palpation of the right upper quadrant and left lower quadrant of the abdomen but

 no guarding or significant rebound tenderness.  No significant distention.)


Neurological: Alert, Oriented


Psychiatric: Flat Affect


Skin Exam: Warm, Dry, Other (Chronic vasculitic changes are present in the lower

 extremities from chronic edema including the erythema and dry scaly skin.)


  ** #1 Interpretation


EKG Date: 04/08/21


Time: 07:00


Rhythm: NSR


P-Wave: Present


Comparison: No Change


EKG Interpretation Comments: 





Right bundle branch block and borderline first-degree block are present, EKG is 

identical to 8 months ago.





Course





- Vital Signs


Last Recorded V/S: 


                                Last Vital Signs











Temp  97.9 F   04/08/21 06:52


 


Pulse  61   04/08/21 07:22


 


Resp  12   04/08/21 07:22


 


BP  124/51 L  04/08/21 07:22


 


Pulse Ox  99   04/08/21 07:22














- Orders/Labs/Meds


Orders: 


                               Active Orders 24 hr











 Category Date Time Status


 


 EKG 12 Lead [EK] Routine Ther  04/08/21 06:48 Ordered











Labs: 


                                Laboratory Tests











  04/08/21 04/08/21 04/08/21 Range/Units





  07:09 07:09 07:09 


 


WBC  4.8    (4.5-11.0)  K/uL


 


RBC  3.81 L    (4.30-5.90)  M/uL


 


Hgb  10.9 L    (12.0-15.0)  g/dL


 


Hct  34.0 L    (40.0-54.0)  %


 


MCV  89    (80-98)  fL


 


MCH  29    (27-31)  pg


 


MCHC  32    (32-36)  %


 


Plt Count  182    (150-400)  K/uL


 


Neut % (Auto)  69 H    (36-66)  %


 


Lymph % (Auto)  14 L    (24-44)  %


 


Mono % (Auto)  17 H    (2-6)  %


 


Eos % (Auto)  0 L    (2-4)  %


 


Baso % (Auto)  0    (0-1)  %


 


PT   11.4   (9.5-12.0)  sec


 


INR   1.05   (0.80-1.20)  


 


APTT   30.8   (27.0-36.0)  sec


 


Sodium    135 L  (140-148)  mmol/L


 


Potassium    3.8  (3.6-5.2)  mmol/L


 


Chloride    98 L  (100-108)  mmol/L


 


Carbon Dioxide    28  (21-32)  mmol/L


 


Anion Gap    12.8  (5.0-14.0)  mmol/L


 


BUN    17  D  (7-18)  mg/dL


 


Creatinine    1.3  (0.8-1.3)  mg/dL


 


Est Cr Clr Drug Dosing    34.02  mL/min


 


Estimated GFR (MDRD)    52 L  (>60)  


 


Glucose    114 H  ()  mg/dL


 


Lactic Acid     (0.4-2.0)  mmol/L


 


Calcium    9.2  (8.5-10.1)  mg/dL


 


Total Bilirubin    0.7  (0.2-1.0)  mg/dL


 


AST    20  (15-37)  U/L


 


ALT    16  (12-78)  U/L


 


Alkaline Phosphatase    73  ()  U/L


 


Troponin I    < 0.017  (0.000-0.056)  ng/mL


 


Total Protein    7.1  (6.4-8.2)  g/dL


 


Albumin    3.7  (3.4-5.0)  g/dL


 


Globulin    3.4  (2.3-3.5)  g/dL


 


Albumin/Globulin Ratio    1.1 L  (1.2-2.2)  


 


Lipase     ()  U/L














  04/08/21 04/08/21 Range/Units





  07:14 07:14 


 


WBC    (4.5-11.0)  K/uL


 


RBC    (4.30-5.90)  M/uL


 


Hgb    (12.0-15.0)  g/dL


 


Hct    (40.0-54.0)  %


 


MCV    (80-98)  fL


 


MCH    (27-31)  pg


 


MCHC    (32-36)  %


 


Plt Count    (150-400)  K/uL


 


Neut % (Auto)    (36-66)  %


 


Lymph % (Auto)    (24-44)  %


 


Mono % (Auto)    (2-6)  %


 


Eos % (Auto)    (2-4)  %


 


Baso % (Auto)    (0-1)  %


 


PT    (9.5-12.0)  sec


 


INR    (0.80-1.20)  


 


APTT    (27.0-36.0)  sec


 


Sodium    (140-148)  mmol/L


 


Potassium    (3.6-5.2)  mmol/L


 


Chloride    (100-108)  mmol/L


 


Carbon Dioxide    (21-32)  mmol/L


 


Anion Gap    (5.0-14.0)  mmol/L


 


BUN    (7-18)  mg/dL


 


Creatinine    (0.8-1.3)  mg/dL


 


Est Cr Clr Drug Dosing    mL/min


 


Estimated GFR (MDRD)    (>60)  


 


Glucose    ()  mg/dL


 


Lactic Acid  1.1   (0.4-2.0)  mmol/L


 


Calcium    (8.5-10.1)  mg/dL


 


Total Bilirubin    (0.2-1.0)  mg/dL


 


AST    (15-37)  U/L


 


ALT    (12-78)  U/L


 


Alkaline Phosphatase    ()  U/L


 


Troponin I    (0.000-0.056)  ng/mL


 


Total Protein    (6.4-8.2)  g/dL


 


Albumin    (3.4-5.0)  g/dL


 


Globulin    (2.3-3.5)  g/dL


 


Albumin/Globulin Ratio    (1.2-2.2)  


 


Lipase   102  ()  U/L














- Re-Assessments/Exams


Free Text/Narrative Re-Assessment/Exam: 





04/08/21 07:51


EKG done on arrival is identical to previous.  He has no chest symptoms on 

arrival, only has abdominal pain.  In review of his old records, he has a hist

ory of abdominal pain due to small bowel obstructions.  CBC, CMP, lipase, lactic

 acid and troponin were obtained.


04/08/21 08:47


Labs were remarkably normal, patient continued to rest quietly and appeared 

asymptomatic.  Lipase, lactic acid, white count were all normal.  Electrolytes 

normal.  CT the abdomen and pelvis without contrast was obtained to rule out 

evidence of bowel obstruction.


04/08/21 08:50





Impression:


Demonstration of multiple dilated gas and fluid-filled loops of central small 

bowel which could represent developing obstruction.





Incidental note of somewhat bulky soft tissue at the gastroesophageal junction 

which could represent a decompressed stomach, an underlying mass within the 

gastric lumen is not excluded and further evaluation with oral and IV contrast 

exam is recommended for improved characterization.








04/08/21 08:58


After being in the emergency room for 2 hours, patient had no emesis or 

increased pain.  CT results are above.  These are very similar findings to his 

last work-up and it resolved with conservative therapy.  Patient wants to try to

 go home, he is going to stick with just clear liquids for the next 24 hours and

 return if worsening.





Departure





- Departure


Time of Disposition: 10:16


Disposition: Home, Self-Care 01


Clinical Impression: 


 Partial small bowel obstruction





Abdominal pain


Qualifiers:


 Abdominal location: lower abdomen, unspecified Qualified Code(s): R10.30 - 

Lower abdominal pain, unspecified








- Discharge Information


Instructions:  Abdominal Pain, Adult


Referrals: 


PCP,None [Primary Care Provider] - 


Forms:  ED Department Discharge


Care Plan Goals: 


Stick with only liquids for the next 24 hours, then advance diet slowly as 

tolerated.  Return anytime if symptoms are worsening and persistent.  You are 

not having pain from heart problems, this is your abdomen and bowels that are 

causing your problem and may improve with just liquids.





Sepsis Event Note (ED)





- Evaluation


Sepsis Screening Result: No Definite Risk





- Focused Exam


Vital Signs: 


                                   Vital Signs











  Temp Pulse Resp BP Pulse Ox


 


 04/08/21 07:22   61  12  124/51 L  99


 


 04/08/21 06:52  97.9 F  72  20  132/54 L  97

## 2021-04-09 ENCOUNTER — HOSPITAL ENCOUNTER (INPATIENT)
Dept: HOSPITAL 11 - JP.ED | Age: 86
LOS: 4 days | Discharge: HOME | DRG: 177 | End: 2021-04-13
Attending: INTERNAL MEDICINE | Admitting: INTERNAL MEDICINE
Payer: MEDICARE

## 2021-04-09 DIAGNOSIS — I48.91: Primary | ICD-10-CM

## 2021-04-09 DIAGNOSIS — I48.0: ICD-10-CM

## 2021-04-09 DIAGNOSIS — H54.7: ICD-10-CM

## 2021-04-09 DIAGNOSIS — U07.1: ICD-10-CM

## 2021-04-09 DIAGNOSIS — I10: ICD-10-CM

## 2021-04-09 DIAGNOSIS — Z79.82: ICD-10-CM

## 2021-04-09 DIAGNOSIS — Z95.5: ICD-10-CM

## 2021-04-09 DIAGNOSIS — K21.9: ICD-10-CM

## 2021-04-09 DIAGNOSIS — Z95.1: ICD-10-CM

## 2021-04-09 DIAGNOSIS — G89.29: ICD-10-CM

## 2021-04-09 DIAGNOSIS — R19.7: ICD-10-CM

## 2021-04-09 DIAGNOSIS — K57.90: ICD-10-CM

## 2021-04-09 DIAGNOSIS — R79.1: ICD-10-CM

## 2021-04-09 DIAGNOSIS — Z85.828: ICD-10-CM

## 2021-04-09 DIAGNOSIS — Z79.899: ICD-10-CM

## 2021-04-09 DIAGNOSIS — M19.90: ICD-10-CM

## 2021-04-09 DIAGNOSIS — E86.0: ICD-10-CM

## 2021-04-09 DIAGNOSIS — I35.0: ICD-10-CM

## 2021-04-09 DIAGNOSIS — M54.9: ICD-10-CM

## 2021-04-09 DIAGNOSIS — I49.3: ICD-10-CM

## 2021-04-09 DIAGNOSIS — J12.82: ICD-10-CM

## 2021-04-09 DIAGNOSIS — Z87.891: ICD-10-CM

## 2021-04-09 DIAGNOSIS — F32.9: ICD-10-CM

## 2021-04-09 DIAGNOSIS — Z66: ICD-10-CM

## 2021-04-09 DIAGNOSIS — N18.30: ICD-10-CM

## 2021-04-09 DIAGNOSIS — F41.9: ICD-10-CM

## 2021-04-09 DIAGNOSIS — I25.10: ICD-10-CM

## 2021-04-09 LAB — SARS-COV-2 RNA RESP QL NAA+PROBE: POSITIVE

## 2021-04-09 PROCEDURE — 86140 C-REACTIVE PROTEIN: CPT

## 2021-04-09 PROCEDURE — 80048 BASIC METABOLIC PNL TOTAL CA: CPT

## 2021-04-09 PROCEDURE — 96374 THER/PROPH/DIAG INJ IV PUSH: CPT

## 2021-04-09 PROCEDURE — 85027 COMPLETE CBC AUTOMATED: CPT

## 2021-04-09 PROCEDURE — 8E0ZXY6 ISOLATION: ICD-10-PCS | Performed by: INTERNAL MEDICINE

## 2021-04-09 PROCEDURE — 93005 ELECTROCARDIOGRAM TRACING: CPT

## 2021-04-09 PROCEDURE — 94762 N-INVAS EAR/PLS OXIMTRY CONT: CPT

## 2021-04-09 PROCEDURE — 97162 PT EVAL MOD COMPLEX 30 MIN: CPT

## 2021-04-09 PROCEDURE — 97530 THERAPEUTIC ACTIVITIES: CPT

## 2021-04-09 PROCEDURE — 96375 TX/PRO/DX INJ NEW DRUG ADDON: CPT

## 2021-04-09 PROCEDURE — 83605 ASSAY OF LACTIC ACID: CPT

## 2021-04-09 PROCEDURE — 84484 ASSAY OF TROPONIN QUANT: CPT

## 2021-04-09 PROCEDURE — 85379 FIBRIN DEGRADATION QUANT: CPT

## 2021-04-09 PROCEDURE — 71275 CT ANGIOGRAPHY CHEST: CPT

## 2021-04-09 PROCEDURE — 0241U: CPT

## 2021-04-09 PROCEDURE — 83735 ASSAY OF MAGNESIUM: CPT

## 2021-04-09 PROCEDURE — 80053 COMPREHEN METABOLIC PANEL: CPT

## 2021-04-09 PROCEDURE — 99285 EMERGENCY DEPT VISIT HI MDM: CPT

## 2021-04-09 PROCEDURE — 36415 COLL VENOUS BLD VENIPUNCTURE: CPT

## 2021-04-09 PROCEDURE — 85025 COMPLETE CBC W/AUTO DIFF WBC: CPT

## 2021-04-09 PROCEDURE — XW033H6 INTRODUCTION OF OTHER NEW TECHNOLOGY MONOCLONAL ANTIBODY INTO PERIPHERAL VEIN, PERCUTANEOUS APPROACH, NEW TECHNOLOGY GROUP 6: ICD-10-PCS | Performed by: INTERNAL MEDICINE

## 2021-04-09 RX ADMIN — HYDROCODONE BITARTRATE AND ACETAMINOPHEN PRN TAB: 10; 325 TABLET ORAL at 21:24

## 2021-04-09 RX ADMIN — DILTIAZEM HYDROCHLORIDE SCH MLS/HR: 100 INJECTION, POWDER, LYOPHILIZED, FOR SOLUTION INTRAVENOUS at 11:35

## 2021-04-09 RX ADMIN — DILTIAZEM HYDROCHLORIDE SCH MLS/HR: 100 INJECTION, POWDER, LYOPHILIZED, FOR SOLUTION INTRAVENOUS at 21:32

## 2021-04-09 RX ADMIN — Medication SCH CAP: at 21:19

## 2021-04-09 NOTE — PCM.HP.2
H&P History of Present Illness





- General


Date of Service: 04/09/21


Admit Problem/Dx: 


                           Admission Diagnosis/Problem





Admission Diagnosis/Problem      Pneumonia








Source of Information: Patient, Provider


History Limitations: Reports: No Limitations





- History of Present Illness


Initial Comments - Free Text/Narative: 





CC: I collapsed this morning 





HPI: Al presents to the emergency room after an episode of syncope this morning.

 He reports several days of diarrhea and poor intake and thinks that he is 

getting dehydrated because of the diarrhea.  He felt a little dizzy before he 

collapsed on the floor.  He feels a little short of breath and has been 

coughing.  He had some chest pain yesterday but none today.  He reports 

intermittent watery diarrhea that is somewhat urgent.  He does have some 

abdominal pain which is mild and crampy.  He has been taking his usual pain 

pills and they do seem to help some.  No obvious trigger to make the abdominal 

pain worse.  Mild nausea but no vomiting.  He does report a sore throat for the 

past 2 or 3 days which is slowly getting better and he is able to swallow some 

liquids now.  No fevers or chills.  He feels weak.  He has aches and pains in 

most of his joints but this is chronic and stable.  He was seen in the emergency

room yesterday but work-up was reassuring and he wanted to try it at home.





Work-up in the emergency room today revealed evidence for mild dehydration.  He 

was in atrial fibrillation with a rapid ventricular response and was started on 

a diltiazem infusion.  His D-dimer was elevated so a CT pulmonary angiogram was 

obtained.  There was no PE but it did show a left lower lobe 

pneumonia/pneumonitis.  Antibiotics were initiated and he will be admitted for 

further management.


  ** Abdominal


Pain Score (Numeric/FACES): 2





  ** Throat


Pain Score (Numeric/FACES): 10





- Related Data


Allergies/Adverse Reactions: 


                                    Allergies











Allergy/AdvReac Type Severity Reaction Status Date / Time


 


No Known Allergies Allergy   Verified 04/09/21 08:18











Home Medications: 


                                    Home Meds





Aspirin 81 mg PO DAILY 05/31/14 [History]


Lisinopril/Hydrochlorothiazide [Lisinopril-Hctz 20-12.5 mg Tab] 1 each PO BID 

05/31/14 [History]


Metoprolol Succinate [Toprol XL 50mg] 1 tab PO DAILY 05/31/14 [History]


Nitroglycerin 0.4 mg SL ASDIRECTED PRN 05/31/14 [History]


dilTIAZem HCL [Diltiazem 12Hr ER] 90 mg PO BID 05/31/14 [History]


Hydrocodone/Acetaminophen [Hydrocodon-Acetaminophn ] 2 tab PO Q8H PRN 

10/23/19 [History]


LORazepam 0.5 mg PO Q8H PRN 10/23/19 [History]


Tamsulosin [Flomax] 0.4 mg PO BEDTIME #30 cap.er 06/26/20 [Rx]


Citalopram [Citalopram HBr] 10 mg PO DAILY 04/08/21 [History]


Pantoprazole [ProTONIX***] 40 mg PO DAILY 04/08/21 [History]


Simvastatin 80 mg PO BEDTIME 04/08/21 [History]











Past Medical History


HEENT History: Reports: Impaired Vision


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


Gastrointestinal History: Reports: Diverticulosis, GERD, Other (See Below)


Other Gastrointestinal History: bowel obstruction


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


Musculoskeletal History: Reports: Arthritis, Back Pain, Chronic


Psychiatric History: Reports: Anxiety, Depression


Other Oncologic History: skin


Other Dermatologic History: skin cancer





- Infectious Disease History


Infectious Disease History: Reports: Chicken Pox, Measles, Mumps





- Past Surgical History


Cardiovascular Surgical History: Reports: Valve Replacement, Other (See Below)


Other Cardiovascular Surgeries/Procedures: 2 open heart surgeries


GI Surgical History: Reports: Appendectomy, Cholecystectomy, Colonoscopy, EGD, 

Hernia Repair/Other


Male  Surgical History: Reports: None





Social & Family History





- Family History


Cardiac: Reports: CAD, MI


: Reports: Renal Disease/Insufficiency


Oncologic: Reports: Brain





- Tobacco Use


Tobacco Use Status *Q: Former Tobacco User


Used Tobacco, but Quit: Yes


Month/Year Tobacco Last Used: 40 years ago





- Caffeine Use


Caffeine Use: Reports: Coffee


Caffeine Use Comment: rarely





- Alcohol Use


Alcohol Use History: No





- Recreational Drug Use


Recreational Drug Use: No





H&P Review of Systems





- Review of Systems:


Review Of Systems: See Below


Free Text/Narrative: 





A complete 12 point review of systems was obtained.  Pertinent positives and ne

gatives are noted in the history of present illness.  All other systems were 

reviewed and were negative except as noted.





Exam





- Exam


Exam: See Below





- Vital Signs


Vital Signs: 


                                Last Vital Signs











Temp  36.8 C   04/09/21 08:09


 


Pulse  63   04/09/21 12:11


 


Resp  24 H  04/09/21 12:11


 


BP  151/77 H  04/09/21 12:11


 


Pulse Ox  94 L  04/09/21 12:11











Weight: 68.039 kg





- Exam


Quality Assessment: No: Supplemental Oxygen


General: Alert, Oriented, Cooperative.  No: Mild Distress


HEENT: Conjunctiva Clear.  No: Mucosa Moist & Pink (dry), Scleral Icterus


Neck: Supple, Trachea Midline.  No: Lymphadenopathy


Lungs: Normal Respiratory Effort, Crackles (few left lung base)


Cardiovascular: Irregular Rhythm, Tachycardia


GI/Abdominal Exam: Normal Bowel Sounds, Soft, Distended (mild ), Tender (mild 

generalized )


Back Exam: Normal Inspection, Full Range of Motion


Extremities: Pedal Edema.  No: Increased Warmth


Skin: Warm, Dry, Other (dry flaking skin both lower legs from midshin distally )


Neuro Extensive - Mental Status: Alert, Oriented x3, Nl Response to Commands


Neuro Extensive - Motor, Sensory, Reflexes: No: Dysarthria, Abnormal Motor, 

Tremor


Psychiatric: Alert, Normal Affect





- Patient Data


Lab Results Last 24 hrs: 


                         Laboratory Results - last 24 hr











  04/09/21 04/09/21 04/09/21 Range/Units





  08:55 08:55 08:55 


 


WBC  7.1    (4.5-11.0)  K/uL


 


RBC  4.34    (4.30-5.90)  M/uL


 


Hgb  12.6    (12.0-15.0)  g/dL


 


Hct  37.7 L    (40.0-54.0)  %


 


MCV  87    (80-98)  fL


 


MCH  29    (27-31)  pg


 


MCHC  33    (32-36)  %


 


Plt Count  171    (150-400)  K/uL


 


Neut % (Auto)  81 H    (36-66)  %


 


Lymph % (Auto)  8 L    (24-44)  %


 


Mono % (Auto)  11 H    (2-6)  %


 


Eos % (Auto)  0 L    (2-4)  %


 


Baso % (Auto)  0    (0-1)  %


 


D-Dimer, Quantitative   955.96 H   (0.0-500.0)  ng/mL


 


Sodium    133 L  (140-148)  mmol/L


 


Potassium    3.7  (3.6-5.2)  mmol/L


 


Chloride    94 L  (100-108)  mmol/L


 


Carbon Dioxide    26  (21-32)  mmol/L


 


Anion Gap    16.7 H  (5.0-14.0)  mmol/L


 


BUN    19 H  (7-18)  mg/dL


 


Creatinine    1.3  (0.8-1.3)  mg/dL


 


Est Cr Clr Drug Dosing    39.25  mL/min


 


Estimated GFR (MDRD)    52 L  (>60)  


 


Glucose    127 H  ()  mg/dL


 


Lactic Acid     (0.4-2.0)  mmol/L


 


Calcium    9.3  (8.5-10.1)  mg/dL


 


Total Bilirubin    0.8  (0.2-1.0)  mg/dL


 


AST    27  (15-37)  U/L


 


ALT    20  (12-78)  U/L


 


Alkaline Phosphatase    74  ()  U/L


 


Troponin I     (0.000-0.056)  ng/mL


 


Total Protein    7.6  (6.4-8.2)  g/dL


 


Albumin    3.8  (3.4-5.0)  g/dL


 


Globulin    3.8 H  (2.3-3.5)  g/dL


 


Albumin/Globulin Ratio    1.0 L  (1.2-2.2)  














  04/09/21 04/09/21 Range/Units





  08:55 08:55 


 


WBC    (4.5-11.0)  K/uL


 


RBC    (4.30-5.90)  M/uL


 


Hgb    (12.0-15.0)  g/dL


 


Hct    (40.0-54.0)  %


 


MCV    (80-98)  fL


 


MCH    (27-31)  pg


 


MCHC    (32-36)  %


 


Plt Count    (150-400)  K/uL


 


Neut % (Auto)    (36-66)  %


 


Lymph % (Auto)    (24-44)  %


 


Mono % (Auto)    (2-6)  %


 


Eos % (Auto)    (2-4)  %


 


Baso % (Auto)    (0-1)  %


 


D-Dimer, Quantitative    (0.0-500.0)  ng/mL


 


Sodium    (140-148)  mmol/L


 


Potassium    (3.6-5.2)  mmol/L


 


Chloride    (100-108)  mmol/L


 


Carbon Dioxide    (21-32)  mmol/L


 


Anion Gap    (5.0-14.0)  mmol/L


 


BUN    (7-18)  mg/dL


 


Creatinine    (0.8-1.3)  mg/dL


 


Est Cr Clr Drug Dosing    mL/min


 


Estimated GFR (MDRD)    (>60)  


 


Glucose    ()  mg/dL


 


Lactic Acid  1.6   (0.4-2.0)  mmol/L


 


Calcium    (8.5-10.1)  mg/dL


 


Total Bilirubin    (0.2-1.0)  mg/dL


 


AST    (15-37)  U/L


 


ALT    (12-78)  U/L


 


Alkaline Phosphatase    ()  U/L


 


Troponin I   0.051  (0.000-0.056)  ng/mL


 


Total Protein    (6.4-8.2)  g/dL


 


Albumin    (3.4-5.0)  g/dL


 


Globulin    (2.3-3.5)  g/dL


 


Albumin/Globulin Ratio    (1.2-2.2)  











Result Diagrams: 


                                 04/09/21 08:55





                                 04/09/21 08:55


Imaging Impressions Last 24 hrs: 


CT pulmonary angiogram-these images were personally reviewed-no evidence for 

pulmonary embolism.  He does have some groundglass opacity in the left lower 

lung consistent with pneumonitis/early pneumonia.


  ** #1 Interpretation


EKG Date: 04/09/21


Rhythm: A-Fib


Rate (Beats/Min): 120


Axis: RAD-Right Axis Deviation


P-Wave: Variable


QRS: RBBB


ST-T: Normal


QT: Normal


Comparison: Change From Previous EKG


EKG Interpretation Comments: 





I personally reviewed this EKG image 





Sepsis Event Note





- Evaluation


Sepsis Screening Result: No Definite Risk





- Focused Exam


Vital Signs: 


                                   Vital Signs











  Temp Pulse Resp BP Pulse Ox


 


 04/09/21 12:11   63  24 H  151/77 H  94 L


 


 04/09/21 11:14   123 H  25 H  137/78  95


 


 04/09/21 08:09  36.8 C  102 H  26 H  132/57 L  97














*Q Meaningful Use (ADM)





- VTE Risk Assess *Q


Each Risk Factor Represents 1 Point: Swollen Legs, Current, Serious lung disease

 including pneumonia


Total Score 1 Point Risk Factors: 2


Each Risk Factor Represents 2 Points: None


Total Score 2 Point Risk Factors: 0


Each Risk Factor Represents 3 Points: Age 75 Years or Greater


Total Score 3 Point Risk Factors: 3


Each Risk Factor Represents 5 Points: None


Total Score 5 Point Risk Factors: 0


Venous Thromboembolism Risk Factor Score *Q: 5





- Problem List


(1) Left lower lobe pneumonia


SNOMED Code(s): 311864825


   ICD Code: J18.9 - PNEUMONIA, UNSPECIFIED ORGANISM   Status: Acute   Current 

Visit: Yes   


Qualifiers: 


   Pneumonia type: due to unspecified organism   Qualified Code(s): J18.9 - 

Pneumonia, unspecified organism   





(2) Paroxysmal atrial fibrillation with rapid ventricular response


SNOMED Code(s): 5317545170


   ICD Code: I48.0 - PAROXYSMAL ATRIAL FIBRILLATION   Status: Acute   Current 

Visit: Yes   





(3) Diarrhea


SNOMED Code(s): 55697459


   ICD Code: R19.7 - DIARRHEA, UNSPECIFIED   Status: Acute   Current Visit: Yes 

  


Qualifiers: 


   Diarrhea type: presumed infectious   Qualified Code(s): R19.7 - Diarrhea, 

unspecified   





(4) CAD (coronary artery disease)


SNOMED Code(s): 05719622


   ICD Code: I25.10 - ATHSCL HEART DISEASE OF NATIVE CORONARY ARTERY W/O ANG 

PCTRS   Status: Chronic   Current Visit: No   


Qualifiers: 


   Coronary Disease-Associated Artery/Lesion type: native artery   Native vs. 

transplanted heart: native heart   Associated angina: without angina   Qualified

 Code(s): I25.10 - Atherosclerotic heart disease of native coronary artery 

without angina pectoris   





(5) Aortic stenosis


SNOMED Code(s): 43193147


   ICD Code: I35.0 - NONRHEUMATIC AORTIC (VALVE) STENOSIS   Status: Chronic   

Current Visit: No   


Qualifiers: 


   Cardiac valve disease etiology: etiology unspecified   Qualified Code(s): 

I35.0 - Nonrheumatic aortic (valve) stenosis   


Problem List Initiated/Reviewed/Updated: Yes


Orders Last 24hrs: 


                               Active Orders 24 hr











 Category Date Time Status


 


 Patient Status Manage Transfer [TRANSFER] Routine ADT  04/09/21 15:08 Ordered


 


 EKG Documentation Completion [RC] ASDIRECTED Care  04/09/21 08:39 Active


 


 Diltiazem [Cardizem] 100 mg Med  04/09/21 11:30 Active





 Sodium Chloride 0.9% [Normal Saline] 100 ml   





 IV TITRATE   


 


 Levofloxacin/Dextrose 5%-Water [Levaquin in D5W 750 MG/ Med  04/09/21 15:15 

Active





 150 ML] 750 mg   





 Premix Bag 1 bag   





 IV Q24H   


 


 Resuscitation Status Routine Resus Stat  04/09/21 15:11 Ordered


 


 EKG 12 Lead [EK] Stat Ther  04/09/21 08:39 Ordered








                                Medication Orders





Diltiazem HCl 100 mg/ Sodium (Chloride)  100 mls @ 5 mls/hr IV TITRATE REGINO; 

Protocol


   Last Titration: 04/09/21 13:31  Dose: 5 mg/hr, 5 mls/hr


   Documented by: PREILOR


   Admin: 04/09/21 11:35  Dose: 5 mg/hr, 5 mls/hr


   Documented by: PREILOR


Levofloxacin/Dextrose 750 mg/ (Premix)  150 mls @ 100 mls/hr IV Q24H REGINO








Assessment/Plan Comment:: 





ASSESSMENT AND PLAN - 





Left lower lobe pneumonia-curb 65 score is 2 suggesting 13% mortality.  Multiple

 chronic medical problems as well.  Not currently hypoxic but he is weak, in 

atrial fibrillation and obviously dyspneic in the room.  I suspect the diarrhea 

is probably related to the pneumonia since the symptoms started about the same 

time.


-Antibiotic coverage with levofloxacin


-Blood cultures if fever


-Symptomatic management of cough and diarrhea


-Gentle IV fluids overnight


-Supplement oxygen if needed





Paroxysmal atrial fibrillation with rapid ventricular response-rate has improved

 some with the diltiazem infusion.  Hopefully additional treatment for the 

pneumonia will improve his heart rate since this is likely the instigator for 

the rhythm.  He does have a history of heart disease but troponin is normal.


-Optimize electrolytes


-Diltiazem infusion


-Continue metoprolol


-Cardiac monitoring





Coronary artery disease-extensive history.  No chest pain today.  Troponin is 

normal.


-Continue medical management





Stage III chronic kidney disease-creatinine near baseline but slightly higher.  

Hopefully this will improve with some fluids and pneumonia treatment.





Maintenance issues - 


-DVT prophylaxis-enoxaparin


-GI prophylaxis-PPI


-Nutrition-low-sodium


-Pastor catheter-not indicated





CODE STATUS -DNR/DNI





Admission justification -this patient will be admitted for inpatient services 

and is medically appropriate meeting medical necessity for inpatient admission 

as outlined in my documentation.  I reasonably expect the patient will require 

inpatient services that span a period time over 2 midnights. I reasonably expect

 this patient to be discharged or transferred within 96 hours [facility]





Disposition -I anticipate discharge home after the hospital stay





Primary care physician -Dr. Jamaal Gimenez M.D.





- Mortality Measure


Prognosis:: Good

## 2021-04-09 NOTE — EDM.PDOC
ED HPI GENERAL MEDICAL PROBLEM





- General


Chief Complaint: Syncope


Stated Complaint: MEDICAL


Time Seen by Provider: 04/09/21 08:30


Source of Information: Reports: Patient, EMS, Old Records, RN Notes Reviewed


History Limitations: Reports: No Limitations





- History of Present Illness


INITIAL COMMENTS - FREE TEXT/NARRATIVE: 





86-year-old gentleman presents emergency department day complaint of syncopal 

event, he was in the emergency department yesterday he has known history of 

coronary artery disease as well as bowel obstructions chronic leg edema 

evaluation yesterday which did include a CT scan blood work EKG demonstrates 

sinus rhythm no acute process on CT scan no etiology could be found he was 

complaining of abdominal pain yesterday.  Today he states the diarrhea is making

him so weak that he passed out at home.  On presentation he is found to be in 

atrial fibrillation on the monitor complains of sore throat he is tachycardic 

and tachypneic.


  ** Abdominal


Pain Score (Numeric/FACES): 2





  ** Throat


Pain Score (Numeric/FACES): 10





- Related Data


                                    Allergies











Allergy/AdvReac Type Severity Reaction Status Date / Time


 


No Known Allergies Allergy   Verified 04/09/21 08:18











Home Meds: 


                                    Home Meds





Aspirin 81 mg PO DAILY 05/31/14 [History]


Lisinopril/Hydrochlorothiazide [Lisinopril-Hctz 20-12.5 mg Tab] 1 each PO BID 

05/31/14 [History]


Metoprolol Succinate [Toprol XL 50mg] 1 tab PO DAILY 05/31/14 [History]


Nitroglycerin 0.4 mg SL ASDIRECTED PRN 05/31/14 [History]


dilTIAZem HCL [Diltiazem 12Hr ER] 90 mg PO BID 05/31/14 [History]


Hydrocodone/Acetaminophen [Hydrocodon-Acetaminophn ] 2 tab PO Q8H PRN 

10/23/19 [History]


LORazepam 0.5 mg PO Q8H PRN 10/23/19 [History]


Tamsulosin [Flomax] 0.4 mg PO BEDTIME #30 cap.er 06/26/20 [Rx]


Citalopram [Citalopram HBr] 10 mg PO DAILY 04/08/21 [History]


Pantoprazole [ProTONIX***] 40 mg PO DAILY 04/08/21 [History]


Simvastatin 80 mg PO BEDTIME 04/08/21 [History]











Past Medical History


HEENT History: Reports: Impaired Vision


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


Gastrointestinal History: Reports: Diverticulosis, GERD, Other (See Below)


Other Gastrointestinal History: bowel obstruction


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


Musculoskeletal History: Reports: Arthritis, Back Pain, Chronic


Psychiatric History: Reports: Anxiety, Depression


Other Oncologic History: skin


Other Dermatologic History: skin cancer





- Infectious Disease History


Infectious Disease History: Reports: Chicken Pox, Measles, Mumps





- Past Surgical History


Cardiovascular Surgical History: Reports: Valve Replacement, Other (See Below)


Other Cardiovascular Surgeries/Procedures: 2 open heart surgeries


GI Surgical History: Reports: Appendectomy, Cholecystectomy, Colonoscopy, EGD, 

Hernia Repair/Other


Male  Surgical History: Reports: None





Social & Family History





- Family History


Cardiac: Reports: CAD, MI


: Reports: Renal Disease/Insufficiency


Oncologic: Reports: Brain





- Tobacco Use


Tobacco Use Status *Q: Former Tobacco User


Used Tobacco, but Quit: Yes


Month/Year Tobacco Last Used: 40 years ago





- Caffeine Use


Caffeine Use: Reports: Coffee


Caffeine Use Comment: rarely





- Recreational Drug Use


Recreational Drug Use: No





ED ROS GENERAL





- Review of Systems


Review Of Systems: See Below


Constitutional: Reports: Weakness


HEENT: Reports: Throat Pain, Throat Swelling


Respiratory: Reports: Shortness of Breath


Cardiovascular: Denies: Chest Pain


GI/Abdominal: Reports: No Symptoms





ED EXAM, GENERAL





- Physical Exam


Exam: See Below


Exam Limited By: No Limitations


General Appearance: Alert, No Apparent Distress


Throat/Mouth: Normal Inspection, Normal Lips, Normal Teeth, Normal Gums, Normal 

Oropharynx, Normal Voice, No Airway Compromise


Neck: Normal Inspection, Supple, Non-Tender, Full Range of Motion


Respiratory/Chest: No Respiratory Distress, Lungs Clear, Normal Breath Sounds, 

No Accessory Muscle Use, Chest Non-Tender


Cardiovascular: Systolic Murmur, Irregularly Irregular


GI/Abdominal: Normal Bowel Sounds, Soft, Non-Tender





Course





- Vital Signs


Last Recorded V/S: 


                                Last Vital Signs











Temp  98.3 F   04/09/21 08:09


 


Pulse  123 H  04/09/21 11:14


 


Resp  25 H  04/09/21 11:14


 


BP  137/78   04/09/21 11:14


 


Pulse Ox  95   04/09/21 11:14














- Orders/Labs/Meds


Orders: 


                               Active Orders 24 hr











 Category Date Time Status


 


 EKG Documentation Completion [RC] ASDIRECTED Care  04/09/21 08:39 Active


 


 Diltiazem 100 MG in Normal Saline Adv @ 5 MG/HR(100ml) Med  04/09/21 11:30 

Ordered





 Diltiazem [Cardizem] 100 mg   





 Sodium Chloride 0.9% [Normal Saline] 100 ml   





 IV TITRATE   


 


 EKG 12 Lead [EK] Stat Ther  04/09/21 08:39 Ordered








                                Medication Orders





Diltiazem HCl 100 mg/ Sodium (Chloride)  100 mls @ 5 mls/hr IV TITRATE REGINO; 

Protocol








Labs: 


                                Laboratory Tests











  04/09/21 04/09/21 04/09/21 Range/Units





  08:55 08:55 08:55 


 


WBC  7.1    (4.5-11.0)  K/uL


 


RBC  4.34    (4.30-5.90)  M/uL


 


Hgb  12.6    (12.0-15.0)  g/dL


 


Hct  37.7 L    (40.0-54.0)  %


 


MCV  87    (80-98)  fL


 


MCH  29    (27-31)  pg


 


MCHC  33    (32-36)  %


 


Plt Count  171    (150-400)  K/uL


 


Neut % (Auto)  81 H    (36-66)  %


 


Lymph % (Auto)  8 L    (24-44)  %


 


Mono % (Auto)  11 H    (2-6)  %


 


Eos % (Auto)  0 L    (2-4)  %


 


Baso % (Auto)  0    (0-1)  %


 


D-Dimer, Quantitative   955.96 H   (0.0-500.0)  ng/mL


 


Sodium    133 L  (140-148)  mmol/L


 


Potassium    3.7  (3.6-5.2)  mmol/L


 


Chloride    94 L  (100-108)  mmol/L


 


Carbon Dioxide    26  (21-32)  mmol/L


 


Anion Gap    16.7 H  (5.0-14.0)  mmol/L


 


BUN    19 H  (7-18)  mg/dL


 


Creatinine    1.3  (0.8-1.3)  mg/dL


 


Est Cr Clr Drug Dosing    39.25  mL/min


 


Estimated GFR (MDRD)    52 L  (>60)  


 


Glucose    127 H  ()  mg/dL


 


Lactic Acid     (0.4-2.0)  mmol/L


 


Calcium    9.3  (8.5-10.1)  mg/dL


 


Total Bilirubin    0.8  (0.2-1.0)  mg/dL


 


AST    27  (15-37)  U/L


 


ALT    20  (12-78)  U/L


 


Alkaline Phosphatase    74  ()  U/L


 


Troponin I     (0.000-0.056)  ng/mL


 


Total Protein    7.6  (6.4-8.2)  g/dL


 


Albumin    3.8  (3.4-5.0)  g/dL


 


Globulin    3.8 H  (2.3-3.5)  g/dL


 


Albumin/Globulin Ratio    1.0 L  (1.2-2.2)  














  04/09/21 04/09/21 Range/Units





  08:55 08:55 


 


WBC    (4.5-11.0)  K/uL


 


RBC    (4.30-5.90)  M/uL


 


Hgb    (12.0-15.0)  g/dL


 


Hct    (40.0-54.0)  %


 


MCV    (80-98)  fL


 


MCH    (27-31)  pg


 


MCHC    (32-36)  %


 


Plt Count    (150-400)  K/uL


 


Neut % (Auto)    (36-66)  %


 


Lymph % (Auto)    (24-44)  %


 


Mono % (Auto)    (2-6)  %


 


Eos % (Auto)    (2-4)  %


 


Baso % (Auto)    (0-1)  %


 


D-Dimer, Quantitative    (0.0-500.0)  ng/mL


 


Sodium    (140-148)  mmol/L


 


Potassium    (3.6-5.2)  mmol/L


 


Chloride    (100-108)  mmol/L


 


Carbon Dioxide    (21-32)  mmol/L


 


Anion Gap    (5.0-14.0)  mmol/L


 


BUN    (7-18)  mg/dL


 


Creatinine    (0.8-1.3)  mg/dL


 


Est Cr Clr Drug Dosing    mL/min


 


Estimated GFR (MDRD)    (>60)  


 


Glucose    ()  mg/dL


 


Lactic Acid  1.6   (0.4-2.0)  mmol/L


 


Calcium    (8.5-10.1)  mg/dL


 


Total Bilirubin    (0.2-1.0)  mg/dL


 


AST    (15-37)  U/L


 


ALT    (12-78)  U/L


 


Alkaline Phosphatase    ()  U/L


 


Troponin I   0.051  (0.000-0.056)  ng/mL


 


Total Protein    (6.4-8.2)  g/dL


 


Albumin    (3.4-5.0)  g/dL


 


Globulin    (2.3-3.5)  g/dL


 


Albumin/Globulin Ratio    (1.2-2.2)  











Meds: 


Medications











Generic Name Dose Route Start Last Admin





  Trade Name Freq  PRN Reason Stop Dose Admin


 


Diltiazem HCl 100 mg/ Sodium  100 mls @ 5 mls/hr  04/09/21 11:30 





  Chloride  IV  





  TITRATE REGINO  





  Protocol  





  5 MG/HR  














Discontinued Medications














Generic Name Dose Route Start Last Admin





  Trade Name Freq  PRN Reason Stop Dose Admin


 


Diltiazem HCl  15 mg  04/09/21 11:25 





  Diltiazem 25 Mg/5 Ml Sdv  IVPUSH  04/09/21 11:26 





  ONETIME ONE  


 


Sodium Chloride  100 mls @ 4 mls/sec  04/09/21 10:15  04/09/21 10:56





  Normal Saline  IV  04/09/21 10:16  4 mls/sec





  ASDIRECTED REGINO   Administration


 


Iopamidol  100 ml  04/09/21 10:15  04/09/21 10:57





  Iopamidol 755 Mg/Ml 100 Ml Bottle  IV  04/09/21 10:16  100 ml





  .AS DIRECTED REGINO   Administration


 


Sodium Chloride  10 ml  04/09/21 10:07  04/09/21 10:56





  Sodium Chloride 0.9% 10 Ml Syringe  FLUSH  04/09/21 10:08  10 ml





  ONETIME ONE   Administration














Departure





- Departure


Time of Disposition: 11:31


Disposition: Admitted As Inpatient 66


Condition: Poor


Clinical Impression: 


 New onset atrial fibrillation





Instructions:  Atrial Fibrillation


Referrals: 


PCP,None [Primary Care Provider] - 


Forms:  ED Department Discharge





Sepsis Event Note (ED)





- Evaluation


Sepsis Screening Result: No Definite Risk





- Focused Exam


Vital Signs: 


                                   Vital Signs











  Temp Pulse Resp BP Pulse Ox


 


 04/09/21 11:14   123 H  25 H  137/78  95


 


 04/09/21 08:09  98.3 F  102 H  26 H  132/57 L  97














- My Orders


Last 24 Hours: 


My Active Orders





04/09/21 08:39


EKG Documentation Completion [RC] ASDIRECTED 


EKG 12 Lead [EK] Stat 





04/09/21 11:30


Diltiazem 100 MG in Normal Saline Adv @ 5 MG/HR(100ml) Diltiazem [Cardizem] 100 

mg   Sodium Chloride 0.9% [Normal Saline] 100 ml IV TITRATE 














- Assessment/Plan


Last 24 Hours: 


My Active Orders





04/09/21 08:39


EKG Documentation Completion [RC] ASDIRECTED 


EKG 12 Lead [EK] Stat 





04/09/21 11:30


Diltiazem 100 MG in Normal Saline Adv @ 5 MG/HR(100ml) Diltiazem [Cardizem] 100 

mg   Sodium Chloride 0.9% [Normal Saline] 100 ml IV TITRATE 











Plan: 





Assessment





Acuity = acute





Site and laterality = new onset atrial fibrillation, weakness concern for 

pneumonia development left lower lobe





Etiology  = unknown





Manifestations = weakness, shortness of breath





Location of injury =  Home





Lab values = CBC unremarkable D-dimer elevated 955 uncertain significance sodium

low at 133 consistent with hyponatremia troponin in the normal range 0.051 CT 

scan describes no pulmonary embolism however there is a question of new 

development early pneumonia left lower lobe





Plan


Call discussed case hospitalist on-call at 1130 kindly agreed to come and 

evaluate patient emergency department for admission, Cardizem drip has been 

initiated in the emergency department

















 This note was dictated using dragon voice recognition software please call with

any questions on syntax or grammar.

## 2021-04-09 NOTE — CT
Ang Chest

 

CLINICAL HISTORY: Elevated d-dimer 

 

TECHNIQUE: Thin section axial contiguous tomographic sections were taken through

the chest after bolus IV iodinated contrast administration. Coronal and sagittal

images were reconstructed. Auto dosage reduction and iterative reconstruction

techniques employed.

 

FINDINGS: There is some reading motion which reduces detail in the lung bases.

There is some regional groundglass opacification in the left lower lobe. No

pulmonary masses identified. There are no pleural effusions. There is a 5 mm

some solid nodular focus with some surrounding micronodular nodularity in the

right upper lobe best seen on image #15. No mediastinal mass or suspicious

lymphadenopathy is identified. There are no pericardial effusions. There are

atherosclerotic changes in the aorta.

There are no filling defects in the pulmonary arteries. There is no vessel cut

off.

 

 

IMPRESSION: No evidence of pulmonary embolus

 

There is some groundglass opacification in the left lower lobe. This may

represent early pneumonitis

 

Subcentimeter nodular focus in the right upper lobe with some surrounding

nodularity is most likely related to some chronic infection

## 2021-04-10 RX ADMIN — HYDROCODONE BITARTRATE AND ACETAMINOPHEN PRN TAB: 10; 325 TABLET ORAL at 16:58

## 2021-04-10 RX ADMIN — Medication SCH CAP: at 08:16

## 2021-04-10 RX ADMIN — HYDROCODONE BITARTRATE AND ACETAMINOPHEN PRN TAB: 10; 325 TABLET ORAL at 22:30

## 2021-04-10 RX ADMIN — DILTIAZEM HYDROCHLORIDE SCH MG: 180 CAPSULE, COATED, EXTENDED RELEASE ORAL at 09:01

## 2021-04-10 RX ADMIN — Medication SCH CAP: at 20:25

## 2021-04-10 RX ADMIN — HYDROCODONE BITARTRATE AND ACETAMINOPHEN PRN TAB: 10; 325 TABLET ORAL at 04:15

## 2021-04-10 RX ADMIN — HYDROCODONE BITARTRATE AND ACETAMINOPHEN PRN TAB: 10; 325 TABLET ORAL at 09:48

## 2021-04-10 RX ADMIN — ASPIRIN SCH MG: 325 TABLET, DELAYED RELEASE ORAL at 08:19

## 2021-04-10 NOTE — PCM.PN
- General Info


Date of Service: 04/10/21


Subjective Update: 





No acute events overnight.  We have achieved return to sinus rhythm with the 

diltiazem infusion and this has been weaned down.  He remains weak and has not 

been out of bed as of yet.  He has not had any recurrence of his syncope.  Sore 

throat and diarrhea are a little better but not resolved.  He has not been 

hypoxic.  He has a mild cough and mild shortness of breath.  No chest pain.


Functional Status: Reports: Pain Controlled, Tolerating Diet





- Review of Systems


General: Reports: Weakness, Fatigue.  Denies: Fever


HEENT: Reports: Sore Throat


Pulmonary: Reports: Cough


Gastrointestinal: Reports: Diarrhea





- Patient Data


Vitals - Most Recent: 


                                Last Vital Signs











Temp  36.7 C   04/10/21 08:00


 


Pulse  73   04/10/21 09:00


 


Resp  18   04/10/21 09:00


 


BP  130/52 L  04/10/21 09:01


 


Pulse Ox  97   04/10/21 09:00











Weight - Most Recent: 56.109 kg


I&O - Last 24 Hours: 


                                 Intake & Output











 04/09/21 04/10/21 04/10/21





 22:59 06:59 14:59


 


Intake Total 1314 274 


 


Output Total 550 1050 


 


Balance 764 -776 











Lab Results Last 24 Hours: 


                         Laboratory Results - last 24 hr











  04/09/21 04/09/21 04/10/21 Range/Units





  08:55 15:40 05:30 


 


WBC    6.4  (4.5-11.0)  K/uL


 


RBC    4.13 L  (4.30-5.90)  M/uL


 


Hgb    11.7 L  (12.0-15.0)  g/dL


 


Hct    35.9 L  (40.0-54.0)  %


 


MCV    87  (80-98)  fL


 


MCH    28  (27-31)  pg


 


MCHC    33  (32-36)  %


 


Plt Count    161  (150-400)  K/uL


 


D-Dimer, Quantitative  955.96 H    (0.0-500.0)  ng/mL


 


Sodium     (140-148)  mmol/L


 


Potassium     (3.6-5.2)  mmol/L


 


Chloride     (100-108)  mmol/L


 


Carbon Dioxide     (21-32)  mmol/L


 


Anion Gap     (5.0-14.0)  mmol/L


 


BUN     (7-18)  mg/dL


 


Creatinine     (0.8-1.3)  mg/dL


 


Est Cr Clr Drug Dosing     mL/min


 


Estimated GFR (MDRD)     (>60)  


 


Glucose     ()  mg/dL


 


Calcium     (8.5-10.1)  mg/dL


 


Magnesium     (1.8-2.4)  mg/dL


 


Influenza Type A RNA   Negative   (NEGATIVE)  


 


RSV RNA (INAAT)   Negative   (NEGATIVE)  


 


Influenza Type B RNA   Negative   (NEGATIVE)  


 


SARS-CoV-2 RNA (GINETTE)   Positive H   (NEGATIVE)  














  04/10/21 Range/Units





  05:30 


 


WBC   (4.5-11.0)  K/uL


 


RBC   (4.30-5.90)  M/uL


 


Hgb   (12.0-15.0)  g/dL


 


Hct   (40.0-54.0)  %


 


MCV   (80-98)  fL


 


MCH   (27-31)  pg


 


MCHC   (32-36)  %


 


Plt Count   (150-400)  K/uL


 


D-Dimer, Quantitative   (0.0-500.0)  ng/mL


 


Sodium  137 L  (140-148)  mmol/L


 


Potassium  3.4 L  (3.6-5.2)  mmol/L


 


Chloride  99 L  (100-108)  mmol/L


 


Carbon Dioxide  28  (21-32)  mmol/L


 


Anion Gap  13.4  (5.0-14.0)  mmol/L


 


BUN  19 H  (7-18)  mg/dL


 


Creatinine  1.1  (0.8-1.3)  mg/dL


 


Est Cr Clr Drug Dosing  38.26  mL/min


 


Estimated GFR (MDRD)  > 60  (>60)  


 


Glucose  122 H  ()  mg/dL


 


Calcium  8.7  (8.5-10.1)  mg/dL


 


Magnesium  1.6 L  (1.8-2.4)  mg/dL


 


Influenza Type A RNA   (NEGATIVE)  


 


RSV RNA (INAAT)   (NEGATIVE)  


 


Influenza Type B RNA   (NEGATIVE)  


 


SARS-CoV-2 RNA (GINETTE)   (NEGATIVE)  











Med Orders - Current: 


                               Current Medications





Acetaminophen (Acetaminophen 325 Mg Tab)  650 mg PO ONETIME PRN


   PRN Reason: HEADACHE,CHILLS


Hydrocodone Bitart/Acetaminophen (Acetaminophen/Hydrocodone 325-10 Mg Tab)  2 

tab PO Q6H PRN


   PRN Reason: Pain


   Last Admin: 04/10/21 04:15 Dose:  2 tab


   Documented by: 


Albuterol (Albuterol 0.083% 2.5 Mg/3 Ml Neb Soln)  2.5 mg NEB Q4H PRN


   PRN Reason: Shortness Of Breath/wheezing


Aspirin (Aspirin 325 Mg Tab.Ec)  325 mg PO DAILY [facility]


   Last Admin: 04/10/21 08:19 Dose:  325 mg


   Documented by: 


Benzocaine/Menthol (Benzocaine/Cetylpyridinium/Menthol Lozenge)  1 lozenge 

MUCMEM Q2H PRN


   PRN Reason: Sore Throat


Benzonatate (Benzonatate 100 Mg Cap)  100 mg PO TID PRN


   PRN Reason: Cough


Citalopram Hydrobromide (Citalopram 10 Mg Tab)  10 mg PO DAILY [facility]


   Last Admin: 04/10/21 08:16 Dose:  10 mg


   Documented by: 


Diltiazem HCl (Diltiazem 180 Mg Cap.Cd)  180 mg PO DAILY [facility]


   Last Admin: 04/10/21 09:01 Dose:  180 mg


   Documented by: 


Diphenhydramine HCl (Diphenhydramine 50 Mg/Ml Sdv)  50 mg IVPUSH ONETIME PRN


   PRN Reason: ALLERGIC RXN


   Stop: 04/10/21 16:00


Enoxaparin Sodium (Enoxaparin 40 Mg/0.4 Ml Syringe)  40 mg SUBCUT Q24H [facility]


   Last Admin: 04/09/21 16:12 Dose:  40 mg


   Documented by: 


Epinephrine HCl (Epinephrine 1 Mg/Ml Sdv)  0.3 mg IM ONETIME PRN


   PRN Reason: ALLERGIC RXN


   Stop: 04/10/21 16:00


Famotidine (Famotidine 20 Mg/2 Ml Sdv)  20 mg IV ONETIME PRN


   PRN Reason: ALLERGIC RXN


   Stop: 04/10/21 16:00


Guaifenesin/Dextromethorphan (Guaifenesin/Dextromethorphan 100-10 Mg/5 Ml Soln 

10 Ml Cup)  10 ml PO Q4H PRN


   PRN Reason: Cough


Hydrochlorothiazide (Hydrochlorothiazide 12.5 Mg Cap)  12.5 mg PO BID [facility]


   Last Admin: 04/10/21 09:02 Dose:  12.5 mg


   Documented by: 


Sodium Chloride (Normal Saline)  1,000 mls @ 25 mls/hr IV ASDIRECTED [facility]


Non-Formulary Medication 1,200 mg/ Non-Formulary Medication 1,200 mg/ Sodium 

Chloride  170 mls @ 310 mls/hr IV ONETIME ONE


   Stop: 04/10/21 10:02


Lactobacillus Rhamnosus (Lactobacillus Rhamnosus Gg (Probiotic) Cap)  1 cap PO 

BID [facility]


   Last Admin: 04/10/21 08:16 Dose:  1 cap


   Documented by: 


Lidocaine HCl (Lidocaine 2% Viscous Solution 15 Ml Cup)  15 ml PO Q4H PRN


   PRN Reason: Sore Throat


Lisinopril (Lisinopril 20 Mg Tab)  20 mg PO BID [facility]


   Last Admin: 04/10/21 09:01 Dose:  20 mg


   Documented by: 


Lorazepam (Lorazepam 0.5 Mg Tab)  0.5 mg PO Q8H PRN


   PRN Reason: Anxiety


   Last Admin: 04/09/21 21:25 Dose:  0.5 mg


   Documented by: 


Lorazepam (Lorazepam 2 Mg/Ml Sdv)  0.5 mg IVPUSH Q4H PRN


   PRN Reason: Nausea/Vomiting


Magnesium Hydroxide (Magnesium Hydroxide 400 Mg/5 Ml Susp 30 Ml Cup)  30 ml PO 

Q12H PRN


   PRN Reason: Constipation


Magnesium Oxide (Magnesium Oxide 400 Mg Tab)  400 mg PO BID [facility]


   Last Admin: 04/10/21 08:21 Dose:  400 mg


   Documented by: 


Melatonin (Melatonin 3 Mg Tab)  9 mg PO BEDTIME [facility]


   Last Admin: 04/09/21 21:24 Dose:  9 mg


   Documented by: 


Methylprednisolone Sodium Succinate (Methylprednisolone Sodium Succinate 125 

Mg/2 Ml Sdv)  125 mg IVPUSH ONETIME PRN


   PRN Reason: ALLERGIC RXN


   Stop: 04/10/21 16:00


Metoprolol Succinate (Metoprolol Succinate 50 Mg Tab.Er)  50 mg PO DAILY [facility]


   Last Admin: 04/10/21 08:17 Dose:  50 mg


   Documented by: 


Morphine Sulfate (Morphine 2 Mg/Ml Syringe)  2 mg IVPUSH Q2H PRN


   PRN Reason: Pain (severe 7-10)


   Last Admin: 04/09/21 18:54 Dose:  2 mg


   Documented by: 


Ondansetron HCl (Ondansetron 4 Mg/2 Ml Sdv)  4 mg IV Q6H PRN


   PRN Reason: Nausea/Vomiting


Ondansetron HCl (Ondansetron 4 Mg Tab.Dis)  4 mg PO Q6H PRN


   PRN Reason: Nausea able to take PO


Pantoprazole Sodium (Pantoprazole 40 Mg Tab.Cr)  40 mg PO ACBREAKFAST [facility]


   Last Admin: 04/10/21 07:50 Dose:  40 mg


   Documented by: 


Senna/Docusate Sodium (Docusate Sodium/Sennosides 50-8.6 Mg Tab)  1 tab PO BID 

PRN


   PRN Reason: Constipation


Simvastatin (Simvastatin 20 Mg Tab)  80 mg PO BEDTIME [facility]


   Last Admin: 04/09/21 21:18 Dose:  80 mg


   Documented by: 


Tamsulosin HCl (Tamsulosin 0.4 Mg Cap.Er)  0.4 mg PO BEDTIME [facility]


   Last Admin: 04/09/21 21:25 Dose:  0.4 mg


   Documented by: 





Discontinued Medications





Hydrocodone Bitart/Acetaminophen (Acetaminophen/Hydrocodone 325-10 Mg Tab)  2 

tab PO ONETIME ONE


   Stop: 04/09/21 13:18


   Last Admin: 04/09/21 13:26 Dose:  2 tab


   Documented by: 


Bismuth Subsalicylate (Bismuth Subsalicylate 262 Mg Tab.Chew)  524 mg PO ONETIME

ONE


   Stop: 04/09/21 15:46


   Last Admin: 04/09/21 16:10 Dose:  524 mg


   Documented by: 


Diltiazem HCl (Diltiazem 25 Mg/5 Ml Sdv)  15 mg IVPUSH ONETIME ONE


   Stop: 04/09/21 11:26


   Last Admin: 04/09/21 11:33 Dose:  15 mg


   Documented by: 


Sodium Chloride (Normal Saline)  100 mls @ 4 mls/sec IV ASDIRECTED [facility]


   Stop: 04/09/21 10:16


   Last Admin: 04/09/21 10:56 Dose:  4 mls/sec


   Documented by: 


Diltiazem HCl 100 mg/ Sodium (Chloride)  100 mls @ 5 mls/hr IV TITRATE [facility]; 

Protocol


   Last Titration: 04/10/21 01:15 Dose:  2.5 mg/hr, 2.5 mls/hr


   Documented by: 


Sodium Chloride (Normal Saline)  1,000 mls @ 100 mls/hr IV ASDIRECTED [facility]


   Last Admin: 04/09/21 16:16 Dose:  100 mls/hr


   Documented by: 


Levofloxacin/Dextrose 750 mg/ (Premix)  150 mls @ 100 mls/hr IV Q24H [facility]


   Last Admin: 04/09/21 16:10 Dose:  100 mls/hr


   Documented by: 


Iopamidol (Iopamidol 755 Mg/Ml 100 Ml Bottle)  100 ml IV .AS DIRECTED [facility]


   Stop: 04/09/21 10:16


   Last Admin: 04/09/21 10:57 Dose:  100 ml


   Documented by: 


Potassium Chloride (Potassium Chloride 20 Meq Tab.Er)  40 meq PO ONETIME ONE


   Stop: 04/10/21 08:31


   Last Admin: 04/10/21 08:21 Dose:  40 meq


   Documented by: 


Sodium Chloride (Sodium Chloride 0.9% 10 Ml Syringe)  10 ml FLUSH ONETIME ONE


   Stop: 04/09/21 10:08


   Last Admin: 04/09/21 10:56 Dose:  10 ml


   Documented by: 











- Exam


Quality Assessment: No: Supplemental Oxygen


General: Alert, Oriented, Cooperative, No Acute Distress


Lungs: Normal Respiratory Effort, Crackles (rare left lung base)


Cardiovascular: Regular Rate, Regular Rhythm


GI/Abdominal Exam: Normal Bowel Sounds, Soft, No Distention


Extremities: Pedal Edema.  No: Increased Warmth


Skin: Warm, Dry


Psy/Mental Status: Alert, Normal Affect





- Patient Data


Lab Results Last 24 hrs: 


                         Laboratory Results - last 24 hr











  04/09/21 04/09/21 04/10/21 Range/Units





  08:55 15:40 05:30 


 


WBC    6.4  (4.5-11.0)  K/uL


 


RBC    4.13 L  (4.30-5.90)  M/uL


 


Hgb    11.7 L  (12.0-15.0)  g/dL


 


Hct    35.9 L  (40.0-54.0)  %


 


MCV    87  (80-98)  fL


 


MCH    28  (27-31)  pg


 


MCHC    33  (32-36)  %


 


Plt Count    161  (150-400)  K/uL


 


D-Dimer, Quantitative  955.96 H    (0.0-500.0)  ng/mL


 


Sodium     (140-148)  mmol/L


 


Potassium     (3.6-5.2)  mmol/L


 


Chloride     (100-108)  mmol/L


 


Carbon Dioxide     (21-32)  mmol/L


 


Anion Gap     (5.0-14.0)  mmol/L


 


BUN     (7-18)  mg/dL


 


Creatinine     (0.8-1.3)  mg/dL


 


Est Cr Clr Drug Dosing     mL/min


 


Estimated GFR (MDRD)     (>60)  


 


Glucose     ()  mg/dL


 


Calcium     (8.5-10.1)  mg/dL


 


Magnesium     (1.8-2.4)  mg/dL


 


Influenza Type A RNA   Negative   (NEGATIVE)  


 


RSV RNA (INAAT)   Negative   (NEGATIVE)  


 


Influenza Type B RNA   Negative   (NEGATIVE)  


 


SARS-CoV-2 RNA (GINETTE)   Positive H   (NEGATIVE)  














  04/10/21 Range/Units





  05:30 


 


WBC   (4.5-11.0)  K/uL


 


RBC   (4.30-5.90)  M/uL


 


Hgb   (12.0-15.0)  g/dL


 


Hct   (40.0-54.0)  %


 


MCV   (80-98)  fL


 


MCH   (27-31)  pg


 


MCHC   (32-36)  %


 


Plt Count   (150-400)  K/uL


 


D-Dimer, Quantitative   (0.0-500.0)  ng/mL


 


Sodium  137 L  (140-148)  mmol/L


 


Potassium  3.4 L  (3.6-5.2)  mmol/L


 


Chloride  99 L  (100-108)  mmol/L


 


Carbon Dioxide  28  (21-32)  mmol/L


 


Anion Gap  13.4  (5.0-14.0)  mmol/L


 


BUN  19 H  (7-18)  mg/dL


 


Creatinine  1.1  (0.8-1.3)  mg/dL


 


Est Cr Clr Drug Dosing  38.26  mL/min


 


Estimated GFR (MDRD)  > 60  (>60)  


 


Glucose  122 H  ()  mg/dL


 


Calcium  8.7  (8.5-10.1)  mg/dL


 


Magnesium  1.6 L  (1.8-2.4)  mg/dL


 


Influenza Type A RNA   (NEGATIVE)  


 


RSV RNA (INAAT)   (NEGATIVE)  


 


Influenza Type B RNA   (NEGATIVE)  


 


SARS-CoV-2 RNA (GINETTE)   (NEGATIVE)  











Result Diagrams: 


                                 04/10/21 05:30





                                 04/10/21 05:30





Sepsis Event Note





- Evaluation


Sepsis Screening Result: No Definite Risk





- Focused Exam


Vital Signs: 


                                   Vital Signs











  Temp Pulse Pulse Resp BP BP Pulse Ox


 


 04/10/21 09:01      130/52 L  


 


 04/10/21 09:00    73  18   116/57 L  97


 


 04/10/21 08:17   68    130/52 L  


 


 04/10/21 08:00  36.7 C   70  14   130/52 L  97


 


 04/10/21 07:00     16   124/41 L  96


 


 04/10/21 06:00     15   115/51 L  96


 


 04/10/21 05:00     15   112/50 L  95


 


 04/10/21 04:00  36.7 C    16   129/54 L  96


 


 04/10/21 03:00     19   130/52 L  96


 


 04/10/21 02:00     18   119/47 L  97


 


 04/10/21 01:00  36.2 C    16   118/50 L  97


 


 04/10/21 00:00     13   113/53 L  96


 


 04/09/21 23:00     13   114/52 L  95


 


 04/09/21 22:00     14   119/56 L  94 L














- Problem List & Annotations


(1) Left lower lobe pneumonia


SNOMED Code(s): 202240035


   Code(s): J18.9 - PNEUMONIA, UNSPECIFIED ORGANISM   Status: Acute   Current 

Visit: Yes   


Qualifiers: 


   Pneumonia type: due to unspecified organism   Qualified Code(s): J18.9 - 

Pneumonia, unspecified organism   





(2) Paroxysmal atrial fibrillation with rapid ventricular response


SNOMED Code(s): 8703147825


   Code(s): I48.0 - PAROXYSMAL ATRIAL FIBRILLATION   Status: Acute   Current 

Visit: Yes   





(3) Diarrhea


SNOMED Code(s): 61351060


   Code(s): R19.7 - DIARRHEA, UNSPECIFIED   Status: Acute   Current Visit: Yes  




Qualifiers: 


   Diarrhea type: presumed infectious   Qualified Code(s): R19.7 - Diarrhea, 

unspecified   





(4) CAD (coronary artery disease)


SNOMED Code(s): 46635069


   Code(s): I25.10 - ATHSCL HEART DISEASE OF NATIVE CORONARY ARTERY W/O ANG 

PCTRS   Status: Chronic   Current Visit: No   


Qualifiers: 


   Coronary Disease-Associated Artery/Lesion type: native artery   Native vs. 

transplanted heart: native heart   Associated angina: without angina   Qualified

Code(s): I25.10 - Atherosclerotic heart disease of native coronary artery 

without angina pectoris   





(5) Aortic stenosis


SNOMED Code(s): 24950177


   Code(s): I35.0 - NONRHEUMATIC AORTIC (VALVE) STENOSIS   Status: Chronic   

Current Visit: No   


Qualifiers: 


   Cardiac valve disease etiology: etiology unspecified   Qualified Code(s): 

I35.0 - Nonrheumatic aortic (valve) stenosis   





- Problem List Review


Problem List Initiated/Reviewed/Updated: Yes





- My Orders


Last 24 Hours: 


My Active Orders





04/09/21 15:11


Resuscitation Status Routine 





04/09/21 15:34


Acetaminophen/HYDROcodone [Norco 325-10 MG]   2 tab PO Q6H PRN 


Albuterol [Proventil Neb Soln]   2.5 mg NEB Q4H PRN 


Benzocaine/Cetylpyrd/Menthol [Cepacol Sore Throat]   1 lozenge MUCMEM Q2H PRN 


Benzonatate [Tessalon Perles]   100 mg PO TID PRN 


Dextromethorphan/guaiFENesin [Robitussin DM]   10 ml PO Q4H PRN 


Docusate Sodium/Sennosides [Senna Plus]   1 tab PO BID PRN 


LORazepam [Ativan]   0.5 mg IVPUSH Q4H PRN 


LORazepam [Ativan]   0.5 mg PO Q8H PRN 


Lidocaine 2% [Xylocaine 2% Viscous]   15 ml PO Q4H PRN 


Magnesium Hydroxide [Milk of Magnesia]   30 ml PO Q12H PRN 


Morphine   2 mg IVPUSH Q2H PRN 


Ondansetron [Zofran ODT]   4 mg PO Q6H PRN 


Ondansetron [Zofran]   4 mg IV Q6H PRN 





04/09/21 15:34


Patient Status [ADT] Routine 


Cardiac Monitoring [RC] Q6H 


Intake and Output [RC] QSHIFT 


Notify Provider Vital Signs [RC] ASDIRECTED 


Oxygen Therapy [RC] PRN 


RT Aerosol Therapy [RC] ASDIRECTED 


Up With Assistance [RC] ASDIRECTED 


VTE/DVT Education [RC] Per Unit Routine 


Vital Signs [RC] Q1H 





04/09/21 16:00


Enoxaparin [Lovenox]   40 mg SUBCUT Q24H 





04/09/21 Dinner


2 Gram Sodium Diet [DIET] 





04/09/21 18:15


Sodium Chloride 0.9% [Normal Saline] 1,000 ml IV ASDIRECTED 





04/09/21 21:00


Lactobacillus Rhamnosus GG [Culturelle]   1 cap PO BID 


Melatonin   9 mg PO BEDTIME 


Simvastatin [Zocor]   80 mg PO BEDTIME 


Tamsulosin [Flomax]   0.4 mg PO BEDTIME 


hydroCHLOROthiazide   12.5 mg PO BID 


lisinopriL [Prinivil]   20 mg PO BID 





04/10/21 07:00


PT Evaluation and Treatment [CONS] Routine 





04/10/21 07:30


Pantoprazole [ProTONIX***]   40 mg PO ACBREAKFAST 





04/10/21 08:00


Acetaminophen [TylenoL]   650 mg PO ONETIME PRN 


EPINEPHrine [Adrenalin]   0.3 mg IM ONETIME PRN 


Famotidine [Pepcid]   20 mg IV ONETIME PRN 


diphenhydrAMINE [Benadryl]   50 mg IVPUSH ONETIME PRN 


methylPREDNISolone Sod Succ [Solu-MEDROL]   125 mg IVPUSH ONETIME PRN 





04/10/21 09:00


Aspirin [Ecotrin]   325 mg PO DAILY 


Citalopram [Celexa]   10 mg PO DAILY 


Diltiazem [Cardizem CD]   180 mg PO DAILY 


Magnesium Oxide   400 mg PO BID 


Metoprolol Succinate [Toprol XL]   50 mg PO DAILY 





04/10/21 09:30


Casirivimab [Casirivimab (EUA)] 1,200 mg Imdevimab [Imdevimab (EUA)] 1,200 mg   

Sodium Chloride 0.9% [Normal Saline] 150 ml IV ONETIME 





04/11/21 05:00


C-REACTIVE PROTEIN [CHEM] Timed 


CBC W/O DIFF,HEMOGRAM [HEME] Timed (1) 


COMPREHENSIVE METABOLIC PN,CMP [CHEM] Timed 


DD [D-DIMER QUANTITATIVE] [COAG] Timed 














- Plan


Plan:: 





ASSESSMENT AND PLAN - 





Paroxysmal atrial fibrillation with rapid ventricular response-he is now back in

 sinus rhythm and diltiazem infusion has been weaned.  Planning transition to 

oral medications today.


-Optimize electrolytes


-Transition to oral diltiazem


-Continue metoprolol


-Continue cardiac monitoring





Positive COVID-19-symptoms include fatigue, weakness, cough, shortness of 

breath, sore throat and diarrhea.  Onset of symptoms was 4/6.  The patient is 

hospitalized but he was hospitalized for the atrial fibrillation, not for his 

Covid infection.  Initially we thought maybe he had a bacterial pneumonia.  

Given his advanced age, coronary artery disease and chronic kidney disease I 

believe he has had a very high risk for severe disease and death from the Covid 

infection.  We did discuss the potential risks and benefits of monoclonal 

antibody therapy.  I believe he is an appropriate candidate.  He is interested 

in receiving this therapy.


-Administer monoclonal antibodies per the protocol this morning


-Maintain negative fluid balance


-Repeat labs in the morning





Coronary artery disease-extensive history.  No chest pain overnight.  Vital 

signs stable.


-Continue medical management





Stage III chronic kidney disease-creatinine near baseline. 





Maintenance issues - 


-DVT prophylaxis-enoxaparin


-GI prophylaxis-PPI


-Nutrition-low-sodium





CODE STATUS -DNR/DNI





Disposition -I anticipate discharge home after the hospital stay





Ovidio Gimenez M.D.

## 2021-04-11 RX ADMIN — HYDROCODONE BITARTRATE AND ACETAMINOPHEN PRN TAB: 10; 325 TABLET ORAL at 17:10

## 2021-04-11 RX ADMIN — Medication SCH CAP: at 08:21

## 2021-04-11 RX ADMIN — HYDROCODONE BITARTRATE AND ACETAMINOPHEN PRN TAB: 10; 325 TABLET ORAL at 04:08

## 2021-04-11 RX ADMIN — ASPIRIN SCH MG: 325 TABLET, DELAYED RELEASE ORAL at 08:21

## 2021-04-11 RX ADMIN — HYDROCODONE BITARTRATE AND ACETAMINOPHEN PRN TAB: 10; 325 TABLET ORAL at 23:39

## 2021-04-11 RX ADMIN — DILTIAZEM HYDROCHLORIDE SCH MG: 180 CAPSULE, COATED, EXTENDED RELEASE ORAL at 08:20

## 2021-04-11 RX ADMIN — Medication SCH CAP: at 21:16

## 2021-04-11 NOTE — PCM.PN
- General Info


Date of Service: 04/11/21


Subjective Update: 





Overnight the patient did have difficulty with some confusion around 9:51 PM.  

He had an increased respiratory rate and an increased heart rate with more 

ectopy.  Once he settled back into bed his heart rate and oxygenation settled 

down and he required supplemental oxygen for only a short while.  He has not had

any fevers.  He does not feel short of breath but continues to cough.  Sore 

throat has improved but not quite resolved.  Diarrhea seems to have resolved.  

He tolerated the infusion of monoclonal antibodies well yesterday.  He still 

feels weak and tired but is feeling better.


Functional Status: Reports: Pain Controlled, Tolerating Diet





- Review of Systems


General: Reports: Weakness.  Denies: Fever


Gastrointestinal: Denies: Diarrhea





- Patient Data


Vitals - Most Recent: 


                                Last Vital Signs











Temp  36.9 C   04/11/21 08:00


 


Pulse  72   04/11/21 08:22


 


Resp  16   04/11/21 08:00


 


BP  110/53 L  04/11/21 08:26


 


Pulse Ox  97   04/11/21 08:00











Weight - Most Recent: 56.109 kg


I&O - Last 24 Hours: 


                                 Intake & Output











 04/10/21 04/11/21 04/11/21





 22:59 06:59 14:59


 


Intake Total 1190 600 


 


Output Total 925 200 


 


Balance 265 400 











Lab Results Last 24 Hours: 


                         Laboratory Results - last 24 hr











  04/11/21 04/11/21 04/11/21 Range/Units





  06:09 06:09 06:09 


 


WBC  5.6    (4.5-11.0)  K/uL


 


RBC  3.65 L    (4.30-5.90)  M/uL


 


Hgb  10.2 L    (12.0-15.0)  g/dL


 


Hct  32.0 L    (40.0-54.0)  %


 


MCV  88    (80-98)  fL


 


MCH  28    (27-31)  pg


 


MCHC  32    (32-36)  %


 


Plt Count  141 L    (150-400)  K/uL


 


D-Dimer, Quantitative   749.07 H   (0.0-500.0)  ng/mL


 


Sodium    136 L  (140-148)  mmol/L


 


Potassium    3.7  (3.6-5.2)  mmol/L


 


Chloride    99 L  (100-108)  mmol/L


 


Carbon Dioxide    28  (21-32)  mmol/L


 


Anion Gap    12.7  (5.0-14.0)  mmol/L


 


BUN    18  (7-18)  mg/dL


 


Creatinine    1.3  (0.8-1.3)  mg/dL


 


Est Cr Clr Drug Dosing    32.37  mL/min


 


Estimated GFR (MDRD)    52 L  (>60)  


 


Glucose    116 H  ()  mg/dL


 


Calcium    8.1 L  (8.5-10.1)  mg/dL


 


Total Bilirubin    0.5  (0.2-1.0)  mg/dL


 


AST    24  (15-37)  U/L


 


ALT    20  (12-78)  U/L


 


Alkaline Phosphatase    45 L  ()  U/L


 


C-Reactive Protein    10.89 H  (0.0-0.3)  mg/dL


 


Total Protein    5.7 L  (6.4-8.2)  g/dL


 


Albumin    2.5 L  (3.4-5.0)  g/dL


 


Globulin    3.2  (2.3-3.5)  g/dL


 


Albumin/Globulin Ratio    0.8 L  (1.2-2.2)  











Med Orders - Current: 


                               Current Medications





Acetaminophen (Acetaminophen 325 Mg Tab)  650 mg PO ONETIME PRN


   PRN Reason: HEADACHE,CHILLS


Hydrocodone Bitart/Acetaminophen (Acetaminophen/Hydrocodone 325-10 Mg Tab)  2 

tab PO Q6H PRN


   PRN Reason: Pain


   Last Admin: 04/11/21 04:08 Dose:  2 tab


   Documented by: 


Albuterol (Albuterol 0.083% 2.5 Mg/3 Ml Neb Soln)  2.5 mg NEB Q4H PRN


   PRN Reason: Shortness Of Breath/wheezing


Aspirin (Aspirin 325 Mg Tab.Ec)  325 mg PO DAILY [facility]


   Last Admin: 04/11/21 08:21 Dose:  325 mg


   Documented by: 


Benzocaine/Menthol (Benzocaine/Cetylpyridinium/Menthol Lozenge)  1 lozenge 

MUCMEM Q2H PRN


   PRN Reason: Sore Throat


Benzonatate (Benzonatate 100 Mg Cap)  100 mg PO TID PRN


   PRN Reason: Cough


Bismuth Subsalicylate (Bismuth Subsalicylate 262 Mg/15 Ml Susp 236 Ml Bottle)  

30 ml PO Q4H PRN


   PRN Reason: Dyspepsia


Citalopram Hydrobromide (Citalopram 10 Mg Tab)  10 mg PO DAILY [facility]


   Last Admin: 04/11/21 08:21 Dose:  10 mg


   Documented by: 


Diltiazem HCl (Diltiazem 180 Mg Cap.Cd)  180 mg PO DAILY [facility]


   Last Admin: 04/11/21 08:20 Dose:  180 mg


   Documented by: 


Enoxaparin Sodium (Enoxaparin 40 Mg/0.4 Ml Syringe)  40 mg SUBCUT Q24H [facility]


   Last Admin: 04/10/21 15:23 Dose:  40 mg


   Documented by: 


Guaifenesin/Dextromethorphan (Guaifenesin/Dextromethorphan 100-10 Mg/5 Ml Soln 

10 Ml Cup)  10 ml PO Q4H PRN


   PRN Reason: Cough


Hydrochlorothiazide (Hydrochlorothiazide 12.5 Mg Cap)  12.5 mg PO BID [facility]


   Last Admin: 04/11/21 08:25 Dose:  12.5 mg


   Documented by: 


Lactobacillus Rhamnosus (Lactobacillus Rhamnosus Gg (Probiotic) Cap)  1 cap PO 

BID [facility]


   Last Admin: 04/11/21 08:21 Dose:  1 cap


   Documented by: 


Lidocaine HCl (Lidocaine 2% Viscous Solution 15 Ml Cup)  15 ml PO Q4H PRN


   PRN Reason: Sore Throat


Lisinopril (Lisinopril 20 Mg Tab)  20 mg PO BID [facility]


   Last Admin: 04/11/21 08:26 Dose:  20 mg


   Documented by: 


Lorazepam (Lorazepam 0.5 Mg Tab)  0.5 mg PO Q8H PRN


   PRN Reason: Anxiety


   Last Admin: 04/10/21 20:24 Dose:  0.5 mg


   Documented by: 


Lorazepam (Lorazepam 2 Mg/Ml Sdv)  0.5 mg IVPUSH Q4H PRN


   PRN Reason: Nausea/Vomiting


Magnesium Hydroxide (Magnesium Hydroxide 400 Mg/5 Ml Susp 30 Ml Cup)  30 ml PO 

Q12H PRN


   PRN Reason: Constipation


Magnesium Oxide (Magnesium Oxide 400 Mg Tab)  400 mg PO BID [facility]


   Last Admin: 04/11/21 08:25 Dose:  400 mg


   Documented by: 


Melatonin (Melatonin 3 Mg Tab)  9 mg PO BEDTIME [facility]


   Last Admin: 04/10/21 20:25 Dose:  9 mg


   Documented by: 


Metoprolol Succinate (Metoprolol Succinate 50 Mg Tab.Er)  50 mg PO DAILY [facility]


   Last Admin: 04/11/21 08:22 Dose:  50 mg


   Documented by: 


Morphine Sulfate (Morphine 2 Mg/Ml Syringe)  2 mg IVPUSH Q2H PRN


   PRN Reason: Pain (severe 7-10)


   Last Admin: 04/09/21 18:54 Dose:  2 mg


   Documented by: 


Ondansetron HCl (Ondansetron 4 Mg/2 Ml Sdv)  4 mg IV Q6H PRN


   PRN Reason: Nausea/Vomiting


   Last Admin: 04/10/21 20:26 Dose:  4 mg


   Documented by: 


Ondansetron HCl (Ondansetron 4 Mg Tab.Dis)  4 mg PO Q6H PRN


   PRN Reason: Nausea able to take PO


Pantoprazole Sodium (Pantoprazole 40 Mg Tab.Cr)  40 mg PO ACBREAKFAST [facility]


   Last Admin: 04/11/21 08:00 Dose:  40 mg


   Documented by: 


Senna/Docusate Sodium (Docusate Sodium/Sennosides 50-8.6 Mg Tab)  1 tab PO BID 

PRN


   PRN Reason: Constipation


Simvastatin (Simvastatin 20 Mg Tab)  80 mg PO BEDTIME [facility]


   Last Admin: 04/10/21 20:24 Dose:  80 mg


   Documented by: 


Tamsulosin HCl (Tamsulosin 0.4 Mg Cap.Er)  0.4 mg PO BEDTIME [facility]


   Last Admin: 04/10/21 20:24 Dose:  0.4 mg


   Documented by: 





Discontinued Medications





Hydrocodone Bitart/Acetaminophen (Acetaminophen/Hydrocodone 325-10 Mg Tab)  2 

tab PO ONETIME ONE


   Stop: 04/09/21 13:18


   Last Admin: 04/09/21 13:26 Dose:  2 tab


   Documented by: 


Bismuth Subsalicylate (Bismuth Subsalicylate 262 Mg Tab.Chew)  524 mg PO ONETIME

ONE


   Stop: 04/09/21 15:46


   Last Admin: 04/09/21 16:10 Dose:  524 mg


   Documented by: 


Diltiazem HCl (Diltiazem 25 Mg/5 Ml Sdv)  15 mg IVPUSH ONETIME ONE


   Stop: 04/09/21 11:26


   Last Admin: 04/09/21 11:33 Dose:  15 mg


   Documented by: 


Diltiazem HCl (Diltiazem 25 Mg/5 Ml Sdv)  10 mg IVPUSH ONETIME ONE


   Stop: 04/10/21 21:46


   Last Admin: 04/10/21 22:29 Dose:  Not Given


   Documented by: 


Diltiazem HCl (Diltiazem 100 Mg Advvial) Confirm Administered Dose 100 mg .ROUTE

.STK-MED ONE


   Stop: 04/10/21 21:49


   Last Admin: 04/10/21 22:29 Dose:  Not Given


   Documented by: 


Diltiazem HCl (Diltiazem 25 Mg/5 Ml Sdv) Confirm Administered Dose 25 mg .ROUTE 

.STK-MED ONE


   Stop: 04/10/21 21:50


   Last Admin: 04/10/21 22:29 Dose:  Not Given


   Documented by: 


Diphenhydramine HCl (Diphenhydramine 50 Mg/Ml Sdv)  50 mg IVPUSH ONETIME PRN


   PRN Reason: ALLERGIC RXN


   Stop: 04/10/21 16:00


Epinephrine HCl (Epinephrine 1 Mg/Ml Sdv)  0.3 mg IM ONETIME PRN


   PRN Reason: ALLERGIC RXN


   Stop: 04/10/21 16:00


Famotidine (Famotidine 20 Mg/2 Ml Sdv)  20 mg IV ONETIME PRN


   PRN Reason: ALLERGIC RXN


   Stop: 04/10/21 16:00


Sodium Chloride (Normal Saline)  100 mls @ 4 mls/sec IV ASDIRECTED [facility]


   Stop: 04/09/21 10:16


   Last Admin: 04/09/21 10:56 Dose:  4 mls/sec


   Documented by: 


Diltiazem HCl 100 mg/ Sodium (Chloride)  100 mls @ 5 mls/hr IV TITRATE [facility]; 

Protocol


   Last Titration: 04/10/21 01:15 Dose:  2.5 mg/hr, 2.5 mls/hr


   Documented by: 


Sodium Chloride (Normal Saline)  1,000 mls @ 100 mls/hr IV ASDIRECTED [facility]


   Last Admin: 04/09/21 16:16 Dose:  100 mls/hr


   Documented by: 


Levofloxacin/Dextrose 750 mg/ (Premix)  150 mls @ 100 mls/hr IV Q24H [facility]


   Last Admin: 04/09/21 16:10 Dose:  100 mls/hr


   Documented by: 


Sodium Chloride (Normal Saline)  1,000 mls @ 25 mls/hr IV ASDIRECTED [facility]


Non-Formulary Medication 1,200 mg/ Non-Formulary Medication 1,200 mg/ Sodium 

Chloride  170 mls @ 310 mls/hr IV ONETIME ONE


   Stop: 04/10/21 10:02


   Last Admin: 04/10/21 09:40 Dose:  310 mls/hr


   Documented by: 


Diltiazem HCl 100 mg/ Sodium (Chloride)  100 mls @ 5 mls/hr IV TITRATE [facility]; 

Protocol


Sodium Chloride (Normal Saline) Confirm Administered Dose 100 mls @ as directed 

.ROUTE .STK-MED ONE


   Stop: 04/10/21 21:51


   Last Admin: 04/10/21 22:30 Dose:  Not Given


   Documented by: 


Iopamidol (Iopamidol 755 Mg/Ml 100 Ml Bottle)  100 ml IV .AS DIRECTED [facility]


   Stop: 04/09/21 10:16


   Last Admin: 04/09/21 10:57 Dose:  100 ml


   Documented by: 


Methylprednisolone Sodium Succinate (Methylprednisolone Sodium Succinate 125 

Mg/2 Ml Sdv)  125 mg IVPUSH ONETIME PRN


   PRN Reason: ALLERGIC RXN


   Stop: 04/10/21 16:00


Potassium Chloride (Potassium Chloride 20 Meq Tab.Er)  40 meq PO ONETIME ONE


   Stop: 04/10/21 08:31


   Last Admin: 04/10/21 08:21 Dose:  40 meq


   Documented by: 


Sodium Chloride (Sodium Chloride 0.9% 10 Ml Syringe)  10 ml FLUSH ONETIME ONE


   Stop: 04/09/21 10:08


   Last Admin: 04/09/21 10:56 Dose:  10 ml


   Documented by: 











- Exam


Quality Assessment: No: Supplemental Oxygen


General: Alert, Oriented, Cooperative, No Acute Distress


Lungs: Normal Respiratory Effort.  No: Wheezing


Cardiovascular: Regular Rate, Regular Rhythm


GI/Abdominal Exam: Soft, No Distention


Extremities: Pedal Edema.  No: Increased Warmth


Skin: Warm, Dry


Psy/Mental Status: Alert, Normal Affect





- Patient Data


Lab Results Last 24 hrs: 


                         Laboratory Results - last 24 hr











  04/11/21 04/11/21 04/11/21 Range/Units





  06:09 06:09 06:09 


 


WBC  5.6    (4.5-11.0)  K/uL


 


RBC  3.65 L    (4.30-5.90)  M/uL


 


Hgb  10.2 L    (12.0-15.0)  g/dL


 


Hct  32.0 L    (40.0-54.0)  %


 


MCV  88    (80-98)  fL


 


MCH  28    (27-31)  pg


 


MCHC  32    (32-36)  %


 


Plt Count  141 L    (150-400)  K/uL


 


D-Dimer, Quantitative   749.07 H   (0.0-500.0)  ng/mL


 


Sodium    136 L  (140-148)  mmol/L


 


Potassium    3.7  (3.6-5.2)  mmol/L


 


Chloride    99 L  (100-108)  mmol/L


 


Carbon Dioxide    28  (21-32)  mmol/L


 


Anion Gap    12.7  (5.0-14.0)  mmol/L


 


BUN    18  (7-18)  mg/dL


 


Creatinine    1.3  (0.8-1.3)  mg/dL


 


Est Cr Clr Drug Dosing    32.37  mL/min


 


Estimated GFR (MDRD)    52 L  (>60)  


 


Glucose    116 H  ()  mg/dL


 


Calcium    8.1 L  (8.5-10.1)  mg/dL


 


Total Bilirubin    0.5  (0.2-1.0)  mg/dL


 


AST    24  (15-37)  U/L


 


ALT    20  (12-78)  U/L


 


Alkaline Phosphatase    45 L  ()  U/L


 


C-Reactive Protein    10.89 H  (0.0-0.3)  mg/dL


 


Total Protein    5.7 L  (6.4-8.2)  g/dL


 


Albumin    2.5 L  (3.4-5.0)  g/dL


 


Globulin    3.2  (2.3-3.5)  g/dL


 


Albumin/Globulin Ratio    0.8 L  (1.2-2.2)  











Result Diagrams: 


                                 04/11/21 06:09





                                 04/11/21 06:09





Sepsis Event Note





- Evaluation


Sepsis Screening Result: No Definite Risk





- Focused Exam


Vital Signs: 


                                   Vital Signs











  Temp Pulse Pulse Resp BP BP Pulse Ox


 


 04/11/21 08:26      110/53 L  


 


 04/11/21 08:22   72    110/53 L  


 


 04/11/21 08:00  36.9 C   65  16   110/53 L  97


 


 04/11/21 06:00     14   98/45 L  97


 


 04/11/21 04:00     16   114/48 L  100


 


 04/11/21 02:00  36.7 C    15   114/60  97


 


 04/11/21 00:00  36.4 C    14   112/51 L  93 L


 


 04/10/21 22:00     22 H   142/97 H  97














- Problem List & Annotations


(1) Left lower lobe pneumonia


SNOMED Code(s): 445781752


   Code(s): J18.9 - PNEUMONIA, UNSPECIFIED ORGANISM   Status: Acute   Current 

Visit: Yes   


Qualifiers: 


   Pneumonia type: due to unspecified organism   Qualified Code(s): J18.9 - 

Pneumonia, unspecified organism   





(2) Paroxysmal atrial fibrillation with rapid ventricular response


SNOMED Code(s): 8836812500


   Code(s): I48.0 - PAROXYSMAL ATRIAL FIBRILLATION   Status: Acute   Current 

Visit: Yes   





(3) Diarrhea


SNOMED Code(s): 88135072


   Code(s): R19.7 - DIARRHEA, UNSPECIFIED   Status: Acute   Current Visit: Yes  




Qualifiers: 


   Diarrhea type: presumed infectious   Qualified Code(s): R19.7 - Diarrhea, 

unspecified   





(4) CAD (coronary artery disease)


SNOMED Code(s): 11096376


   Code(s): I25.10 - ATHSCL HEART DISEASE OF NATIVE CORONARY ARTERY W/O ANG 

PCTRS   Status: Chronic   Current Visit: No   


Qualifiers: 


   Coronary Disease-Associated Artery/Lesion type: native artery   Native vs. 

transplanted heart: native heart   Associated angina: without angina   Qualified

Code(s): I25.10 - Atherosclerotic heart disease of native coronary artery 

without angina pectoris   





(5) Aortic stenosis


SNOMED Code(s): 39650440


   Code(s): I35.0 - NONRHEUMATIC AORTIC (VALVE) STENOSIS   Status: Chronic   

Current Visit: No   


Qualifiers: 


   Cardiac valve disease etiology: etiology unspecified   Qualified Code(s): 

I35.0 - Nonrheumatic aortic (valve) stenosis   





- Problem List Review


Problem List Initiated/Reviewed/Updated: Yes





- My Orders


Last 24 Hours: 


My Active Orders





04/10/21 09:00


Aspirin [Ecotrin]   325 mg PO DAILY 


Citalopram [Celexa]   10 mg PO DAILY 


Diltiazem [Cardizem CD]   180 mg PO DAILY 


Magnesium Oxide   400 mg PO BID 


Metoprolol Succinate [Toprol XL]   50 mg PO DAILY 





04/10/21 22:03


Bismuth Subsalicylate [Pepto Bismol]   30 ml PO Q4H PRN 





04/11/21 09:26


Transfer Patient (Change bed) [ADT] Routine 





04/12/21 05:00


BASIC METABOLIC PANEL,BMP [CHEM] Timed 


C-REACTIVE PROTEIN [CHEM] Timed 


CBC W/O DIFF,HEMOGRAM [HEME] Timed (1) 


DD [D-DIMER QUANTITATIVE] [COAG] Timed 





04/12/21 07:00


PT Evaluation and Treatment [CONS] Routine 














- Plan


Plan:: 





ASSESSMENT AND PLAN - 





Paroxysmal atrial fibrillation with rapid ventricular response-he remains in 

sinus rhythm with some ectopy though much less so this morning.  He has done 

fairly well off of the diltiazem infusion which was stopped yesterday.


-Optimize electrolytes


-Continue oral diltiazem


-Continue metoprolol


-Continue cardiac monitoring





Positive COVID-19-symptoms include fatigue, weakness, cough, shortness of 

breath, sore throat and diarrhea.  Onset of symptoms was 4/6.  The patient is 

hospitalized but he was hospitalized for the atrial fibrillation, not for his 

Covid infection.  Given his advanced age, coronary artery disease and chronic 

kidney disease I believe he had a very high risk for severe disease and death 

from the Covid infection.  He tolerated the infusion of monoclonal antibodies 

well.  Seems to be improving.  Still weak but other symptoms are improving.


-Maintain isolation precautions


-Maintain negative fluid balance


-Repeat labs in the morning





Coronary artery disease-extensive history.  No chest pain during the admission.


-Continue medical management





Stage III chronic kidney disease-creatinine near baseline. 





Maintenance issues - 


-DVT prophylaxis-enoxaparin


-GI prophylaxis-PPI


-Nutrition-low-sodium





CODE STATUS -DNR/DNI





Disposition -I anticipate discharge home possibly with home care after the 

hospital stay





Ovidio Gimenez M.D.

## 2021-04-12 RX ADMIN — Medication SCH CAP: at 08:42

## 2021-04-12 RX ADMIN — ASPIRIN SCH MG: 325 TABLET, DELAYED RELEASE ORAL at 08:43

## 2021-04-12 RX ADMIN — HYDROCODONE BITARTRATE AND ACETAMINOPHEN PRN TAB: 10; 325 TABLET ORAL at 05:53

## 2021-04-12 RX ADMIN — DILTIAZEM HYDROCHLORIDE SCH MG: 180 CAPSULE, COATED, EXTENDED RELEASE ORAL at 08:43

## 2021-04-12 RX ADMIN — Medication SCH CAP: at 21:25

## 2021-04-12 NOTE — PCM.PN
- General Info


Date of Service: 04/12/21


Subjective Update: 





Mr. Rivera is felt significantly improved over the last 24 hours.  Appetite and 

overall strength seem to be improving.  Heart rate control has been very good 

with current management.


Functional Status: Reports: Tolerating Diet, Ambulating, Urinating





- Review of Systems


General: Reports: Weakness, Fatigue.  Denies: Fever, Chills


Pulmonary: Reports: No Symptoms


Cardiovascular: Reports: No Symptoms


Gastrointestinal: Reports: No Symptoms


Genitourinary: Reports: No Symptoms





- Patient Data


Vitals - Most Recent: 


                                Last Vital Signs











Temp  98.3 F   04/12/21 14:58


 


Pulse  62   04/12/21 14:58


 


Resp  16   04/12/21 14:58


 


BP  101/43 L  04/12/21 14:58


 


Pulse Ox  94 L  04/12/21 14:58











Weight - Most Recent: 123 lb


I&O - Last 24 Hours: 


                                 Intake & Output











 04/12/21 04/12/21 04/12/21





 06:59 14:59 22:59


 


Output Total 700  


 


Balance -700  











Lab Results Last 24 Hours: 


                         Laboratory Results - last 24 hr











  04/12/21 04/12/21 04/12/21 Range/Units





  04:15 04:15 04:15 


 


WBC  3.9 L    (4.5-11.0)  K/uL


 


RBC  3.63 L    (4.30-5.90)  M/uL


 


Hgb  10.2 L    (12.0-15.0)  g/dL


 


Hct  31.7 L    (40.0-54.0)  %


 


MCV  87    (80-98)  fL


 


MCH  28    (27-31)  pg


 


MCHC  32    (32-36)  %


 


Plt Count  139 L    (150-400)  K/uL


 


D-Dimer, Quantitative   902.52 H   (0.0-500.0)  ng/mL


 


Sodium    135 L  (140-148)  mmol/L


 


Potassium    3.6  (3.6-5.2)  mmol/L


 


Chloride    98 L  (100-108)  mmol/L


 


Carbon Dioxide    27  (21-32)  mmol/L


 


Anion Gap    13.6  (5.0-14.0)  mmol/L


 


BUN    24 H  (7-18)  mg/dL


 


Creatinine    1.4 H  (0.8-1.3)  mg/dL


 


Est Cr Clr Drug Dosing    30.06  mL/min


 


Estimated GFR (MDRD)    48 L  (>60)  


 


Glucose    105  ()  mg/dL


 


Calcium    8.0 L  (8.5-10.1)  mg/dL


 


C-Reactive Protein    10.64 H  (0.0-0.3)  mg/dL











Med Orders - Current: 


                               Current Medications





Acetaminophen (Acetaminophen 325 Mg Tab)  650 mg PO ONETIME PRN


   PRN Reason: HEADACHE,CHILLS


Hydrocodone Bitart/Acetaminophen (Acetaminophen/Hydrocodone 325-10 Mg Tab)  2 

tab PO Q6H PRN


   PRN Reason: Pain


   Last Admin: 04/12/21 05:53 Dose:  2 tab


   Documented by: 


Albuterol (Albuterol 0.083% 2.5 Mg/3 Ml Neb Soln)  2.5 mg NEB Q4H PRN


   PRN Reason: Shortness Of Breath/wheezing


Aspirin (Aspirin 325 Mg Tab.Ec)  325 mg PO DAILY [facility]


   Last Admin: 04/12/21 08:43 Dose:  325 mg


   Documented by: 


Benzocaine/Menthol (Benzocaine/Cetylpyridinium/Menthol Lozenge)  1 lozenge MUCME

M Q2H PRN


   PRN Reason: Sore Throat


Benzonatate (Benzonatate 100 Mg Cap)  100 mg PO TID PRN


   PRN Reason: Cough


Bismuth Subsalicylate (Bismuth Subsalicylate 262 Mg/15 Ml Susp 236 Ml Bottle)  

30 ml PO Q4H PRN


   PRN Reason: Dyspepsia


Citalopram Hydrobromide (Citalopram 10 Mg Tab)  10 mg PO DAILY [facility]


   Last Admin: 04/12/21 08:43 Dose:  10 mg


   Documented by: 


Diltiazem HCl (Diltiazem 180 Mg Cap.Cd)  180 mg PO DAILY [facility]


   Last Admin: 04/12/21 08:43 Dose:  180 mg


   Documented by: 


Enoxaparin Sodium (Enoxaparin 40 Mg/0.4 Ml Syringe)  40 mg SUBCUT Q24H [facility]


   Last Admin: 04/11/21 16:00 Dose:  40 mg


   Documented by: 


Guaifenesin/Dextromethorphan (Guaifenesin/Dextromethorphan 100-10 Mg/5 Ml Soln 

10 Ml Cup)  10 ml PO Q4H PRN


   PRN Reason: Cough


Hydrochlorothiazide (Hydrochlorothiazide 12.5 Mg Cap)  12.5 mg PO BID [facility]


   Last Admin: 04/12/21 08:43 Dose:  12.5 mg


   Documented by: 


Lactobacillus Rhamnosus (Lactobacillus Rhamnosus Gg (Probiotic) Cap)  1 cap PO 

BID [facility]


   Last Admin: 04/12/21 08:42 Dose:  1 cap


   Documented by: 


Lidocaine HCl (Lidocaine 2% Viscous Solution 15 Ml Cup)  15 ml PO Q4H PRN


   PRN Reason: Sore Throat


Lisinopril (Lisinopril 20 Mg Tab)  20 mg PO BID [facility]


   Last Admin: 04/12/21 08:44 Dose:  20 mg


   Documented by: 


Lorazepam (Lorazepam 0.5 Mg Tab)  0.5 mg PO Q8H PRN


   PRN Reason: Anxiety


   Last Admin: 04/10/21 20:24 Dose:  0.5 mg


   Documented by: 


Lorazepam (Lorazepam 2 Mg/Ml Sdv)  0.5 mg IVPUSH Q4H PRN


   PRN Reason: Nausea/Vomiting


Magnesium Hydroxide (Magnesium Hydroxide 400 Mg/5 Ml Susp 30 Ml Cup)  30 ml PO 

Q12H PRN


   PRN Reason: Constipation


Magnesium Oxide (Magnesium Oxide 400 Mg Tab)  400 mg PO BID [facility]


   Last Admin: 04/12/21 08:43 Dose:  400 mg


   Documented by: 


Melatonin (Melatonin 3 Mg Tab)  9 mg PO BEDTIME [facility]


   Last Admin: 04/11/21 21:16 Dose:  9 mg


   Documented by: 


Metoprolol Succinate (Metoprolol Succinate 50 Mg Tab.Er)  50 mg PO DAILY [facility]


   Last Admin: 04/12/21 08:42 Dose:  50 mg


   Documented by: 


Morphine Sulfate (Morphine 2 Mg/Ml Syringe)  2 mg IVPUSH Q2H PRN


   PRN Reason: Pain (severe 7-10)


   Last Admin: 04/09/21 18:54 Dose:  2 mg


   Documented by: 


Ondansetron HCl (Ondansetron 4 Mg/2 Ml Sdv)  4 mg IV Q6H PRN


   PRN Reason: Nausea/Vomiting


   Last Admin: 04/10/21 20:26 Dose:  4 mg


   Documented by: 


Ondansetron HCl (Ondansetron 4 Mg Tab.Dis)  4 mg PO Q6H PRN


   PRN Reason: Nausea able to take PO


Pantoprazole Sodium (Pantoprazole 40 Mg Tab.Cr)  40 mg PO ACBREAKFAST [facility]


   Last Admin: 04/12/21 08:43 Dose:  40 mg


   Documented by: 


Senna/Docusate Sodium (Docusate Sodium/Sennosides 50-8.6 Mg Tab)  1 tab PO BID 

PRN


   PRN Reason: Constipation


Simvastatin (Simvastatin 20 Mg Tab)  80 mg PO BEDTIME [facility]


   Last Admin: 04/11/21 21:16 Dose:  80 mg


   Documented by: 


Tamsulosin HCl (Tamsulosin 0.4 Mg Cap.Er)  0.4 mg PO BEDTIME [facility]


   Last Admin: 04/11/21 21:16 Dose:  0.4 mg


   Documented by: 





Discontinued Medications





Hydrocodone Bitart/Acetaminophen (Acetaminophen/Hydrocodone 325-10 Mg Tab)  2 

tab PO ONETIME ONE


   Stop: 04/09/21 13:18


   Last Admin: 04/09/21 13:26 Dose:  2 tab


   Documented by: 


Bismuth Subsalicylate (Bismuth Subsalicylate 262 Mg Tab.Chew)  524 mg PO ONETIME

ONE


   Stop: 04/09/21 15:46


   Last Admin: 04/09/21 16:10 Dose:  524 mg


   Documented by: 


Diltiazem HCl (Diltiazem 25 Mg/5 Ml Sdv)  15 mg IVPUSH ONETIME ONE


   Stop: 04/09/21 11:26


   Last Admin: 04/09/21 11:33 Dose:  15 mg


   Documented by: 


Diltiazem HCl (Diltiazem 25 Mg/5 Ml Sdv)  10 mg IVPUSH ONETIME ONE


   Stop: 04/10/21 21:46


   Last Admin: 04/10/21 22:29 Dose:  Not Given


   Documented by: 


Diltiazem HCl (Diltiazem 100 Mg Advvial) Confirm Administered Dose 100 mg .ROUTE

.STK-MED ONE


   Stop: 04/10/21 21:49


   Last Admin: 04/10/21 22:29 Dose:  Not Given


   Documented by: 


Diltiazem HCl (Diltiazem 25 Mg/5 Ml Sdv) Confirm Administered Dose 25 mg .ROUTE 

.STK-MED ONE


   Stop: 04/10/21 21:50


   Last Admin: 04/10/21 22:29 Dose:  Not Given


   Documented by: 


Diphenhydramine HCl (Diphenhydramine 50 Mg/Ml Sdv)  50 mg IVPUSH ONETIME PRN


   PRN Reason: ALLERGIC RXN


   Stop: 04/10/21 16:00


Epinephrine HCl (Epinephrine 1 Mg/Ml Sdv)  0.3 mg IM ONETIME PRN


   PRN Reason: ALLERGIC RXN


   Stop: 04/10/21 16:00


Famotidine (Famotidine 20 Mg/2 Ml Sdv)  20 mg IV ONETIME PRN


   PRN Reason: ALLERGIC RXN


   Stop: 04/10/21 16:00


Sodium Chloride (Normal Saline)  100 mls @ 4 mls/sec IV ASDIRECTED [facility]


   Stop: 04/09/21 10:16


   Last Admin: 04/09/21 10:56 Dose:  4 mls/sec


   Documented by: 


Diltiazem HCl 100 mg/ Sodium (Chloride)  100 mls @ 5 mls/hr IV TITRATE [facility]; 

Protocol


   Last Titration: 04/10/21 01:15 Dose:  2.5 mg/hr, 2.5 mls/hr


   Documented by: 


Sodium Chloride (Normal Saline)  1,000 mls @ 100 mls/hr IV ASDIRECTED [facility]


   Last Admin: 04/09/21 16:16 Dose:  100 mls/hr


   Documented by: 


Levofloxacin/Dextrose 750 mg/ (Premix)  150 mls @ 100 mls/hr IV Q24H [facility]


   Last Admin: 04/09/21 16:10 Dose:  100 mls/hr


   Documented by: 


Sodium Chloride (Normal Saline)  1,000 mls @ 25 mls/hr IV ASDIRECTED [facility]


Non-Formulary Medication 1,200 mg/ Non-Formulary Medication 1,200 mg/ Sodium 

Chloride  170 mls @ 310 mls/hr IV ONETIME ONE


   Stop: 04/10/21 10:02


   Last Admin: 04/10/21 09:40 Dose:  310 mls/hr


   Documented by: 


Diltiazem HCl 100 mg/ Sodium (Chloride)  100 mls @ 5 mls/hr IV TITRATE [facility]; 

Protocol


Sodium Chloride (Normal Saline) Confirm Administered Dose 100 mls @ as directed 

.ROUTE .STK-MED ONE


   Stop: 04/10/21 21:51


   Last Admin: 04/10/21 22:30 Dose:  Not Given


   Documented by: 


Iopamidol (Iopamidol 755 Mg/Ml 100 Ml Bottle)  100 ml IV .AS DIRECTED [facility]


   Stop: 04/09/21 10:16


   Last Admin: 04/09/21 10:57 Dose:  100 ml


   Documented by: 


Methylprednisolone Sodium Succinate (Methylprednisolone Sodium Succinate 125 

Mg/2 Ml Sdv)  125 mg IVPUSH ONETIME PRN


   PRN Reason: ALLERGIC RXN


   Stop: 04/10/21 16:00


Potassium Chloride (Potassium Chloride 20 Meq Tab.Er)  40 meq PO ONETIME ONE


   Stop: 04/10/21 08:31


   Last Admin: 04/10/21 08:21 Dose:  40 meq


   Documented by: 


Sodium Chloride (Sodium Chloride 0.9% 10 Ml Syringe)  10 ml FLUSH ONETIME ONE


   Stop: 04/09/21 10:08


   Last Admin: 04/09/21 10:56 Dose:  10 ml


   Documented by: 











- Exam


Quality Assessment: DVT Prophylaxis


General: Alert, Oriented, Cooperative, Mild Distress


Lungs: Clear to Auscultation, Normal Respiratory Effort


Cardiovascular: Regular Rate, No Murmurs, Irregular Rhythm


GI/Abdominal Exam: Soft, Non-Tender, No Organomegaly, No Distention


Extremities: Non-Tender, No Pedal Edema





- Patient Data


Lab Results Last 24 hrs: 


                         Laboratory Results - last 24 hr











  04/12/21 04/12/21 04/12/21 Range/Units





  04:15 04:15 04:15 


 


WBC  3.9 L    (4.5-11.0)  K/uL


 


RBC  3.63 L    (4.30-5.90)  M/uL


 


Hgb  10.2 L    (12.0-15.0)  g/dL


 


Hct  31.7 L    (40.0-54.0)  %


 


MCV  87    (80-98)  fL


 


MCH  28    (27-31)  pg


 


MCHC  32    (32-36)  %


 


Plt Count  139 L    (150-400)  K/uL


 


D-Dimer, Quantitative   902.52 H   (0.0-500.0)  ng/mL


 


Sodium    135 L  (140-148)  mmol/L


 


Potassium    3.6  (3.6-5.2)  mmol/L


 


Chloride    98 L  (100-108)  mmol/L


 


Carbon Dioxide    27  (21-32)  mmol/L


 


Anion Gap    13.6  (5.0-14.0)  mmol/L


 


BUN    24 H  (7-18)  mg/dL


 


Creatinine    1.4 H  (0.8-1.3)  mg/dL


 


Est Cr Clr Drug Dosing    30.06  mL/min


 


Estimated GFR (MDRD)    48 L  (>60)  


 


Glucose    105  ()  mg/dL


 


Calcium    8.0 L  (8.5-10.1)  mg/dL


 


C-Reactive Protein    10.64 H  (0.0-0.3)  mg/dL











Result Diagrams: 


                                 04/12/21 04:15





                                 04/12/21 04:15





Sepsis Event Note





- Evaluation


Sepsis Screening Result: No Definite Risk





- Focused Exam


Vital Signs: 


                                   Vital Signs











  Temp Pulse Pulse Resp BP BP Pulse Ox


 


 04/12/21 14:58  98.3 F   62  16   101/43 L  94 L


 


 04/12/21 11:00     16   148/45 H  96


 


 04/12/21 08:44      110/45 L  


 


 04/12/21 08:42   70    115/45 L  


 


 04/12/21 07:52  97.7 F   70  18   115/45 L  93 L














- Problem List Review


Problem List Initiated/Reviewed/Updated: Yes





- My Orders


Last 24 Hours: 


My Active Orders





04/13/21 05:00


COMPREHENSIVE METABOLIC PN,CMP [CHEM] Timed 





04/13/21 05:11


CRP [C-REACTIVE PROTEIN] [CHEM] AM 


D Dimer [D-DIMER QUANTITATIVE] [COAG] AM 














- Plan


Plan:: 





ASSESSMENT AND PLAN - 





Paroxysmal atrial fibrillation with rapid ventricular response-he remains in 

sinus rhythm with some ectopy though much less so this morning. 


-Optimize electrolytes


-Continue oral diltiazem


-Continue metoprolol


-Continue cardiac monitoring





Positive COVID-19-much improved after he received monoclonal antibody infusion


-Maintain isolation precautions


-Maintain negative fluid balance


-Repeat labs in the morning





Coronary artery disease-extensive history.  No chest pain during the admission.


-Continue medical management





Stage III chronic kidney disease-creatinine near baseline. 





Maintenance issues - 


-DVT prophylaxis-enoxaparin


-GI prophylaxis-PPI


-Nutrition-low-sodium





CODE STATUS -DNR/DNI





Disposition -I anticipate discharge home tomorrow

## 2021-04-13 VITALS — HEART RATE: 62 BPM | DIASTOLIC BLOOD PRESSURE: 49 MMHG | SYSTOLIC BLOOD PRESSURE: 123 MMHG

## 2021-04-13 RX ADMIN — DILTIAZEM HYDROCHLORIDE SCH MG: 180 CAPSULE, COATED, EXTENDED RELEASE ORAL at 08:09

## 2021-04-13 RX ADMIN — ASPIRIN SCH MG: 325 TABLET, DELAYED RELEASE ORAL at 08:10

## 2021-04-13 RX ADMIN — Medication SCH CAP: at 08:11

## 2021-04-13 NOTE — [id number]
**Discharge Summary





- Hospital Course


Brief History: Mr. Rivera is an 86-year-old gentleman who was admitted through 

the emergency department with weakness, diarrhea, cough, syncope, secondary to 

COVID-19 infection and atrial fibrillation with rapid ventricular response





- Discharge Data


Discharge Date: 04/13/21


Discharge Disposition: Home, Self-Care 01


Condition: Good





- Referral to Home Health


Primary Care Physician: 


Shane Hinton MD








- Discharge Diagnosis/Problem(s)


(1) Paroxysmal atrial fibrillation with rapid ventricular response


SNOMED Code(s): 6853810015


   ICD Code: I48.0 - PAROXYSMAL ATRIAL FIBRILLATION   Status: Acute   Current 

Visit: Yes   





(2) COVID-19


SNOMED Code(s): 343048597


   ICD Code: U07.1 - COVID-19   Status: Acute   Current Visit: Yes   





(3) CKD (chronic kidney disease) stage 3, GFR 30-59 ml/min


SNOMED Code(s): 393576522


   ICD Code: N18.30 - CHRONIC KIDNEY DISEASE, STAGE 3 UNSPECIFIED   Status: 

Acute   Current Visit: Yes   





(4) CAD (coronary artery disease)


SNOMED Code(s): 32795320


   ICD Code: I25.10 - ATHSCL HEART DISEASE OF NATIVE CORONARY ARTERY W/O ANG 

PCTRS   Status: Chronic   Current Visit: No   


Qualifiers: 


   Coronary Disease-Associated Artery/Lesion type: native artery   Native vs. 

transplanted heart: native heart   Associated angina: without angina   Qualified

Code(s): I25.10 - Atherosclerotic heart disease of native coronary artery 

without angina pectoris   





- Patient Summary/Data


Consults: 


                                  Consultations





04/12/21 07:00


PT Evaluation and Treatment [CONS] Routine 


   Please Evaluate and Treat.


   PT Reason for Consult: Strengthening


   This query below is only for informational purposes and is not editable.


   Admission Diagnosis/Problem: Pneumonia











Hospital Course: 





Mr. Rivera presented to the emergency room after an episode of syncope.  He 

reports several days of diarrhea and poor intake and thinks that he is getting 

dehydrated because of the diarrhea.  He felt a little dizzy before he collapsed 

on the floor.  He feels a little short of breath and has been coughing.  He had 

some chest pain yesterday but none today.  He reports intermittent watery 

diarrhea that is somewhat urgent.  He does have some abdominal pain which is 

mild and crampy.  He has been taking his usual pain pills and they do seem to 

help some.  He has aches and pains in most of his joints but this is chronic and

 stable.  He was seen in the emergency room yesterday but work-up was reassuring

 and he wanted to try it at home. Work-up in the emergency room revealed 

evidence for mild dehydration.  He was in atrial fibrillation with a rapid 

ventricular response and was started on a diltiazem infusion.  His D-dimer was 

elevated so a CT pulmonary angiogram was obtained.  There was no PE but it did 

show a left lower lobe pneumonia/pneumonitis.  COVID-19 testing was done prior 

to admission and did come back positive.  It was felt likely that infiltrate 

noted on CT scan was secondary to his current Covid infection.  He was continued

 on IV diltiazem after admission and later spontaneously converted to sinus 

rhythm with frequent premature ventricular complexes.  He was not specifically 

admitted for Covid infection and was found to be at high risk for progression.  

For this reason he was treated with IV monoclonal antibody after admission to 

the hospital.  He improved significantly following monoclonal antibody infusion.

  By the time of discharge denied significant shortness of breath with 

resolution of most of his other symptoms.  Follow-up appointment will be 

scheduled with his primary care provider within 1 week.  Activity will be as 

tolerated and he will resume his usual diet.





- Patient Instructions


Diet: Usual Diet as Tolerated


Activity: As Tolerated


Other/Special Instructions: Please schedule follow-up appointment with primary 

care provider within 1 week.





- Discharge Plan


*PRESCRIPTION DRUG MONITORING PROGRAM REVIEWED*: Not Applicable


*COPY OF PRESCRIPTION DRUG MONITORING REPORT IN PATIENT [facility]: Not Applicable


Home Medications: 


                                    Home Meds





Aspirin 81 mg PO DAILY 05/31/14 [History]


Lisinopril/Hydrochlorothiazide [Lisinopril-Hctz 20-12.5 mg Tab] 1 each PO BID 

05/31/14 [History]


Metoprolol Succinate [Toprol XL 50mg] 1 tab PO DAILY 05/31/14 [History]


Nitroglycerin 0.4 mg SL ASDIRECTED PRN 05/31/14 [History]


dilTIAZem HCL [Diltiazem 12Hr ER] 90 mg PO BID 05/31/14 [History]


Hydrocodone/Acetaminophen [Hydrocodon-Acetaminophn ] 2 tab PO Q8H PRN 

10/23/19 [History]


LORazepam 0.5 mg PO Q8H PRN 10/23/19 [History]


Tamsulosin [Flomax] 0.4 mg PO BEDTIME #30 cap.er 06/26/20 [Rx]


Citalopram [Citalopram HBr] 10 mg PO DAILY 04/08/21 [History]


Pantoprazole [ProTONIX***] 40 mg PO DAILY 04/08/21 [History]


Simvastatin 80 mg PO BEDTIME 04/08/21 [History]








Patient Handouts:  Atrial Fibrillation, Prevent the Spread of COVID-19 if You 

Are Sick - [facility] [place]


Referrals: 


Shane Hinton MD [Primary Care Provider] - 04/23/21 1:30 pm (Please arrive 15 

minutes early to register for your appointment.)





- Discharge Summary/Plan Comment


DC Time >30 min.: No





- Patient Data


Vitals - Most Recent: 


                                Last Vital Signs











Temp  98.1 F   04/13/21 10:56


 


Pulse  62   04/13/21 10:56


 


Resp  20   04/13/21 10:56


 


BP  123/49 L  04/13/21 10:56


 


Pulse Ox  97   04/13/21 12:56











Weight - Most Recent: 123 lb


I&O - Last 24 hours: 


                                 Intake & Output











 04/12/21 04/13/21 04/13/21





 22:59 06:59 14:59


 


Intake Total   500


 


Balance   500











Lab Results - Last 24 hrs: 


                         Laboratory Results - last 24 hr











  04/13/21 04/13/21 04/13/21 Range/Units





  05:18 05:18 05:18 


 


D-Dimer, Quantitative   1261.19 H   (0.0-500.0)  ng/mL


 


Sodium  137 L    (140-148)  mmol/L


 


Potassium  4.1    (3.6-5.2)  mmol/L


 


Chloride  99 L    (100-108)  mmol/L


 


Carbon Dioxide  29    (21-32)  mmol/L


 


Anion Gap  13.1    (5.0-14.0)  mmol/L


 


BUN  24 H    (7-18)  mg/dL


 


Creatinine  1.2    (0.8-1.3)  mg/dL


 


Est Cr Clr Drug Dosing  34.87    mL/min


 


Estimated GFR (MDRD)  57 L    (>60)  


 


Glucose  120 H    ()  mg/dL


 


Calcium  8.6    (8.5-10.1)  mg/dL


 


Total Bilirubin  0.6    (0.2-1.0)  mg/dL


 


AST  26    (15-37)  U/L


 


ALT  28    (12-78)  U/L


 


Alkaline Phosphatase  50    ()  U/L


 


C-Reactive Protein    5.57 H  (0.0-0.3)  mg/dL


 


Total Protein  6.1 L    (6.4-8.2)  g/dL


 


Albumin  2.6 L    (3.4-5.0)  g/dL


 


Globulin  3.5    (2.3-3.5)  g/dL


 


Albumin/Globulin Ratio  0.7 L    (1.2-2.2)  











Med Orders - Current: 


                               Current Medications





Acetaminophen (Acetaminophen 325 Mg Tab)  650 mg PO ONETIME PRN


   PRN Reason: HEADACHE,CHILLS


   Last Admin: 04/12/21 21:15 Dose:  650 mg


   Documented by: 


Hydrocodone Bitart/Acetaminophen (Acetaminophen/Hydrocodone 325-10 Mg Tab)  2 

tab PO Q6H PRN


   PRN Reason: Pain


   Last Admin: 04/12/21 05:53 Dose:  2 tab


   Documented by: 


Albuterol (Albuterol 0.083% 2.5 Mg/3 Ml Neb Soln)  2.5 mg NEB Q4H PRN


   PRN Reason: Shortness Of Breath/wheezing


Aspirin (Aspirin 325 Mg Tab.Ec)  325 mg PO DAILY [facility]


   Last Admin: 04/13/21 08:10 Dose:  325 mg


   Documented by: 


Benzocaine/Menthol (Benzocaine/Cetylpyridinium/Menthol Lozenge)  1 lozenge 

MUCMEM Q2H PRN


   PRN Reason: Sore Throat


Benzonatate (Benzonatate 100 Mg Cap)  100 mg PO TID PRN


   PRN Reason: Cough


Bismuth Subsalicylate (Bismuth Subsalicylate 262 Mg/15 Ml Susp 236 Ml Bottle)  

30 ml PO Q4H PRN


   PRN Reason: Dyspepsia


   Last Admin: 04/13/21 12:37 Dose:  30 ml


   Documented by: 


Citalopram Hydrobromide (Citalopram 10 Mg Tab)  10 mg PO DAILY [facility]


   Last Admin: 04/13/21 08:11 Dose:  10 mg


   Documented by: 


Diltiazem HCl (Diltiazem 180 Mg Cap.Cd)  180 mg PO DAILY [facility]


   Last Admin: 04/13/21 08:09 Dose:  180 mg


   Documented by: 


Enoxaparin Sodium (Enoxaparin 40 Mg/0.4 Ml Syringe)  40 mg SUBCUT Q24H [facility]


   Last Admin: 04/12/21 18:06 Dose:  40 mg


   Documented by: 


Guaifenesin/Dextromethorphan (Guaifenesin/Dextromethorphan 100-10 Mg/5 Ml Soln 

10 Ml Cup)  10 ml PO Q4H PRN


   PRN Reason: Cough


Hydrochlorothiazide (Hydrochlorothiazide 12.5 Mg Cap)  12.5 mg PO BID [facility]


   Last Admin: 04/13/21 08:09 Dose:  12.5 mg


   Documented by: 


Lactobacillus Rhamnosus (Lactobacillus Rhamnosus Gg (Probiotic) Cap)  1 cap PO 

BID [facility]


   Last Admin: 04/13/21 08:11 Dose:  1 cap


   Documented by: 


Lidocaine HCl (Lidocaine 2% Viscous Solution 15 Ml Cup)  15 ml PO Q4H PRN


   PRN Reason: Sore Throat


Lisinopril (Lisinopril 20 Mg Tab)  20 mg PO BID [facility]


   Last Admin: 04/13/21 08:10 Dose:  20 mg


   Documented by: 


Lorazepam (Lorazepam 0.5 Mg Tab)  0.5 mg PO Q8H PRN


   PRN Reason: Anxiety


   Last Admin: 04/12/21 19:28 Dose:  0.5 mg


   Documented by: 


Lorazepam (Lorazepam 2 Mg/Ml Sdv)  0.5 mg IVPUSH Q4H PRN


   PRN Reason: Nausea/Vomiting


Magnesium Hydroxide (Magnesium Hydroxide 400 Mg/5 Ml Susp 30 Ml Cup)  30 ml PO 

Q12H PRN


   PRN Reason: Constipation


Magnesium Oxide (Magnesium Oxide 400 Mg Tab)  400 mg PO BID [facility]


   Last Admin: 04/13/21 08:08 Dose:  400 mg


   Documented by: 


Melatonin (Melatonin 3 Mg Tab)  9 mg PO BEDTIME [facility]


   Last Admin: 04/12/21 21:24 Dose:  9 mg


   Documented by: 


Metoprolol Succinate (Metoprolol Succinate 50 Mg Tab.Er)  50 mg PO DAILY [facility]


   Last Admin: 04/13/21 08:10 Dose:  50 mg


   Documented by: 


Morphine Sulfate (Morphine 2 Mg/Ml Syringe)  2 mg IVPUSH Q2H PRN


   PRN Reason: Pain (severe 7-10)


   Last Admin: 04/09/21 18:54 Dose:  2 mg


   Documented by: 


Ondansetron HCl (Ondansetron 4 Mg/2 Ml Sdv)  4 mg IV Q6H PRN


   PRN Reason: Nausea/Vomiting


   Last Admin: 04/10/21 20:26 Dose:  4 mg


   Documented by: 


Ondansetron HCl (Ondansetron 4 Mg Tab.Dis)  4 mg PO Q6H PRN


   PRN Reason: Nausea able to take PO


   Last Admin: 04/13/21 13:00 Dose:  4 mg


   Documented by: 


Pantoprazole Sodium (Pantoprazole 40 Mg Tab.Cr)  40 mg PO ACBREAKFAST [facility]


   Last Admin: 04/13/21 08:09 Dose:  40 mg


   Documented by: 


Senna/Docusate Sodium (Docusate Sodium/Sennosides 50-8.6 Mg Tab)  1 tab PO BID 

PRN


   PRN Reason: Constipation


Simvastatin (Simvastatin 20 Mg Tab)  80 mg PO BEDTIME [facility]


   Last Admin: 04/12/21 21:25 Dose:  80 mg


   Documented by: 


Tamsulosin HCl (Tamsulosin 0.4 Mg Cap.Er)  0.4 mg PO BEDTIME [facility]


   Last Admin: 04/12/21 21:25 Dose:  0.4 mg


   Documented by: 





Discontinued Medications





Hydrocodone Bitart/Acetaminophen (Acetaminophen/Hydrocodone 325-10 Mg Tab)  2 

tab PO ONETIME ONE


   Stop: 04/09/21 13:18


   Last Admin: 04/09/21 13:26 Dose:  2 tab


   Documented by: 


Bismuth Subsalicylate (Bismuth Subsalicylate 262 Mg Tab.Chew)  524 mg PO ONETIME

ONE


   Stop: 04/09/21 15:46


   Last Admin: 04/09/21 16:10 Dose:  524 mg


   Documented by: 


Diltiazem HCl (Diltiazem 25 Mg/5 Ml Sdv)  15 mg IVPUSH ONETIME ONE


   Stop: 04/09/21 11:26


   Last Admin: 04/09/21 11:33 Dose:  15 mg


   Documented by: 


Diltiazem HCl (Diltiazem 25 Mg/5 Ml Sdv)  10 mg IVPUSH ONETIME ONE


   Stop: 04/10/21 21:46


   Last Admin: 04/10/21 22:29 Dose:  Not Given


   Documented by: 


Diltiazem HCl (Diltiazem 100 Mg Advvial) Confirm Administered Dose 100 mg .ROUTE

.STK-MED ONE


   Stop: 04/10/21 21:49


   Last Admin: 04/10/21 22:29 Dose:  Not Given


   Documented by: 


Diltiazem HCl (Diltiazem 25 Mg/5 Ml Sdv) Confirm Administered Dose 25 mg .ROUTE 

.STK-MED ONE


   Stop: 04/10/21 21:50


   Last Admin: 04/10/21 22:29 Dose:  Not Given


   Documented by: 


Diphenhydramine HCl (Diphenhydramine 50 Mg/Ml Sdv)  50 mg IVPUSH ONETIME PRN


   PRN Reason: ALLERGIC RXN


   Stop: 04/10/21 16:00


Epinephrine HCl (Epinephrine 1 Mg/Ml Sdv)  0.3 mg IM ONETIME PRN


   PRN Reason: ALLERGIC RXN


   Stop: 04/10/21 16:00


Famotidine (Famotidine 20 Mg/2 Ml Sdv)  20 mg IV ONETIME PRN


   PRN Reason: ALLERGIC RXN


   Stop: 04/10/21 16:00


Sodium Chloride (Normal Saline)  100 mls @ 4 mls/sec IV ASDIRECTED REGINO


   Stop: 04/09/21 10:16


   Last Admin: 04/09/21 10:56 Dose:  4 mls/sec


   Documented by: 


Diltiazem HCl 100 mg/ Sodium (Chloride)  100 mls @ 5 mls/hr IV TITRATE REGINO; 

Protocol


   Last Titration: 04/10/21 01:15 Dose:  2.5 mg/hr, 2.5 mls/hr


   Documented by: 


Sodium Chloride (Normal Saline)  1,000 mls @ 100 mls/hr IV ASDIRECTED REGINO


   Last Admin: 04/09/21 16:16 Dose:  100 mls/hr


   Documented by: 


Levofloxacin/Dextrose 750 mg/ (Premix)  150 mls @ 100 mls/hr IV Q24H REGINO


   Last Admin: 04/09/21 16:10 Dose:  100 mls/hr


   Documented by: 


Sodium Chloride (Normal Saline)  1,000 mls @ 25 mls/hr IV ASDIRECTED [facility]


Non-Formulary Medication 1,200 mg/ Non-Formulary Medication 1,200 mg/ Sodium 

Chloride  170 mls @ 310 mls/hr IV ONETIME ONE


   Stop: 04/10/21 10:02


   Last Admin: 04/10/21 09:40 Dose:  310 mls/hr


   Documented by: 


Diltiazem HCl 100 mg/ Sodium (Chloride)  100 mls @ 5 mls/hr IV TITRATE REGINO; 

Protocol


Sodium Chloride (Normal Saline) Confirm Administered Dose 100 mls @ as directed 

.ROUTE .STK-MED ONE


   Stop: 04/10/21 21:51


   Last Admin: 04/10/21 22:30 Dose:  Not Given


   Documented by: 


Iopamidol (Iopamidol 755 Mg/Ml 100 Ml Bottle)  100 ml IV .AS DIRECTED REGINO


   Stop: 04/09/21 10:16


   Last Admin: 04/09/21 10:57 Dose:  100 ml


   Documented by: 


Methylprednisolone Sodium Succinate (Methylprednisolone Sodium Succinate 125 

Mg/2 Ml Sdv)  125 mg IVPUSH ONETIME PRN


   PRN Reason: ALLERGIC RXN


   Stop: 04/10/21 16:00


Potassium Chloride (Potassium Chloride 20 Meq Tab.Er)  40 meq PO ONETIME ONE


   Stop: 04/10/21 08:31


   Last Admin: 04/10/21 08:21 Dose:  40 meq


   Documented by: 


Sodium Chloride (Sodium Chloride 0.9% 10 Ml Syringe)  10 ml FLUSH ONETIME ONE


   Stop: 04/09/21 10:08


   Last Admin: 04/09/21 10:56 Dose:  10 ml


   Documented by: 











- Exam


General: Reports: Alert, Oriented, Cooperative, Mild Distress


Lungs: Reports: Clear to Auscultation, Normal Respiratory Effort


Cardiovascular: Reports: Regular Rate, Regular Rhythm, No Murmurs


GI/Abdominal Exam: Soft, Non-Tender, No Organomegaly, No Distention


Extremities: Non-Tender, No Pedal Edema

## 2021-05-03 ENCOUNTER — HOSPITAL ENCOUNTER (INPATIENT)
Dept: HOSPITAL 11 - JP.ED | Age: 86
LOS: 2 days | Discharge: HOME HEALTH | DRG: 390 | End: 2021-05-05
Attending: INTERNAL MEDICINE | Admitting: INTERNAL MEDICINE
Payer: MEDICARE

## 2021-05-03 DIAGNOSIS — Z90.49: ICD-10-CM

## 2021-05-03 DIAGNOSIS — K56.600: Primary | ICD-10-CM

## 2021-05-03 DIAGNOSIS — L20.9: ICD-10-CM

## 2021-05-03 DIAGNOSIS — I48.0: ICD-10-CM

## 2021-05-03 DIAGNOSIS — F32.9: ICD-10-CM

## 2021-05-03 DIAGNOSIS — H54.7: ICD-10-CM

## 2021-05-03 DIAGNOSIS — F41.9: ICD-10-CM

## 2021-05-03 DIAGNOSIS — N42.9: ICD-10-CM

## 2021-05-03 DIAGNOSIS — Z85.828: ICD-10-CM

## 2021-05-03 DIAGNOSIS — Z95.2: ICD-10-CM

## 2021-05-03 DIAGNOSIS — I25.10: ICD-10-CM

## 2021-05-03 DIAGNOSIS — Z66: ICD-10-CM

## 2021-05-03 DIAGNOSIS — Z95.1: ICD-10-CM

## 2021-05-03 DIAGNOSIS — I12.9: ICD-10-CM

## 2021-05-03 DIAGNOSIS — Z95.5: ICD-10-CM

## 2021-05-03 DIAGNOSIS — I10: ICD-10-CM

## 2021-05-03 DIAGNOSIS — Z79.899: ICD-10-CM

## 2021-05-03 DIAGNOSIS — G89.29: ICD-10-CM

## 2021-05-03 DIAGNOSIS — Z79.82: ICD-10-CM

## 2021-05-03 DIAGNOSIS — E86.0: ICD-10-CM

## 2021-05-03 DIAGNOSIS — K21.9: ICD-10-CM

## 2021-05-03 DIAGNOSIS — M54.9: ICD-10-CM

## 2021-05-03 DIAGNOSIS — M19.90: ICD-10-CM

## 2021-05-03 DIAGNOSIS — K57.90: ICD-10-CM

## 2021-05-03 DIAGNOSIS — Z98.890: ICD-10-CM

## 2021-05-03 DIAGNOSIS — N18.31: ICD-10-CM

## 2021-05-03 PROCEDURE — C9113 INJ PANTOPRAZOLE SODIUM, VIA: HCPCS

## 2021-05-03 PROCEDURE — 0D9670Z DRAINAGE OF STOMACH WITH DRAINAGE DEVICE, VIA NATURAL OR ARTIFICIAL OPENING: ICD-10-PCS | Performed by: INTERNAL MEDICINE

## 2021-05-03 NOTE — EDM.PDOC
ED HPI GENERAL MEDICAL PROBLEM





- General


Chief Complaint: Abdominal Pain


Stated Complaint: MEDICAL VIA NORTH


Time Seen by Provider: 05/03/21 21:12


Source of Information: Reports: Patient, RN Notes Reviewed


History Limitations: Reports: No Limitations





- History of Present Illness


INITIAL COMMENTS - FREE TEXT/NARRATIVE: 





86-year-old gentleman presents emergency department day complaint of abdominal 

pain with distention, he states that abdominal pain over the last day or so but 

today his abdomen started getting more and more distended and he stopped passing

gas has some nausea significant history of multiple surgeries


  ** Abdomen


Pain Score (Numeric/FACES): 10





- Related Data


                                    Allergies











Allergy/AdvReac Type Severity Reaction Status Date / Time


 


No Known Allergies Allergy   Verified 04/09/21 08:18











Home Meds: 


                                    Home Meds





Aspirin 81 mg PO DAILY 05/31/14 [History]


Lisinopril/Hydrochlorothiazide [Lisinopril-Hctz 20-12.5 mg Tab] 1 each PO BID 

05/31/14 [History]


Metoprolol Succinate [Toprol XL 50mg] 1 tab PO DAILY 05/31/14 [History]


Nitroglycerin 0.4 mg SL ASDIRECTED PRN 05/31/14 [History]


dilTIAZem HCL [Diltiazem 12Hr ER] 90 mg PO BID 05/31/14 [History]


Hydrocodone/Acetaminophen [Hydrocodon-Acetaminophn ] 2 tab PO Q8H PRN 

10/23/19 [History]


LORazepam 0.5 mg PO Q8H PRN 10/23/19 [History]


Tamsulosin [Flomax] 0.4 mg PO BEDTIME #30 cap.er 06/26/20 [Rx]


Citalopram [Citalopram HBr] 10 mg PO DAILY 04/08/21 [History]


Pantoprazole [ProTONIX***] 40 mg PO DAILY 04/08/21 [History]


Simvastatin 80 mg PO BEDTIME 04/08/21 [History]











Past Medical History


HEENT History: Reports: Impaired Vision


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


Gastrointestinal History: Reports: Diverticulosis, GERD, Other (See Below)


Other Gastrointestinal History: bowel obstruction


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


Musculoskeletal History: Reports: Arthritis, Back Pain, Chronic


Psychiatric History: Reports: Anxiety, Depression


Other Oncologic History: skin


Other Dermatologic History: skin cancer





- Infectious Disease History


Infectious Disease History: Reports: Chicken Pox, Measles, Mumps





- Past Surgical History


Cardiovascular Surgical History: Reports: Valve Replacement, Other (See Below)


Other Cardiovascular Surgeries/Procedures: 2 open heart surgeries


GI Surgical History: Reports: Appendectomy, Cholecystectomy, Colonoscopy, EGD, 

Hernia Repair/Other


Male  Surgical History: Reports: None





Social & Family History





- Family History


Cardiac: Reports: CAD, MI


: Reports: Renal Disease/Insufficiency


Oncologic: Reports: Brain





- Tobacco Use


Tobacco Use Status *Q: Never Tobacco User





- Caffeine Use


Caffeine Use: Reports: Coffee


Caffeine Use Comment: rarely





- Recreational Drug Use


Recreational Drug Use: No





ED ROS GENERAL





- Review of Systems


Review Of Systems: See Below


Constitutional: Reports: No Symptoms


HEENT: Reports: No Symptoms


Respiratory: Reports: No Symptoms


Cardiovascular: Reports: No Symptoms


GI/Abdominal: Reports: Abdominal Pain, Distension.  Denies: Flatus


: Reports: No Symptoms





ED EXAM, GI/ABD





- Physical Exam


Exam: See Below


Exam Limited By: No Limitations


General Appearance: Alert, WD/WN, No Apparent Distress


Respiratory/Chest: No Respiratory Distress, Lungs Clear, Normal Breath Sounds, 

No Accessory Muscle Use, Chest Non-Tender


Cardiovascular: Regular Rate, Rhythm, No Murmur


GI/Abdominal Exam: Normal Bowel Sounds, Soft, Distended, Tender





Course





- Vital Signs


Last Recorded V/S: 


                                Last Vital Signs











Temp  97.2 F   05/03/21 21:04


 


Pulse  60   05/03/21 21:04


 


Resp  16   05/03/21 21:04


 


BP  142/56 H  05/03/21 21:04


 


Pulse Ox  96   05/03/21 21:04














- Orders/Labs/Meds


Orders: 


                               Active Orders 24 hr











 Category Date Time Status


 


 Peripheral IV Care [RC] .AS DIRECTED Care  05/03/21 21:16 Active


 


 Sodium Chloride 0.9% [Normal Saline] 1,000 ml Med  05/03/21 21:15 Active





 IV ASDIRECTED   


 


 Sodium Chloride 0.9% [Normal Saline] 70 ml Med  05/03/21 21:30 Active





 IV ASDIRECTED   


 


 Sodium Chloride 0.9% [Saline Flush] Med  05/03/21 21:15 Active





 10 ml FLUSH ASDIRECTED PRN   


 


 Peripheral IV Insertion Adult [OM.PC] Urgent Oth  05/03/21 21:15 Ordered








                                Medication Orders





Sodium Chloride (Normal Saline)  1,000 mls @ 125 mls/hr IV ASDIRECTED REGINO


   Last Admin: 05/03/21 21:22  Dose: 125 mls/hr


   Documented by: GAL


Sodium Chloride (Normal Saline)  70 mls @ 3.5 mls/sec IV ASDIRECTED REGINO


   Last Admin: 05/03/21 22:07  Dose: 3 mls/sec


   Documented by: YUMIKO


Sodium Chloride (Sodium Chloride 0.9% 10 Ml Syringe)  10 ml FLUSH ASDIRECTED PRN


   PRN Reason: Keep Vein Open


   Last Admin: 05/03/21 21:22  Dose: 10 ml


   Documented by: GAL








Labs: 


                                Laboratory Tests











  05/03/21 05/03/21 05/03/21 Range/Units





  21:30 21:30 21:30 


 


WBC  7.1    (4.5-11.0)  K/uL


 


RBC  3.16 L    (4.30-5.90)  M/uL


 


Hgb  9.2 L    (12.0-15.0)  g/dL


 


Hct  28.7 L    (40.0-54.0)  %


 


MCV  91    (80-98)  fL


 


MCH  29    (27-31)  pg


 


MCHC  32    (32-36)  %


 


Plt Count  247    (150-400)  K/uL


 


Neut % (Auto)  73 H    (36-66)  %


 


Lymph % (Auto)  11 L    (24-44)  %


 


Mono % (Auto)  12 H    (2-6)  %


 


Eos % (Auto)  5 H    (2-4)  %


 


Baso % (Auto)  0    (0-1)  %


 


Sodium   137 L   (140-148)  mmol/L


 


Potassium   4.1   (3.6-5.2)  mmol/L


 


Chloride   98 L   (100-108)  mmol/L


 


Carbon Dioxide   30   (21-32)  mmol/L


 


Anion Gap   13.1   (5.0-14.0)  mmol/L


 


BUN   16   (7-18)  mg/dL


 


Creatinine   1.3   (0.8-1.3)  mg/dL


 


Est Cr Clr Drug Dosing   36.37   mL/min


 


Estimated GFR (MDRD)   52 L   (>60)  


 


Glucose   141 H   ()  mg/dL


 


Lactic Acid    1.2  (0.4-2.0)  mmol/L


 


Calcium   8.9   (8.5-10.1)  mg/dL


 


Total Bilirubin   0.9   (0.2-1.0)  mg/dL


 


AST   17   (15-37)  U/L


 


ALT   17   (12-78)  U/L


 


Alkaline Phosphatase   103  D   ()  U/L


 


Total Protein   7.1   (6.4-8.2)  g/dL


 


Albumin   3.4   (3.4-5.0)  g/dL


 


Globulin   3.7 H   (2.3-3.5)  g/dL


 


Albumin/Globulin Ratio   0.9 L   (1.2-2.2)  


 


Lipase   68 L   ()  U/L











Meds: 


Medications











Generic Name Dose Route Start Last Admin





  Trade Name Freq  PRN Reason Stop Dose Admin


 


Sodium Chloride  1,000 mls @ 125 mls/hr  05/03/21 21:15  05/03/21 21:22





  Normal Saline  IV   125 mls/hr





  ASDIRECTED REGINO   Administration


 


Sodium Chloride  70 mls @ 3.5 mls/sec  05/03/21 21:30  05/03/21 22:07





  Normal Saline  IV   3 mls/sec





  ASDIRECTED REGINO   Administration


 


Sodium Chloride  10 ml  05/03/21 21:15  05/03/21 21:22





  Sodium Chloride 0.9% 10 Ml Syringe  FLUSH   10 ml





  ASDIRECTED PRN   Administration





  Keep Vein Open  














Discontinued Medications














Generic Name Dose Route Start Last Admin





  Trade Name Freq  PRN Reason Stop Dose Admin


 


Fentanyl  50 mcg  05/03/21 21:16  05/03/21 21:19





  Fentanyl 100 Mcg/2 Ml Sdv  IVPUSH  05/03/21 21:17  50 mcg





  ONETIME ONE   Administration


 


Iopamidol  94 ml  05/03/21 21:26  05/03/21 22:07





  Iopamidol 612 Mg/Ml 500 Ml Multipack Bottle  IV  05/03/21 21:27  94 ml





  ONETIME ONE   Administration


 


Ondansetron HCl  4 mg  05/03/21 21:16  05/03/21 21:21





  Ondansetron 4 Mg/2 Ml Sdv  IVPUSH  05/03/21 21:17  4 mg





  ONETIME ONE   Administration


 


Sodium Chloride  10 ml  05/03/21 21:26  05/03/21 22:07





  Sodium Chloride 0.9% 10 Ml Syringe  FLUSH  05/03/21 21:27  10 ml





  ONETIME ONE   Administration














Departure





- Departure


Time of Disposition: 22:58


Disposition: Admitted As Inpatient 66


Condition: Fair


Clinical Impression: 


 Partial small bowel obstruction








- Discharge Information





Sepsis Event Note (ED)





- Evaluation


Sepsis Screening Result: No Definite Risk





- Focused Exam


Vital Signs: 


                                   Vital Signs











  Temp Pulse Resp BP Pulse Ox


 


 05/03/21 21:04  97.2 F  60  16  142/56 H  96














- My Orders


Last 24 Hours: 


My Active Orders





05/03/21 21:15


Sodium Chloride 0.9% [Normal Saline] 1,000 ml IV ASDIRECTED 


Sodium Chloride 0.9% [Saline Flush]   10 ml FLUSH ASDIRECTED PRN 


Peripheral IV Insertion Adult [OM.PC] Urgent 





05/03/21 21:16


Peripheral IV Care [RC] .AS DIRECTED 





05/03/21 21:30


Sodium Chloride 0.9% [Normal Saline] 70 ml IV ASDIRECTED 














- Assessment/Plan


Last 24 Hours: 


My Active Orders





05/03/21 21:15


Sodium Chloride 0.9% [Normal Saline] 1,000 ml IV ASDIRECTED 


Sodium Chloride 0.9% [Saline Flush]   10 ml FLUSH ASDIRECTED PRN 


Peripheral IV Insertion Adult [OM.PC] Urgent 





05/03/21 21:16


Peripheral IV Care [RC] .AS DIRECTED 





05/03/21 21:30


Sodium Chloride 0.9% [Normal Saline] 70 ml IV ASDIRECTED 











Plan: 





Assessment





Acuity = acute





Site and laterality = partial small bowel obstruction





Etiology  = unknown





Manifestations = abdominal distention





Location of injury =  Home





Lab values = hemoglobin low at 9.7 consistent normochromic anemia CT scan 

describes small bowel obstruction above





Plan


Call discussed case with hospitalist on-call at [phone] can agreed to come evaluate

patient emergency department for admission

















 This note was dictated using dragon voice recognition software please call with

any questions on syntax or grammar.

## 2021-05-03 NOTE — PCM.HP.2
H&P History of Present Illness





- General


Date of Service: 05/03/21


Admit Problem/Dx: 


                           Admission Diagnosis/Problem





Admission Diagnosis/Problem      Small bowel obstruction








Source of Information: Patient, Provider, RN


History Limitations: Reports: No Limitations





- History of Present Illness


Initial Comments - Free Text/Narative: 





chief complaint: abdominal pain





This is a 86 male presents to the ER via EMS with a two day history of abdominal

pain and bloating. He reports diarrhea stools X3 this morning, urinating his 

usual amounts, last meal at 7 pm this evening- had "[employer]" steak, 

mashed potatoes, and string beans. no nausea or vomiting- just acute pain.





In ER CT abdomen-pelvis shows a partial small bowel obstruction. will plan to 

admit for further care and treatment. Mr. Rivera will consent to treatment to 

reduce bowel obstruction, declines any surgical intervention or heroic measure. 

If that happens request comfort cares only and to have a natural death.


Symptom Onset Date: 05/02/21


Duration of Symptoms: Reports: Day(s): (two), Getting Worse


Location: Reports: Abdomen (generalized abdominal pain)


Quality: Reports: Pressure, Same as Previous Episode, Sharp


Severity: Severe (rates pain at 8 out of 10)


Improves with: Reports: None


Worsens with: Reports: Eating (last meal today at 7 pm)


Context: Reports: Other (past history of small bowel obstruction. without 

surgical intervention)


Associated Symptoms: Reports: Other (abdominal pain).  Denies: Nausea/Vomiting 

(denies any nausea or vomiting)


  ** Abdomen


Pain Score (Numeric/FACES): 10





- Related Data


Allergies/Adverse Reactions: 


                                    Allergies











Allergy/AdvReac Type Severity Reaction Status Date / Time


 


No Known Allergies Allergy   Verified 04/09/21 08:18











Home Medications: 


                                    Home Meds





Aspirin 81 mg PO DAILY 05/31/14 [History]


Lisinopril/Hydrochlorothiazide [Lisinopril-Hctz 20-12.5 mg Tab] 1 each PO BID 

05/31/14 [History]


Metoprolol Succinate [Toprol XL 50mg] 1 tab PO DAILY 05/31/14 [History]


Nitroglycerin 0.4 mg SL ASDIRECTED PRN 05/31/14 [History]


dilTIAZem HCL [Diltiazem 12Hr ER] 90 mg PO BID 05/31/14 [History]


Hydrocodone/Acetaminophen [Hydrocodon-Acetaminophn ] 2 tab PO Q8H PRN 

10/23/19 [History]


LORazepam 0.5 mg PO Q8H PRN 10/23/19 [History]


Tamsulosin [Flomax] 0.4 mg PO BEDTIME #30 cap.er 06/26/20 [Rx]


Citalopram [Citalopram HBr] 10 mg PO DAILY 04/08/21 [History]


Pantoprazole [ProTONIX***] 40 mg PO DAILY 04/08/21 [History]


Simvastatin 80 mg PO BEDTIME 04/08/21 [History]











Past Medical History


HEENT History: Reports: Impaired Vision


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


Gastrointestinal History: Reports: Diverticulosis, GERD, Other (See Below)


Other Gastrointestinal History: bowel obstruction


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


Musculoskeletal History: Reports: Arthritis, Back Pain, Chronic


Psychiatric History: Reports: Anxiety, Depression


Other Oncologic History: skin


Other Dermatologic History: skin cancer





- Infectious Disease History


Infectious Disease History: Reports: Chicken Pox, Measles, Mumps





- Past Surgical History


Cardiovascular Surgical History: Reports: Valve Replacement, Other (See Below)


Other Cardiovascular Surgeries/Procedures: 2 open heart surgeries


GI Surgical History: Reports: Appendectomy, Cholecystectomy, Colonoscopy, EGD, 

Hernia Repair/Other


Male  Surgical History: Reports: None





Social & Family History





- Family History


Cardiac: Reports: CAD, MI


: Reports: Renal Disease/Insufficiency


Oncologic: Reports: Brain





- Tobacco Use


Tobacco Use Status *Q: Never Tobacco User





- Caffeine Use


Caffeine Use: Reports: Coffee


Caffeine Use Comment: rarely





- Recreational Drug Use


Recreational Drug Use: No





- Living Situation & Occupation


Living situation: Reports: 


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 

disabled, has one Son in [place], one Daughter in [place], [place].)





H&P Review of Systems





- Review of Systems:


Review Of Systems: See Below


General: Reports: Other (reports two days of abdominal pain, worse this 

evening.)


HEENT: Reports: Glasses, Other (natural teeth.)


Pulmonary: Reports: No Symptoms


Cardiovascular: Reports: No Symptoms


Gastrointestinal: Reports: Abdominal Pain, Diarrhea, Distension


Genitourinary: Reports: No Symptoms


Musculoskeletal: Reports: Leg Pain (chronic bilateral lower leg pain with edema,

 red, cracked, flaking of skin. ambulates with wheelchair or canes due to 

painful legs and feet), Other (chronic pain in lower legs due to severe dry 

skin)


Skin: Reports: Dryness, Other (bilateral lower legs with skin conditions causing

 pain and shedding of skin in dry flakes.)


Psychiatric: Reports: No Symptoms


Neurological: Reports: No Symptoms


Hematologic/Lymphatic: Reports: No Symptoms


Immunologic: Reports: No Symptoms





Exam





- Exam


Exam: See Below





- Vital Signs


Vital Signs: 


                                Last Vital Signs











Temp  97.2 F   05/03/21 21:04


 


Pulse  60   05/03/21 21:04


 


Resp  16   05/03/21 21:04


 


BP  142/56 H  05/03/21 21:04


 


Pulse Ox  96   05/03/21 21:04











Weight: 139 lb





- Exam


Quality Assessment: DVT Prophylaxis


General: Alert, Oriented, Cooperative, Other (frail appearing male with 

excellent recall, AOX3.)


HEENT: PERRLA, Conjunctiva Clear, Hearing Intact, Glasses


Neck: Supple, Trachea Midline


Lungs: Clear to Auscultation, Normal Respiratory Effort


Cardiovascular: Normal S1, Normal S2, Other ([place] present)


GI/Abdominal Exam: Distended (abdomen is firm, bowel sounds are present, 

generalized pain), Tender


 (Male) Exam: Deferred


Rectal (Males) Exam: Deferred


Extremities: Pedal Edema, Leg Pain (builateral chronic), Other (bilateral lower 

legs with flaking of skin, red, dry cracked. white flakes. edema present.)


Skin: Other (chronic skin condition of lower legs-red, cracked, flakes of skin, 

painful)


Neurological: Strength Equal Bilateral, Normal Speech, Normal Tone, Other 

(reports use of cane or wheelchair)


Neuro Extensive - Mental Status: Alert, Oriented x3, Normal Mood/Affect, Normal 

Cognition, Memory Intact


Psychiatric: Alert, Normal Affect, Normal Mood





- Patient Data


Lab Results Last 24 hrs: 


                         Laboratory Results - last 24 hr











  05/03/21 05/03/21 05/03/21 Range/Units





  21:30 21:30 21:30 


 


WBC  7.1    (4.5-11.0)  K/uL


 


RBC  3.16 L    (4.30-5.90)  M/uL


 


Hgb  9.2 L    (12.0-15.0)  g/dL


 


Hct  28.7 L    (40.0-54.0)  %


 


MCV  91    (80-98)  fL


 


MCH  29    (27-31)  pg


 


MCHC  32    (32-36)  %


 


Plt Count  247    (150-400)  K/uL


 


Neut % (Auto)  73 H    (36-66)  %


 


Lymph % (Auto)  11 L    (24-44)  %


 


Mono % (Auto)  12 H    (2-6)  %


 


Eos % (Auto)  5 H    (2-4)  %


 


Baso % (Auto)  0    (0-1)  %


 


Sodium   137 L   (140-148)  mmol/L


 


Potassium   4.1   (3.6-5.2)  mmol/L


 


Chloride   98 L   (100-108)  mmol/L


 


Carbon Dioxide   30   (21-32)  mmol/L


 


Anion Gap   13.1   (5.0-14.0)  mmol/L


 


BUN   16   (7-18)  mg/dL


 


Creatinine   1.3   (0.8-1.3)  mg/dL


 


Est Cr Clr Drug Dosing   36.37   mL/min


 


Estimated GFR (MDRD)   52 L   (>60)  


 


Glucose   141 H   ()  mg/dL


 


Lactic Acid    1.2  (0.4-2.0)  mmol/L


 


Calcium   8.9   (8.5-10.1)  mg/dL


 


Total Bilirubin   0.9   (0.2-1.0)  mg/dL


 


AST   17   (15-37)  U/L


 


ALT   17   (12-78)  U/L


 


Alkaline Phosphatase   103  D   ()  U/L


 


Total Protein   7.1   (6.4-8.2)  g/dL


 


Albumin   3.4   (3.4-5.0)  g/dL


 


Globulin   3.7 H   (2.3-3.5)  g/dL


 


Albumin/Globulin Ratio   0.9 L   (1.2-2.2)  


 


Lipase   68 L   ()  U/L











Result Diagrams: 


                                 05/03/21 21:30





                                 05/03/21 21:30





Sepsis Event Note





- Evaluation


Sepsis Screening Result: No Definite Risk





- Focused Exam


Vital Signs: 


                                   Vital Signs











  Temp Pulse Resp BP Pulse Ox


 


 05/03/21 21:04  97.2 F  60  16  142/56 H  96














- Problem List


(1) Partial small bowel obstruction


SNOMED Code(s): 216978128


   ICD Code: K56.600 - PARTIAL INTESTINAL OBSTRUCTION, UNSPECIFIED AS TO CAUSE  

 Status: Acute   Priority: High   Current Visit: Yes   





(2) CKD (chronic kidney disease) stage 3, GFR 30-59 ml/min


SNOMED Code(s): 440850572


   ICD Code: N18.30 - CHRONIC KIDNEY DISEASE, STAGE 3 UNSPECIFIED   Status: 

Acute   Priority: High   Current Visit: Yes   





(3) CAD (coronary artery disease)


SNOMED Code(s): 96213044


   ICD Code: I25.10 - ATHSCL HEART DISEASE OF NATIVE CORONARY ARTERY W/O ANG 

PCTRS   Status: Chronic   Priority: High   Current Visit: Yes   


Qualifiers: 


   Coronary Disease-Associated Artery/Lesion type: native artery   Native vs. 

transplanted heart: native heart   Associated angina: without angina   Qualified

 Code(s): I25.10 - Atherosclerotic heart disease of native coronary artery 

without angina pectoris   





(4) Dermatitis, atopic


SNOMED Code(s): 71939505


   ICD Code: L20.9 - ATOPIC DERMATITIS, UNSPECIFIED   Status: Acute   Priority: 

High   Current Visit: Yes   


Qualifiers: 


   Atopic dermatitis type: unspecified   Qualified Code(s): L20.9 - Atopic 

dermatitis, unspecified   


Problem List Initiated/Reviewed/Updated: Yes


Orders Last 24hrs: 


                               Active Orders 24 hr











 Category Date Time Status


 


 Patient Status Manage Transfer [TRANSFER] Routine ADT  05/03/21 22:28 Active


 


 Peripheral IV Care [RC] .AS DIRECTED Care  05/03/21 21:16 Active


 


 Abdomen Pelvis w Cont [CT] Urgent Exams  05/03/21 21:15 Taken


 


 Sodium Chloride 0.9% [Normal Saline] 1,000 ml Med  05/03/21 21:15 Active





 IV ASDIRECTED   


 


 Sodium Chloride 0.9% [Normal Saline] 70 ml Med  05/03/21 21:30 Active





 IV ASDIRECTED   


 


 Sodium Chloride 0.9% [Saline Flush] Med  05/03/21 21:15 Active





 10 ml FLUSH ASDIRECTED PRN   


 


 Peripheral IV Insertion Adult [OM.PC] Urgent Oth  05/03/21 21:15 Ordered


 


 Resuscitation Status Routine Resus Stat  05/03/21 22:30 Ordered








                                Medication Orders





Sodium Chloride (Normal Saline)  1,000 mls @ 125 mls/hr IV ASDIRECTED REGINO


   Last Admin: 05/03/21 21:22  Dose: 125 mls/hr


   Documented by: GAL


Sodium Chloride (Normal Saline)  70 mls @ 3.5 mls/sec IV ASDIRECTED REGINO


   Last Admin: 05/03/21 22:07  Dose: 3 mls/sec


   Documented by: YUMIKO


Sodium Chloride (Sodium Chloride 0.9% 10 Ml Syringe)  10 ml FLUSH ASDIRECTED PRN


   PRN Reason: Keep Vein Open


   Last Admin: 05/03/21 21:22  Dose: 10 ml


   Documented by: GAL








Assessment/Plan Comment:: 





ASSESSMENT AND PLAN OF CARE- PARTIAL SMALL BOWEL OBSTRUCTION





Partial Small bowel obstruction-CT scan show small bowel obstruction.  


-IV fluids- Normal Saline at 125ml/hr


-Pain and nausea management


-NG tube to intermittent suction


-patient declines any Surgical intervention if condition deteriorates- will 

request comfort cares. 


-Nothing by mouth





Essential Hypertension


-continue outpatient medications





CAD


-continue outpatient medications





CKD stage 3


-monitor I&O closely, adjust fluids as indicated.





Atopic dermatitis


-daily lotions and skin care.





Maintenance issues - 


- DVT prophylaxis  - SCD


- GI prophylaxis -PPI


- Nutrition -nothing by mouth


- Pastor catheter -not indicated





CODE STATUS -DNR/DNI





Admission justification - this patient will be admitted for inpatient services 

and is medically appropriate meeting medical necessity for inpatient admission 

as outlined in my documentation.  I reasonably expect the patient will require 

inpatient services that span a period time over 2 midnights. I reasonably expect

 this patient to be discharged or transferred within 96 hours [facility]





Disposition -I would anticipate discharge home after the hospital stay





Primary care physician - , [facility]





Hospitalist- Ovidio Gimenez M.D.





- Mortality Measure- good








- Mortality Measure


Prognosis:: Good
unable to assess

## 2021-05-04 RX ADMIN — DILTIAZEM HYDROCHLORIDE SCH MG: 180 CAPSULE, COATED, EXTENDED RELEASE ORAL at 08:56

## 2021-05-04 NOTE — PCM.PN
- General Info


Date of Service: 05/04/21


Subjective Update: 





No acute events overnight.  Minimal output from the NG tube.  He has a sore 

throat from the NG tube.  No significant abdominal pain since last night.  He is

passing gas.  He did have a bowel movement in the emergency room but none since.

 Abdomen is smaller today than yesterday and back to baseline.  We performed a 

clamping trial this afternoon and there was no significant output from the NG 

tube so it will be removed.


Functional Status: Reports: Pain Controlled





- Review of Systems


General: Denies: Fever


Gastrointestinal: Reports: Flatus.  Denies: Abdominal Pain, Nausea





- Patient Data


Vitals - Most Recent: 


                                Last Vital Signs











Temp  36.1 C   05/04/21 10:43


 


Pulse  62   05/04/21 10:43


 


Resp  18   05/04/21 10:43


 


BP  157/85 H  05/04/21 10:43


 


Pulse Ox  97   05/04/21 12:55











Weight - Most Recent: 64.41 kg


I&O - Last 24 Hours: 


                                 Intake & Output











 05/03/21 05/04/21 05/04/21





 22:59 06:59 14:59


 


Intake Total  676 


 


Output Total  950 400


 


Balance  -274 -400











Lab Results Last 24 Hours: 


                         Laboratory Results - last 24 hr











  05/03/21 05/03/21 05/03/21 Range/Units





  21:30 21:30 21:30 


 


WBC  7.1    (4.5-11.0)  K/uL


 


RBC  3.16 L    (4.30-5.90)  M/uL


 


Hgb  9.2 L    (12.0-15.0)  g/dL


 


Hct  28.7 L    (40.0-54.0)  %


 


MCV  91    (80-98)  fL


 


MCH  29    (27-31)  pg


 


MCHC  32    (32-36)  %


 


Plt Count  247    (150-400)  K/uL


 


Neut % (Auto)  73 H    (36-66)  %


 


Lymph % (Auto)  11 L    (24-44)  %


 


Mono % (Auto)  12 H    (2-6)  %


 


Eos % (Auto)  5 H    (2-4)  %


 


Baso % (Auto)  0    (0-1)  %


 


Sodium   137 L   (140-148)  mmol/L


 


Potassium   4.1   (3.6-5.2)  mmol/L


 


Chloride   98 L   (100-108)  mmol/L


 


Carbon Dioxide   30   (21-32)  mmol/L


 


Anion Gap   13.1   (5.0-14.0)  mmol/L


 


BUN   16   (7-18)  mg/dL


 


Creatinine   1.3   (0.8-1.3)  mg/dL


 


Est Cr Clr Drug Dosing   36.37   mL/min


 


Estimated GFR (MDRD)   52 L   (>60)  


 


Glucose   141 H   ()  mg/dL


 


Lactic Acid    1.2  (0.4-2.0)  mmol/L


 


Calcium   8.9   (8.5-10.1)  mg/dL


 


Total Bilirubin   0.9   (0.2-1.0)  mg/dL


 


AST   17   (15-37)  U/L


 


ALT   17   (12-78)  U/L


 


Alkaline Phosphatase   103  D   ()  U/L


 


Total Protein   7.1   (6.4-8.2)  g/dL


 


Albumin   3.4   (3.4-5.0)  g/dL


 


Globulin   3.7 H   (2.3-3.5)  g/dL


 


Albumin/Globulin Ratio   0.9 L   (1.2-2.2)  


 


Lipase   68 L   ()  U/L


 


Urine Color     (YELLOW)  


 


Urine Appearance     (CLEAR)  


 


Urine pH     (5.0-8.0)  


 


Ur Specific Gravity     (1.008-1.030)  


 


Urine Protein     (NEGATIVE)  mg/dL


 


Urine Glucose (UA)     (NEGATIVE)  mg/dL


 


Urine Ketones     (NEGATIVE)  mg/dL


 


Urine Occult Blood     (NEGATIVE)  


 


Urine Nitrite     (NEGATIVE)  


 


Urine Bilirubin     (NEGATIVE)  


 


Urine Urobilinogen     (0.2-1.0)  EU/dL


 


Ur Leukocyte Esterase     (NEGATIVE)  


 


Urine RBC     (0-5)  


 


Urine WBC     (0-5)  


 


Ur Epithelial Cells     


 


Amorphous Sediment     


 


Urine Bacteria     


 


Urine Mucus     














  05/04/21 05/04/21 05/04/21 Range/Units





  02:05 04:05 04:05 


 


WBC   5.1   (4.5-11.0)  K/uL


 


RBC   3.17 L   (4.30-5.90)  M/uL


 


Hgb   9.2 L   (12.0-15.0)  g/dL


 


Hct   28.9 L   (40.0-54.0)  %


 


MCV   91   (80-98)  fL


 


MCH   29   (27-31)  pg


 


MCHC   32   (32-36)  %


 


Plt Count   235   (150-400)  K/uL


 


Neut % (Auto)   67 H   (36-66)  %


 


Lymph % (Auto)   17 L   (24-44)  %


 


Mono % (Auto)   13 H   (2-6)  %


 


Eos % (Auto)   3   (2-4)  %


 


Baso % (Auto)   0   (0-1)  %


 


Sodium    140  (140-148)  mmol/L


 


Potassium    4.1  (3.6-5.2)  mmol/L


 


Chloride    102  (100-108)  mmol/L


 


Carbon Dioxide    29  (21-32)  mmol/L


 


Anion Gap    9.3  (5.0-14.0)  mmol/L


 


BUN    14  (7-18)  mg/dL


 


Creatinine    1.2  (0.8-1.3)  mg/dL


 


Est Cr Clr Drug Dosing    40.26  mL/min


 


Estimated GFR (MDRD)    57 L  (>60)  


 


Glucose    112 H  ()  mg/dL


 


Lactic Acid     (0.4-2.0)  mmol/L


 


Calcium    8.8  (8.5-10.1)  mg/dL


 


Total Bilirubin     (0.2-1.0)  mg/dL


 


AST     (15-37)  U/L


 


ALT     (12-78)  U/L


 


Alkaline Phosphatase     ()  U/L


 


Total Protein     (6.4-8.2)  g/dL


 


Albumin     (3.4-5.0)  g/dL


 


Globulin     (2.3-3.5)  g/dL


 


Albumin/Globulin Ratio     (1.2-2.2)  


 


Lipase     ()  U/L


 


Urine Color  Yellow    (YELLOW)  


 


Urine Appearance  Clear    (CLEAR)  


 


Urine pH  8.5 H    (5.0-8.0)  


 


Ur Specific Gravity  1.015    (1.008-1.030)  


 


Urine Protein  Trace H    (NEGATIVE)  mg/dL


 


Urine Glucose (UA)  Negative    (NEGATIVE)  mg/dL


 


Urine Ketones  Negative    (NEGATIVE)  mg/dL


 


Urine Occult Blood  Trace-intact H    (NEGATIVE)  


 


Urine Nitrite  Negative    (NEGATIVE)  


 


Urine Bilirubin  Negative    (NEGATIVE)  


 


Urine Urobilinogen  0.2    (0.2-1.0)  EU/dL


 


Ur Leukocyte Esterase  Negative    (NEGATIVE)  


 


Urine RBC  0-5    (0-5)  


 


Urine WBC  0-5    (0-5)  


 


Ur Epithelial Cells  Not seen    


 


Amorphous Sediment  Rare    


 


Urine Bacteria  Rare    


 


Urine Mucus  Not seen    











Med Orders - Current: 


                               Current Medications





Albuterol (Albuterol 0.083% 2.5 Mg/3 Ml Neb Soln)  2.5 mg NEB Q4H PRN


   PRN Reason: Shortness Of Breath/wheezing


Albuterol/Ipratropium (Albuterol/Ipratropium 3.0-0.5 Mg/3 Ml Neb Soln)  3 ml NEB

QID PRN


   PRN Reason: Shortness Of Breath/wheezing


Citalopram Hydrobromide (Citalopram 10 Mg Tab)  10 mg PO DAILY [facility]


   Last Admin: 05/04/21 08:56 Dose:  10 mg


   Documented by: 


Diltiazem HCl (Diltiazem 180 Mg Cap.Cd)  180 mg PO DAILY [facility]


   Last Admin: 05/04/21 08:56 Dose:  180 mg


   Documented by: 


Hydrochlorothiazide (Hydrochlorothiazide 12.5 Mg Cap)  12.5 mg PO BID [facility]


   Last Admin: 05/04/21 08:56 Dose:  12.5 mg


   Documented by: 


Sodium Chloride (Normal Saline)  1,000 mls @ 125 mls/hr IV ASDIRECTED [facility]


   Last Admin: 05/04/21 05:38 Dose:  125 mls/hr


   Documented by: 


Lisinopril (Lisinopril 20 Mg Tab)  20 mg PO BID [facility]


   Last Admin: 05/04/21 08:57 Dose:  20 mg


   Documented by: 


Lorazepam (Lorazepam 2 Mg/Ml Sdv)  1 mg IV Q6H PRN


   PRN Reason: Anxiety


   Last Admin: 05/04/21 01:13 Dose:  1 mg


   Documented by: 


Metoprolol Succinate (Metoprolol Succinate 50 Mg Tab.Er)  50 mg PO DAILY [facility]


   Last Admin: 05/04/21 08:56 Dose:  50 mg


   Documented by: 


Morphine Sulfate (Morphine 2 Mg/Ml Syringe)  2 mg IVPUSH Q2H PRN


   PRN Reason: Pain (severe 7-10)


   Last Admin: 05/04/21 01:13 Dose:  2 mg


   Documented by: 


Nitroglycerin (Nitroglycerin 0.4 Mg Tab.Sl)  0.4 mg SL ASDIRECTED PRN


   PRN Reason: Chest Pain


Ondansetron HCl (Ondansetron 4 Mg/2 Ml Sdv)  4 mg IV Q4H PRN


   PRN Reason: Nausea/Vomiting


Pantoprazole Sodium (Pantoprazole 40 Mg Vial)  40 mg IV DAILY [facility]


   Last Admin: 05/04/21 08:57 Dose:  40 mg


   Documented by: 


Sodium Chloride (Sodium Chloride 0.9% 10 Ml Syringe)  10 ml FLUSH ASDIRECTED PRN


   PRN Reason: Keep Vein Open


   Last Admin: 05/03/21 21:22 Dose:  10 ml


   Documented by: 


Tamsulosin HCl (Tamsulosin 0.4 Mg Cap.Er)  0.4 mg PO BEDTIME REGINO





Discontinued Medications





Fentanyl (Fentanyl 100 Mcg/2 Ml Sdv)  50 mcg IVPUSH ONETIME ONE


   Stop: 05/03/21 21:17


   Last Admin: 05/03/21 21:19 Dose:  50 mcg


   Documented by: 


Sodium Chloride (Normal Saline)  70 mls @ 3.5 mls/sec IV ASDIRECTED REGINO


   Last Admin: 05/03/21 22:07 Dose:  3 mls/sec


   Documented by: 


Iopamidol (Iopamidol 612 Mg/Ml 500 Ml Multipack Bottle)  94 ml IV ONETIME ONE


   Stop: 05/03/21 21:27


   Last Admin: 05/03/21 22:07 Dose:  94 ml


   Documented by: 


Lidocaine HCl (Lidocaine 2% Jelly 10 Ml Urojet)  10 ml MUCMEM ONETIME ONE


   Stop: 05/03/21 23:13


   Last Admin: 05/03/21 23:15 Dose:  10 ml


   Documented by: 


Lidocaine HCl (Lidocaine 2% Jelly 10 Ml Urojet) Confirm Administered Dose 10 ml 

.ROUTE .STK-MED ONE


   Stop: 05/03/21 23:15


   Last Admin: 05/04/21 01:44 Dose:  Not Given


   Documented by: 


Lisinopril (Lisinopril 10 Mg Tab) Confirm Administered Dose 20 mg .ROUTE .STK-

MED ONE


   Stop: 05/04/21 01:32


   Last Admin: 05/04/21 01:44 Dose:  Not Given


   Documented by: 


Non-Formulary Medication (Lisinopril/Hydrochlorothiazide [Lisinopril-Hctz 20-

12.5 Mg Tab])  1 each PO BID [facility]


   Last Admin: 05/04/21 02:48 Dose:  Not Given


   Documented by: 


Ondansetron HCl (Ondansetron 4 Mg/2 Ml Sdv)  4 mg IVPUSH ONETIME ONE


   Stop: 05/03/21 21:17


   Last Admin: 05/03/21 21:21 Dose:  4 mg


   Documented by: 


Sodium Chloride (Sodium Chloride 0.9% 10 Ml Syringe)  10 ml FLUSH ONETIME ONE


   Stop: 05/03/21 21:27


   Last Admin: 05/03/21 22:07 Dose:  10 ml


   Documented by: 











- Exam


Quality Assessment: No: Supplemental Oxygen


General: Alert, Oriented, Cooperative, No Acute Distress


HEENT: Other (NG tube left side)


Lungs: Normal Respiratory Effort


Cardiovascular: Regular Rate, Regular Rhythm


GI/Abdominal Exam: Normal Bowel Sounds, Soft, No Distention, Tender (mild 

suprepubic )


Extremities: No Pedal Edema.  No: Increased Warmth


Skin: Warm, Dry


Psy/Mental Status: Alert, Normal Affect





- Patient Data


Lab Results Last 24 hrs: 


                         Laboratory Results - last 24 hr











  05/03/21 05/03/21 05/03/21 Range/Units





  21:30 21:30 21:30 


 


WBC  7.1    (4.5-11.0)  K/uL


 


RBC  3.16 L    (4.30-5.90)  M/uL


 


Hgb  9.2 L    (12.0-15.0)  g/dL


 


Hct  28.7 L    (40.0-54.0)  %


 


MCV  91    (80-98)  fL


 


MCH  29    (27-31)  pg


 


MCHC  32    (32-36)  %


 


Plt Count  247    (150-400)  K/uL


 


Neut % (Auto)  73 H    (36-66)  %


 


Lymph % (Auto)  11 L    (24-44)  %


 


Mono % (Auto)  12 H    (2-6)  %


 


Eos % (Auto)  5 H    (2-4)  %


 


Baso % (Auto)  0    (0-1)  %


 


Sodium   137 L   (140-148)  mmol/L


 


Potassium   4.1   (3.6-5.2)  mmol/L


 


Chloride   98 L   (100-108)  mmol/L


 


Carbon Dioxide   30   (21-32)  mmol/L


 


Anion Gap   13.1   (5.0-14.0)  mmol/L


 


BUN   16   (7-18)  mg/dL


 


Creatinine   1.3   (0.8-1.3)  mg/dL


 


Est Cr Clr Drug Dosing   36.37   mL/min


 


Estimated GFR (MDRD)   52 L   (>60)  


 


Glucose   141 H   ()  mg/dL


 


Lactic Acid    1.2  (0.4-2.0)  mmol/L


 


Calcium   8.9   (8.5-10.1)  mg/dL


 


Total Bilirubin   0.9   (0.2-1.0)  mg/dL


 


AST   17   (15-37)  U/L


 


ALT   17   (12-78)  U/L


 


Alkaline Phosphatase   103  D   ()  U/L


 


Total Protein   7.1   (6.4-8.2)  g/dL


 


Albumin   3.4   (3.4-5.0)  g/dL


 


Globulin   3.7 H   (2.3-3.5)  g/dL


 


Albumin/Globulin Ratio   0.9 L   (1.2-2.2)  


 


Lipase   68 L   ()  U/L


 


Urine Color     (YELLOW)  


 


Urine Appearance     (CLEAR)  


 


Urine pH     (5.0-8.0)  


 


Ur Specific Gravity     (1.008-1.030)  


 


Urine Protein     (NEGATIVE)  mg/dL


 


Urine Glucose (UA)     (NEGATIVE)  mg/dL


 


Urine Ketones     (NEGATIVE)  mg/dL


 


Urine Occult Blood     (NEGATIVE)  


 


Urine Nitrite     (NEGATIVE)  


 


Urine Bilirubin     (NEGATIVE)  


 


Urine Urobilinogen     (0.2-1.0)  EU/dL


 


Ur Leukocyte Esterase     (NEGATIVE)  


 


Urine RBC     (0-5)  


 


Urine WBC     (0-5)  


 


Ur Epithelial Cells     


 


Amorphous Sediment     


 


Urine Bacteria     


 


Urine Mucus     














  05/04/21 05/04/21 05/04/21 Range/Units





  02:05 04:05 04:05 


 


WBC   5.1   (4.5-11.0)  K/uL


 


RBC   3.17 L   (4.30-5.90)  M/uL


 


Hgb   9.2 L   (12.0-15.0)  g/dL


 


Hct   28.9 L   (40.0-54.0)  %


 


MCV   91   (80-98)  fL


 


MCH   29   (27-31)  pg


 


MCHC   32   (32-36)  %


 


Plt Count   235   (150-400)  K/uL


 


Neut % (Auto)   67 H   (36-66)  %


 


Lymph % (Auto)   17 L   (24-44)  %


 


Mono % (Auto)   13 H   (2-6)  %


 


Eos % (Auto)   3   (2-4)  %


 


Baso % (Auto)   0   (0-1)  %


 


Sodium    140  (140-148)  mmol/L


 


Potassium    4.1  (3.6-5.2)  mmol/L


 


Chloride    102  (100-108)  mmol/L


 


Carbon Dioxide    29  (21-32)  mmol/L


 


Anion Gap    9.3  (5.0-14.0)  mmol/L


 


BUN    14  (7-18)  mg/dL


 


Creatinine    1.2  (0.8-1.3)  mg/dL


 


Est Cr Clr Drug Dosing    40.26  mL/min


 


Estimated GFR (MDRD)    57 L  (>60)  


 


Glucose    112 H  ()  mg/dL


 


Lactic Acid     (0.4-2.0)  mmol/L


 


Calcium    8.8  (8.5-10.1)  mg/dL


 


Total Bilirubin     (0.2-1.0)  mg/dL


 


AST     (15-37)  U/L


 


ALT     (12-78)  U/L


 


Alkaline Phosphatase     ()  U/L


 


Total Protein     (6.4-8.2)  g/dL


 


Albumin     (3.4-5.0)  g/dL


 


Globulin     (2.3-3.5)  g/dL


 


Albumin/Globulin Ratio     (1.2-2.2)  


 


Lipase     ()  U/L


 


Urine Color  Yellow    (YELLOW)  


 


Urine Appearance  Clear    (CLEAR)  


 


Urine pH  8.5 H    (5.0-8.0)  


 


Ur Specific Gravity  1.015    (1.008-1.030)  


 


Urine Protein  Trace H    (NEGATIVE)  mg/dL


 


Urine Glucose (UA)  Negative    (NEGATIVE)  mg/dL


 


Urine Ketones  Negative    (NEGATIVE)  mg/dL


 


Urine Occult Blood  Trace-intact H    (NEGATIVE)  


 


Urine Nitrite  Negative    (NEGATIVE)  


 


Urine Bilirubin  Negative    (NEGATIVE)  


 


Urine Urobilinogen  0.2    (0.2-1.0)  EU/dL


 


Ur Leukocyte Esterase  Negative    (NEGATIVE)  


 


Urine RBC  0-5    (0-5)  


 


Urine WBC  0-5    (0-5)  


 


Ur Epithelial Cells  Not seen    


 


Amorphous Sediment  Rare    


 


Urine Bacteria  Rare    


 


Urine Mucus  Not seen    











Result Diagrams: 


                                 05/04/21 04:05





                                 05/04/21 04:05





Sepsis Event Note





- Evaluation


Sepsis Screening Result: No Definite Risk





- Focused Exam


Vital Signs: 


                                   Vital Signs











  Temp Pulse Pulse Resp BP BP Pulse Ox


 


 05/04/21 12:55        97


 


 05/04/21 10:43  36.1 C   62  18   157/85 H  95


 


 05/04/21 08:57      145/51 H  


 


 05/04/21 08:56   71    145/51 H  


 


 05/04/21 07:29        95


 


 05/04/21 07:27        94 L


 


 05/04/21 07:06  35.9 C L   71  18   145/51 H  92 L


 


 05/04/21 04:00  35.7 C L   71  16   138/59 L  95


 


 05/04/21 01:55        95


 


 05/04/21 01:43      149/61 H  














- Problem List Review


Problem List Initiated/Reviewed/Updated: Yes





- My Orders


Last 24 Hours: 


My Active Orders





05/04/21 12:59


Communication Order [RC] ROUTINE 














- Plan


Plan:: 





ASSESSMENT AND PLAN -





Partial Small bowel obstruction-CT scan show small bowel obstruction.  

Intermittent issues over the last several months.  Improving with nonsurgical 

management.  No NG tube drainage.  Symptomatically much better today.  Passed 

clamping trial.


-Saline lock IV


-Remove NG tube


-Pain and nausea management


-Trial of clear liquids


-Dietary consult tomorrow for info about low residual/low fiber diet





Essential Hypertension-stable.


-continue outpatient medications





CAD-stable.


-continue outpatient medications





CKD stage 3-stable.


-monitor I&O closely, adjust fluids as indicated.





Atopic dermatitis


-daily lotions and skin care.





Maintenance issues - 


- DVT prophylaxis  - SCD


- GI prophylaxis -PPI


- Nutrition -nothing by mouth





Disposition -I would anticipate discharge home with home care after the hospital

 stay





Ovidio Gimenez M.D.

## 2021-05-04 NOTE — CR
CHEST: 2 view

 

CLINICAL HISTORY:NG tube

 

COMPARISON:CT 4/9/2021

 

FINDINGS:  Patient has an NG tube in the distal esophagus at the level of the GE

junction. There is gaseous distention of the stomach small bowel and colon.

Patient has had previous sternotomy. There is patchy density in both lung bases.

The there was some pneumonitis seen on the CT in April. This may represent some

residual pneumonitis. Some of this is chronic

 

IMPRESSION: NG tube in distal esophagus at the GE junction

 

Gaseous bowel distention

 

Patchy bibasal infiltrates

## 2021-05-05 VITALS — HEART RATE: 62 BPM

## 2021-05-05 VITALS — SYSTOLIC BLOOD PRESSURE: 145 MMHG | DIASTOLIC BLOOD PRESSURE: 53 MMHG

## 2021-05-05 RX ADMIN — DILTIAZEM HYDROCHLORIDE SCH MG: 180 CAPSULE, COATED, EXTENDED RELEASE ORAL at 08:33

## 2021-05-05 NOTE — [id number]
**Discharge Summary





- Hospital Course


Brief History: 86-year-old male with history of paroxysmal atrial fibrillation, 

coronary artery disease, previous partial bowel obstruction who presented with 

increasing abdominal pain and distention.  He was admitted for management of a 

partial small bowel obstruction.


Diagnosis: Stroke: No





- Discharge Data


Discharge Date: 05/05/21


Discharge Disposition: Home, W [facility] Health Agency [facility]


Condition: Fair





- Referral to Home Health


Date of Face to Face Encounter: 05/05/21


Reason for Homebound Status: partial SBO due to adhesions, weakness


Primary Care Physician: 


Shane Hinton MD





Skilled Need: Nursing and PT





- Discharge Diagnosis/Problem(s)


(1) Partial small bowel obstruction


SNOMED Code(s): 504105695


   ICD Code: K56.600 - PARTIAL INTESTINAL OBSTRUCTION, UNSPECIFIED AS TO CAUSE  

Status: Acute   Priority: High   





(2) Paroxysmal atrial fibrillation


SNOMED Code(s): 226599251


   ICD Code: I48.0 - PAROXYSMAL ATRIAL FIBRILLATION   Status: Chronic   





(3) CKD (chronic kidney disease) stage 3, GFR 30-59 ml/min


SNOMED Code(s): 937375843


   ICD Code: N18.30 - CHRONIC KIDNEY DISEASE, STAGE 3 UNSPECIFIED   Status: 

Chronic   Priority: High   


Qualifiers: 


   Chronic kidney disease stage 3 subtype: stage 3a (GFR 45-59)   Qualified 

Code(s): N18.31 - Chronic kidney disease, stage 3a   





(4) CAD (coronary artery disease)


SNOMED Code(s): 85618328


   ICD Code: I25.10 - ATHSCL HEART DISEASE OF NATIVE CORONARY ARTERY W/O ANG 

PCTRS   Status: Chronic   Priority: High   


Qualifiers: 


   Coronary Disease-Associated Artery/Lesion type: native artery   Native vs. 

transplanted heart: native heart   Associated angina: without angina   Qualified

Code(s): I25.10 - Atherosclerotic heart disease of native coronary artery 

without angina pectoris   





- Patient Summary/Data


Consults: 


                                  Consultations





05/04/21 15:59


Consult to Dietary [Consult to Dietician] [CONS] Routine 


   Comment: 


   Physician Instructions: 


   Quantity: 


   Reason for Consult: GI low residual/low fiber diet info











Hospital Course: 





Al presented to the emergency room with progressive abdominal pain and nausea.  

Work-up in the emergency room revealed evidence for dehydration as well as at 

least a partial small bowel obstruction with probable transition point in the 

right lower abdomen.  The cause of obstruction was thought to be a stricture.  

An NG tube was placed and he was admitted to the hospital for additional 

management.  He received symptomatic management of both pain and nausea.  

Fortunately shortly after admission he started to have bowel movements and pass 

gas.  This continued through the night.  The next day we performed a clamping 

trial and there was no significant output from the NG tube during that time.  

The NG tube was removed and he was advanced to clear liquids.  He tolerated the 

clear liquids well and did not have any additional pain since the early portion 

of the hospital stay.  On the day of discharge we tried a GI soft/low residual 

diet and he tolerated this well.  We did have dietary come see him regarding 

good food options with the suspected stricture causing a partial and 

intermittent obstruction.  We did talk about potential for surgical 

intervention.  At this point the patient feels like he is too old and not in 

good enough shape to survive a bowel surgery and would like to try to manage 

this with his diet.  He is stable and safe for discharge at this time.  He would

 benefit from early follow-up.  He was interested in home care and this was 

arranged.





- Patient Instructions


Diet: GI Soft/Low Residue/Low Fiber


Activity: As Tolerated


Showering/Bathing: May Shower


Notify Provider of: Increased Pain, Nausea and/or Vomiting


Other/Special Instructions: 1. You were in the hospital for management of a 

partial small bowel obstruction probably related to old postoperative adhesions.

  Your condition has improved with nonsurgical management including NG tube 

decompression and IV fluid hydration.  I suspect that this condition will remain

 and could remained stable or could potentially get worse.  To help reduce the 

risk of future events I recommend a diet that is soft and has low fiber.  We 

have provided information and there is additional information available from the

 dietitian.  2. Continue your usual home medications as previously prescribed.  

3. I have placed a referral to home health care.  They will provide nursing and 

physical therapy services to help ease your transition home after the hospital 

stay.





- Discharge Plan


*PRESCRIPTION DRUG MONITORING PROGRAM REVIEWED*: Not Applicable


*COPY OF PRESCRIPTION DRUG MONITORING REPORT IN PATIENT [facility]: Not Applicable


Home Medications: 


                                    Home Meds





Aspirin 81 mg PO DAILY 05/31/14 [History]


Lisinopril/Hydrochlorothiazide [Lisinopril-Hctz 20-12.5 mg Tab] 1 each PO BID 

05/31/14 [History]


Metoprolol Succinate [Toprol XL 50mg] 1 tab PO DAILY 05/31/14 [History]


Nitroglycerin 0.4 mg SL ASDIRECTED PRN 05/31/14 [History]


dilTIAZem HCL [Diltiazem 12Hr ER] 90 mg PO BID 05/31/14 [History]


Hydrocodone/Acetaminophen [Hydrocodon-Acetaminophn ] 2 tab PO Q8H PRN 

10/23/19 [History]


LORazepam 0.5 mg PO Q8H PRN 10/23/19 [History]


Tamsulosin [Flomax] 0.4 mg PO BEDTIME #30 cap.er 06/26/20 [Rx]


Citalopram [Citalopram HBr] 10 mg PO DAILY 04/08/21 [History]


Pantoprazole [ProTONIX***] 40 mg PO DAILY 04/08/21 [History]


Simvastatin 80 mg PO BEDTIME 04/08/21 [History]








Patient Handouts:  Soft-Food Eating Plan, Bowel Obstruction


Referrals: 


Shane Hinton MD [Primary Care Provider] - 05/12/21 1:30 pm





- Discharge Summary/Plan Comment


DC Time >30 min.: Yes (35-set up home care, diet info )





- Patient Data


Vitals - Most Recent: 


                                Last Vital Signs











Temp  35.9 C L  05/05/21 09:00


 


Pulse  62   05/05/21 09:00


 


Resp  18   05/05/21 09:00


 


BP  145/53 H  05/05/21 09:00


 


Pulse Ox  97   05/05/21 09:00











Weight - Most Recent: 64.41 kg


I&O - Last 24 hours: 


                                 Intake & Output











 05/04/21 05/05/21 05/05/21





 22:59 06:59 14:59


 


Intake Total 520  480


 


Output Total 350  300


 


Balance 170  180











Med Orders - Current: 


                               Current Medications





Hydrocodone Bitart/Acetaminophen (Acetaminophen/Hydrocodone 325-10 Mg Tab)  1 - 

2 tab PO Q6H PRN


   PRN Reason: Pain


   Last Admin: 05/05/21 05:54 Dose:  2 tab


   Documented by: 


Albuterol (Albuterol 0.083% 2.5 Mg/3 Ml Neb Soln)  2.5 mg NEB Q4H PRN


   PRN Reason: Shortness Of Breath/wheezing


Albuterol/Ipratropium (Albuterol/Ipratropium 3.0-0.5 Mg/3 Ml Neb Soln)  3 ml NEB

QID PRN


   PRN Reason: Shortness Of Breath/wheezing


Citalopram Hydrobromide (Citalopram 10 Mg Tab)  10 mg PO DAILY [facility]


   Last Admin: 05/05/21 08:43 Dose:  10 mg


   Documented by: 


Diltiazem HCl (Diltiazem 180 Mg Cap.Cd)  180 mg PO DAILY [facility]


   Last Admin: 05/05/21 08:33 Dose:  180 mg


   Documented by: 


Hydrochlorothiazide (Hydrochlorothiazide 12.5 Mg Cap)  12.5 mg PO BID [facility]


   Last Admin: 05/05/21 08:33 Dose:  12.5 mg


   Documented by: 


Lisinopril (Lisinopril 20 Mg Tab)  20 mg PO BID [facility]


   Last Admin: 05/05/21 08:33 Dose:  20 mg


   Documented by: 


Lorazepam (Lorazepam 2 Mg/Ml Sdv)  1 mg IV Q6H PRN


   PRN Reason: Anxiety


   Last Admin: 05/04/21 01:13 Dose:  1 mg


   Documented by: 


Metoprolol Succinate (Metoprolol Succinate 50 Mg Tab.Er)  50 mg PO DAILY [facility]


   Last Admin: 05/05/21 08:33 Dose:  50 mg


   Documented by: 


Morphine Sulfate (Morphine 2 Mg/Ml Syringe)  2 mg IVPUSH Q2H PRN


   PRN Reason: Pain (severe 7-10)


   Last Admin: 05/04/21 01:13 Dose:  2 mg


   Documented by: 


Nitroglycerin (Nitroglycerin 0.4 Mg Tab.Sl)  0.4 mg SL ASDIRECTED PRN


   PRN Reason: Chest Pain


Ondansetron HCl (Ondansetron 4 Mg/2 Ml Sdv)  4 mg IV Q4H PRN


   PRN Reason: Nausea/Vomiting


Pantoprazole Sodium (Pantoprazole 40 Mg Tab.Cr)  40 mg PO ACBREAKFAST [facility]


   Last Admin: 05/05/21 08:33 Dose:  40 mg


   Documented by: 


Sodium Chloride (Sodium Chloride 0.9% 10 Ml Syringe)  10 ml FLUSH ASDIRECTED PRN


   PRN Reason: Keep Vein Open


   Last Admin: 05/03/21 21:22 Dose:  10 ml


   Documented by: 


Tamsulosin HCl (Tamsulosin 0.4 Mg Cap.Er)  0.4 mg PO BEDTIME [facility]


   Last Admin: 05/04/21 21:10 Dose:  0.4 mg


   Documented by: 





Discontinued Medications





Fentanyl (Fentanyl 100 Mcg/2 Ml Sdv)  50 mcg IVPUSH ONETIME ONE


   Stop: 05/03/21 21:17


   Last Admin: 05/03/21 21:19 Dose:  50 mcg


   Documented by: 


Sodium Chloride (Normal Saline)  1,000 mls @ 125 mls/hr IV ASDIRECTED [facility]


   Last Admin: 05/04/21 05:38 Dose:  125 mls/hr


   Documented by: 


Sodium Chloride (Normal Saline)  70 mls @ 3.5 mls/sec IV ASDIRECTED [facility]


   Last Admin: 05/03/21 22:07 Dose:  3 mls/sec


   Documented by: 


Iopamidol (Iopamidol 612 Mg/Ml 500 Ml Multipack Bottle)  94 ml IV ONETIME ONE


   Stop: 05/03/21 21:27


   Last Admin: 05/03/21 22:07 Dose:  94 ml


   Documented by: 


Lidocaine HCl (Lidocaine 2% Jelly 10 Ml Urojet)  10 ml MUCMEM ONETIME ONE


   Stop: 05/03/21 23:13


   Last Admin: 05/03/21 23:15 Dose:  10 ml


   Documented by: 


Lidocaine HCl (Lidocaine 2% Jelly 10 Ml Urojet) Confirm Administered Dose 10 ml 

.ROUTE .STK-MED ONE


   Stop: 05/03/21 23:15


   Last Admin: 05/04/21 01:44 Dose:  Not Given


   Documented by: 


Lisinopril (Lisinopril 10 Mg Tab) Confirm Administered Dose 20 mg .ROUTE .STK-

MED ONE


   Stop: 05/04/21 01:32


   Last Admin: 05/04/21 01:44 Dose:  Not Given


   Documented by: 


Non-Formulary Medication (Lisinopril/Hydrochlorothiazide [Lisinopril-Hctz 20-

12.5 Mg Tab])  1 each PO BID [facility]


   Last Admin: 05/04/21 02:48 Dose:  Not Given


   Documented by: 


Ondansetron HCl (Ondansetron 4 Mg/2 Ml Sdv)  4 mg IVPUSH ONETIME ONE


   Stop: 05/03/21 21:17


   Last Admin: 05/03/21 21:21 Dose:  4 mg


   Documented by: 


Pantoprazole Sodium (Pantoprazole 40 Mg Vial)  40 mg IV DAILY [facility]


   Last Admin: 05/04/21 08:57 Dose:  40 mg


   Documented by: 


Sodium Chloride (Sodium Chloride 0.9% 10 Ml Syringe)  10 ml FLUSH ONETIME ONE


   Stop: 05/03/21 21:27


   Last Admin: 05/03/21 22:07 Dose:  10 ml


   Documented by:

## 2022-05-29 ENCOUNTER — HOSPITAL ENCOUNTER (EMERGENCY)
Dept: HOSPITAL 11 - JP.ED | Age: 87
Discharge: HOME | End: 2022-05-29
Payer: MEDICARE

## 2022-05-29 VITALS — DIASTOLIC BLOOD PRESSURE: 67 MMHG | SYSTOLIC BLOOD PRESSURE: 168 MMHG | HEART RATE: 69 BPM

## 2022-05-29 DIAGNOSIS — K59.01: Primary | ICD-10-CM

## 2022-05-29 DIAGNOSIS — I25.10: ICD-10-CM

## 2022-05-29 DIAGNOSIS — I10: ICD-10-CM

## 2022-05-29 DIAGNOSIS — Z79.899: ICD-10-CM

## 2022-05-29 DIAGNOSIS — K21.9: ICD-10-CM

## 2022-05-29 DIAGNOSIS — Z79.82: ICD-10-CM

## 2022-05-29 DIAGNOSIS — Z90.49: ICD-10-CM

## 2022-05-29 DIAGNOSIS — R33.9: ICD-10-CM

## 2022-10-06 ENCOUNTER — HOSPITAL ENCOUNTER (EMERGENCY)
Dept: HOSPITAL 11 - JP.ED | Age: 87
Discharge: HOME | End: 2022-10-06
Payer: MEDICARE

## 2022-10-06 DIAGNOSIS — R53.1: Primary | ICD-10-CM

## 2022-10-06 DIAGNOSIS — R60.0: ICD-10-CM

## 2022-10-06 DIAGNOSIS — Z20.822: ICD-10-CM

## 2022-10-06 PROCEDURE — U0002 COVID-19 LAB TEST NON-CDC: HCPCS

## 2022-10-06 PROCEDURE — 85027 COMPLETE CBC AUTOMATED: CPT

## 2022-10-06 PROCEDURE — 99284 EMERGENCY DEPT VISIT MOD MDM: CPT

## 2022-10-06 PROCEDURE — 36415 COLL VENOUS BLD VENIPUNCTURE: CPT

## 2022-10-06 PROCEDURE — 80048 BASIC METABOLIC PNL TOTAL CA: CPT

## 2022-12-10 ENCOUNTER — HOSPITAL ENCOUNTER (EMERGENCY)
Dept: HOSPITAL 11 - JP.ED | Age: 87
Discharge: HOME | End: 2022-12-10
Payer: MEDICARE

## 2022-12-10 VITALS — SYSTOLIC BLOOD PRESSURE: 111 MMHG | HEART RATE: 61 BPM | DIASTOLIC BLOOD PRESSURE: 46 MMHG

## 2022-12-10 DIAGNOSIS — I25.10: ICD-10-CM

## 2022-12-10 DIAGNOSIS — S01.01XA: Primary | ICD-10-CM

## 2022-12-10 DIAGNOSIS — Z79.82: ICD-10-CM

## 2022-12-10 DIAGNOSIS — Z23: ICD-10-CM

## 2022-12-10 DIAGNOSIS — Z79.899: ICD-10-CM

## 2022-12-10 DIAGNOSIS — Z95.1: ICD-10-CM
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
## 2022-12-10 DIAGNOSIS — I10: ICD-10-CM

## 2022-12-16 ENCOUNTER — HOSPITAL ENCOUNTER (EMERGENCY)
Dept: HOSPITAL 11 - JP.ED | Age: 87
Discharge: HOME | End: 2022-12-16
Payer: MEDICARE

## 2022-12-16 VITALS — HEART RATE: 69 BPM | DIASTOLIC BLOOD PRESSURE: 63 MMHG | SYSTOLIC BLOOD PRESSURE: 165 MMHG

## 2022-12-16 DIAGNOSIS — Z90.49: ICD-10-CM

## 2022-12-16 DIAGNOSIS — I25.10: ICD-10-CM

## 2022-12-16 DIAGNOSIS — I10: ICD-10-CM

## 2022-12-16 DIAGNOSIS — Z20.822: ICD-10-CM

## 2022-12-16 DIAGNOSIS — Z79.899: ICD-10-CM

## 2022-12-16 DIAGNOSIS — Z79.82: ICD-10-CM

## 2022-12-16 DIAGNOSIS — R53.1: Primary | ICD-10-CM

## 2022-12-16 LAB — SARS-COV-2 RNA RESP QL NAA+PROBE: NEGATIVE

## 2022-12-16 PROCEDURE — 83605 ASSAY OF LACTIC ACID: CPT

## 2022-12-16 PROCEDURE — 99285 EMERGENCY DEPT VISIT HI MDM: CPT

## 2022-12-16 PROCEDURE — 84484 ASSAY OF TROPONIN QUANT: CPT

## 2022-12-16 PROCEDURE — 0241U: CPT

## 2022-12-16 PROCEDURE — 71046 X-RAY EXAM CHEST 2 VIEWS: CPT

## 2022-12-16 PROCEDURE — 80053 COMPREHEN METABOLIC PANEL: CPT

## 2022-12-16 PROCEDURE — 85025 COMPLETE CBC W/AUTO DIFF WBC: CPT

## 2022-12-16 PROCEDURE — 36415 COLL VENOUS BLD VENIPUNCTURE: CPT

## 2023-04-04 ENCOUNTER — HOSPITAL ENCOUNTER (INPATIENT)
Dept: HOSPITAL 11 - JP.ED | Age: 88
LOS: 6 days | Discharge: HOME HEALTH | DRG: 689 | End: 2023-04-10
Attending: INTERNAL MEDICINE | Admitting: INTERNAL MEDICINE
Payer: MEDICARE

## 2023-04-04 DIAGNOSIS — K21.9: ICD-10-CM

## 2023-04-04 DIAGNOSIS — Z79.82: ICD-10-CM

## 2023-04-04 DIAGNOSIS — I10: ICD-10-CM

## 2023-04-04 DIAGNOSIS — Z95.5: ICD-10-CM

## 2023-04-04 DIAGNOSIS — N18.32: ICD-10-CM

## 2023-04-04 DIAGNOSIS — M54.9: ICD-10-CM

## 2023-04-04 DIAGNOSIS — I48.0: ICD-10-CM

## 2023-04-04 DIAGNOSIS — M19.90: ICD-10-CM

## 2023-04-04 DIAGNOSIS — Z79.899: ICD-10-CM

## 2023-04-04 DIAGNOSIS — Z87.891: ICD-10-CM

## 2023-04-04 DIAGNOSIS — Z98.890: ICD-10-CM

## 2023-04-04 DIAGNOSIS — G89.29: ICD-10-CM

## 2023-04-04 DIAGNOSIS — F41.9: ICD-10-CM

## 2023-04-04 DIAGNOSIS — I25.10: ICD-10-CM

## 2023-04-04 DIAGNOSIS — Z95.1: ICD-10-CM

## 2023-04-04 DIAGNOSIS — B95.2: ICD-10-CM

## 2023-04-04 DIAGNOSIS — E86.0: ICD-10-CM

## 2023-04-04 DIAGNOSIS — N40.1: ICD-10-CM

## 2023-04-04 DIAGNOSIS — F32.A: ICD-10-CM

## 2023-04-04 DIAGNOSIS — N39.0: Primary | ICD-10-CM

## 2023-04-04 DIAGNOSIS — N30.00: ICD-10-CM

## 2023-04-04 DIAGNOSIS — U07.1: ICD-10-CM

## 2023-04-04 DIAGNOSIS — Z90.49: ICD-10-CM

## 2023-04-04 DIAGNOSIS — I12.9: ICD-10-CM

## 2023-04-04 LAB
SARS-COV-2 RNA RESP QL NAA+PROBE: POSITIVE
TROPONIN I SERPL HS-MCNC: 15.8 PG/ML (ref ?–60.3)

## 2023-04-04 PROCEDURE — 97165 OT EVAL LOW COMPLEX 30 MIN: CPT

## 2023-04-04 PROCEDURE — 36415 COLL VENOUS BLD VENIPUNCTURE: CPT

## 2023-04-04 PROCEDURE — 97535 SELF CARE MNGMENT TRAINING: CPT

## 2023-04-04 PROCEDURE — 97110 THERAPEUTIC EXERCISES: CPT

## 2023-04-04 PROCEDURE — 0241U: CPT

## 2023-04-04 PROCEDURE — 74018 RADEX ABDOMEN 1 VIEW: CPT

## 2023-04-04 PROCEDURE — 84484 ASSAY OF TROPONIN QUANT: CPT

## 2023-04-04 PROCEDURE — 97530 THERAPEUTIC ACTIVITIES: CPT

## 2023-04-04 PROCEDURE — 85025 COMPLETE CBC W/AUTO DIFF WBC: CPT

## 2023-04-04 PROCEDURE — 85027 COMPLETE CBC AUTOMATED: CPT

## 2023-04-04 PROCEDURE — 81001 URINALYSIS AUTO W/SCOPE: CPT

## 2023-04-04 PROCEDURE — 99284 EMERGENCY DEPT VISIT MOD MDM: CPT

## 2023-04-04 PROCEDURE — 87493 C DIFF AMPLIFIED PROBE: CPT

## 2023-04-04 PROCEDURE — 8E0ZXY6 ISOLATION: ICD-10-PCS | Performed by: INTERNAL MEDICINE

## 2023-04-04 PROCEDURE — 80053 COMPREHEN METABOLIC PANEL: CPT

## 2023-04-04 PROCEDURE — 83605 ASSAY OF LACTIC ACID: CPT

## 2023-04-04 PROCEDURE — 96365 THER/PROPH/DIAG IV INF INIT: CPT

## 2023-04-04 PROCEDURE — 96375 TX/PRO/DX INJ NEW DRUG ADDON: CPT

## 2023-04-04 PROCEDURE — 93005 ELECTROCARDIOGRAM TRACING: CPT

## 2023-04-04 PROCEDURE — 80048 BASIC METABOLIC PNL TOTAL CA: CPT

## 2023-04-04 PROCEDURE — 96361 HYDRATE IV INFUSION ADD-ON: CPT

## 2023-04-04 PROCEDURE — 99285 EMERGENCY DEPT VISIT HI MDM: CPT

## 2023-04-04 PROCEDURE — 87086 URINE CULTURE/COLONY COUNT: CPT

## 2023-04-04 PROCEDURE — 87186 SC STD MICRODIL/AGAR DIL: CPT

## 2023-04-04 PROCEDURE — 97162 PT EVAL MOD COMPLEX 30 MIN: CPT

## 2023-04-04 PROCEDURE — 87088 URINE BACTERIA CULTURE: CPT

## 2023-04-04 RX ADMIN — Medication SCH CAP: at 21:17

## 2023-04-05 RX ADMIN — GUAIFENESIN AND DEXTROMETHORPHAN PRN ML: 100; 10 SYRUP ORAL at 21:04

## 2023-04-05 RX ADMIN — SIMETHICONE PRN MG: 125 TABLET, CHEWABLE ORAL at 15:46

## 2023-04-05 RX ADMIN — Medication SCH CAP: at 09:05

## 2023-04-05 RX ADMIN — Medication SCH CAP: at 20:20

## 2023-04-06 RX ADMIN — GUAIFENESIN AND DEXTROMETHORPHAN PRN ML: 100; 10 SYRUP ORAL at 21:09

## 2023-04-06 RX ADMIN — Medication SCH CAP: at 08:27

## 2023-04-06 RX ADMIN — SIMETHICONE PRN MG: 125 TABLET, CHEWABLE ORAL at 18:32

## 2023-04-06 RX ADMIN — Medication SCH CAP: at 20:51

## 2023-04-06 RX ADMIN — SIMETHICONE PRN MG: 125 TABLET, CHEWABLE ORAL at 08:44

## 2023-04-07 RX ADMIN — AMOXICILLIN AND CLAVULANATE POTASSIUM SCH TAB: 500; 125 TABLET, FILM COATED ORAL at 20:58

## 2023-04-07 RX ADMIN — Medication SCH CAP: at 08:31

## 2023-04-07 RX ADMIN — SIMETHICONE PRN MG: 125 TABLET, CHEWABLE ORAL at 14:11

## 2023-04-07 RX ADMIN — BENZOCAINE AND MENTHOL PRN LOZ: 15; 3.6 LOZENGE ORAL at 21:00

## 2023-04-07 RX ADMIN — AMOXICILLIN AND CLAVULANATE POTASSIUM SCH TAB: 500; 125 TABLET, FILM COATED ORAL at 10:02

## 2023-04-07 RX ADMIN — Medication SCH CAP: at 20:58

## 2023-04-08 RX ADMIN — Medication SCH CAP: at 08:08

## 2023-04-08 RX ADMIN — Medication SCH CAP: at 20:28

## 2023-04-08 RX ADMIN — AMOXICILLIN AND CLAVULANATE POTASSIUM SCH TAB: 500; 125 TABLET, FILM COATED ORAL at 20:28

## 2023-04-08 RX ADMIN — BENZOCAINE AND MENTHOL PRN LOZ: 15; 3.6 LOZENGE ORAL at 09:29

## 2023-04-08 RX ADMIN — AMOXICILLIN AND CLAVULANATE POTASSIUM SCH TAB: 500; 125 TABLET, FILM COATED ORAL at 08:08

## 2023-04-08 RX ADMIN — DILTIAZEM HYDROCHLORIDE SCH MG: 180 CAPSULE, COATED, EXTENDED RELEASE ORAL at 14:36

## 2023-04-09 RX ADMIN — DILTIAZEM HYDROCHLORIDE SCH MG: 180 CAPSULE, COATED, EXTENDED RELEASE ORAL at 08:49

## 2023-04-09 RX ADMIN — Medication SCH CAP: at 08:50

## 2023-04-09 RX ADMIN — Medication SCH CAP: at 20:15

## 2023-04-09 RX ADMIN — AMOXICILLIN AND CLAVULANATE POTASSIUM SCH TAB: 500; 125 TABLET, FILM COATED ORAL at 20:15

## 2023-04-09 RX ADMIN — AMOXICILLIN AND CLAVULANATE POTASSIUM SCH TAB: 500; 125 TABLET, FILM COATED ORAL at 08:49

## 2023-04-10 VITALS — DIASTOLIC BLOOD PRESSURE: 41 MMHG | SYSTOLIC BLOOD PRESSURE: 117 MMHG | HEART RATE: 75 BPM

## 2023-04-10 RX ADMIN — Medication SCH CAP: at 08:41

## 2023-04-10 RX ADMIN — AMOXICILLIN AND CLAVULANATE POTASSIUM SCH TAB: 500; 125 TABLET, FILM COATED ORAL at 08:43

## 2023-04-10 RX ADMIN — DILTIAZEM HYDROCHLORIDE SCH MG: 180 CAPSULE, COATED, EXTENDED RELEASE ORAL at 08:41

## 2023-04-14 ENCOUNTER — HOSPITAL ENCOUNTER (EMERGENCY)
Dept: HOSPITAL 11 - JP.ED | Age: 88
Discharge: HOME | End: 2023-04-14
Payer: MEDICARE

## 2023-04-14 VITALS — SYSTOLIC BLOOD PRESSURE: 129 MMHG | HEART RATE: 77 BPM | DIASTOLIC BLOOD PRESSURE: 43 MMHG

## 2023-04-14 DIAGNOSIS — R10.9: ICD-10-CM

## 2023-04-14 DIAGNOSIS — I25.10: ICD-10-CM

## 2023-04-14 DIAGNOSIS — Z79.899: ICD-10-CM

## 2023-04-14 DIAGNOSIS — R29.6: ICD-10-CM

## 2023-04-14 DIAGNOSIS — Z95.1: ICD-10-CM

## 2023-04-14 DIAGNOSIS — Z23: ICD-10-CM

## 2023-04-14 DIAGNOSIS — S00.01XA: ICD-10-CM

## 2023-04-14 DIAGNOSIS — S40.012A: ICD-10-CM
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
## 2023-04-14 DIAGNOSIS — G89.29: ICD-10-CM

## 2023-04-14 DIAGNOSIS — Z79.82: ICD-10-CM

## 2023-04-14 DIAGNOSIS — S61.412A: Primary | ICD-10-CM

## 2023-04-14 DIAGNOSIS — I10: ICD-10-CM

## 2023-04-14 DIAGNOSIS — S61.411A: ICD-10-CM

## 2023-06-17 ENCOUNTER — HOSPITAL ENCOUNTER (EMERGENCY)
Dept: HOSPITAL 11 - JP.ED | Age: 88
Discharge: HOME | End: 2023-06-17
Payer: MEDICARE

## 2023-06-17 VITALS — SYSTOLIC BLOOD PRESSURE: 151 MMHG | DIASTOLIC BLOOD PRESSURE: 64 MMHG | HEART RATE: 78 BPM

## 2023-06-17 DIAGNOSIS — I10: ICD-10-CM

## 2023-06-17 DIAGNOSIS — R33.8: ICD-10-CM

## 2023-06-17 DIAGNOSIS — Z79.899: ICD-10-CM

## 2023-06-17 DIAGNOSIS — Z79.82: ICD-10-CM

## 2023-06-17 DIAGNOSIS — I25.10: ICD-10-CM

## 2023-06-17 DIAGNOSIS — Z95.1: ICD-10-CM

## 2023-06-17 DIAGNOSIS — N40.1: Primary | ICD-10-CM

## 2023-06-17 DIAGNOSIS — K59.00: ICD-10-CM

## 2023-06-17 DIAGNOSIS — D64.9: ICD-10-CM

## 2023-06-17 LAB
ANION GAP SERPL CALC-SCNC: 12.9 MMOL/L (ref 5–14)
APPEARANCE UR: (no result)
BACTERIA URNS QL MICRO: (no result)
BASOPHILS # BLD AUTO: 0.03 K/UL (ref 0–0.1)
BASOPHILS NFR BLD AUTO: 0.3 % (ref 0.1–1.3)
BILIRUB UR STRIP-MCNC: NEGATIVE MG/DL
BUN SERPL-MCNC: 30 MG/DL (ref 7–18)
CALCIUM SERPL-MCNC: 9.2 MG/DL (ref 8.5–10.1)
CHLORIDE SERPL-SCNC: 100 MMOL/L (ref 100–108)
CO2 SERPL-SCNC: 28 MMOL/L (ref 21–32)
COLOR UR: YELLOW
CREAT CL 24H UR+SERPL-VRATE: 33.7 ML/MIN
CREAT SERPL-MCNC: 1.4 MG/DL (ref 0.8–1.3)
EOSINOPHIL # BLD AUTO: 0.18 K/UL (ref 0–0.4)
EOSINOPHIL NFR BLD AUTO: 1.8 % (ref 0–5.4)
EPI CELLS #/AREA URNS HPF: (no result) /[HPF]
GLUCOSE SERPL-MCNC: 123 MG/DL (ref 74–106)
GLUCOSE UR STRIP-MCNC: NEGATIVE MG/DL
HCT VFR BLD AUTO: 30.3 % (ref 38.4–49.7)
HGB BLD-MCNC: 10 G/DL (ref 12.9–16.9)
IMM GRANULOCYTES # BLD: 0.07 K/UL (ref 0–0.23)
IMM GRANULOCYTES NFR BLD: 0.7 % (ref 0–0.7)
KETONES UR STRIP-MCNC: NEGATIVE MG/DL
LYMPHOCYTES # BLD AUTO: 0.85 K/UL (ref 0.8–3.3)
LYMPHOCYTES NFR BLD AUTO: 8.7 % (ref 11.4–47.7)
MCH RBC QN AUTO: 29.6 PG (ref 31.6–35.5)
MCHC RBC AUTO-ENTMCNC: 33 G/DL (ref 31.6–35.5)
MCHC RBC AUTO-ENTMCNC: 89.6 FL (ref 81.4–99)
MONOCYTES # BLD AUTO: 0.86 K/UL (ref 0.2–0.9)
MONOCYTES NFR BLD AUTO: 8.8 % (ref 3.3–12.6)
MUCOUS THREADS URNS QL MICRO: (no result)
NEUTROPHILS # BLD AUTO: 7.82 K/UL (ref 1–7.6)
NEUTROPHILS NFR BLD AUTO: 79.7 % (ref 40–78.1)
NITRITE UR QL: NEGATIVE
OTHER ELEMENTS URNS MICRO: (no result)
PH UR STRIP: 5.5 [PH] (ref 5–8)
PLATELET # BLD AUTO: 244 K/UL (ref 130–375)
POTASSIUM SERPL-SCNC: 3.9 MMOL/L (ref 3.6–5.2)
PROT UR STRIP-MCNC: NEGATIVE MG/DL
RBC # BLD AUTO: 3.38 M/UL (ref 4.14–5.76)
RBC UR QL: (no result)
SODIUM SERPL-SCNC: 137 MMOL/L (ref 140–148)
SP GR UR STRIP: 1.02 (ref 1.01–1.03)
UROBILINOGEN UR STRIP-ACNC: 0.2 EU/DL (ref 0.2–1)
WBC # BLD AUTO: 9.8 K/UL (ref 3.2–11)
WBC UR QL: (no result) (ref 0–5)

## 2023-06-17 PROCEDURE — 87186 SC STD MICRODIL/AGAR DIL: CPT

## 2023-06-17 PROCEDURE — 80048 BASIC METABOLIC PNL TOTAL CA: CPT

## 2023-06-17 PROCEDURE — 87086 URINE CULTURE/COLONY COUNT: CPT

## 2023-06-17 PROCEDURE — 99284 EMERGENCY DEPT VISIT MOD MDM: CPT

## 2023-06-17 PROCEDURE — 87077 CULTURE AEROBIC IDENTIFY: CPT

## 2023-06-17 PROCEDURE — 87088 URINE BACTERIA CULTURE: CPT

## 2023-06-17 PROCEDURE — 36415 COLL VENOUS BLD VENIPUNCTURE: CPT

## 2023-06-17 PROCEDURE — 81001 URINALYSIS AUTO W/SCOPE: CPT

## 2023-06-17 PROCEDURE — 85025 COMPLETE CBC W/AUTO DIFF WBC: CPT

## 2023-06-17 PROCEDURE — 51702 INSERT TEMP BLADDER CATH: CPT

## 2023-07-14 ENCOUNTER — HOSPITAL ENCOUNTER (EMERGENCY)
Dept: HOSPITAL 11 - JP.ED | Age: 88
Discharge: HOME | End: 2023-07-14
Payer: MEDICARE

## 2023-07-14 VITALS — SYSTOLIC BLOOD PRESSURE: 135 MMHG | HEART RATE: 73 BPM | DIASTOLIC BLOOD PRESSURE: 59 MMHG

## 2023-07-14 DIAGNOSIS — Z86.16: ICD-10-CM

## 2023-07-14 DIAGNOSIS — I25.810: ICD-10-CM
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
## 2023-07-14 DIAGNOSIS — M19.90: ICD-10-CM

## 2023-07-14 DIAGNOSIS — S80.01XA: Primary | ICD-10-CM

## 2023-07-14 DIAGNOSIS — I10: ICD-10-CM

## 2023-07-14 DIAGNOSIS — Z79.82: ICD-10-CM

## 2023-07-14 DIAGNOSIS — Z79.899: ICD-10-CM

## 2023-07-14 PROCEDURE — 73562 X-RAY EXAM OF KNEE 3: CPT

## 2023-07-14 PROCEDURE — 99283 EMERGENCY DEPT VISIT LOW MDM: CPT

## 2023-07-14 PROCEDURE — 73502 X-RAY EXAM HIP UNI 2-3 VIEWS: CPT

## 2023-07-17 ENCOUNTER — HOSPITAL ENCOUNTER (EMERGENCY)
Dept: HOSPITAL 11 - JP.ED | Age: 88
LOS: 1 days | Discharge: HOME | End: 2023-07-18
Payer: MEDICARE

## 2023-07-17 VITALS — DIASTOLIC BLOOD PRESSURE: 67 MMHG | HEART RATE: 71 BPM | SYSTOLIC BLOOD PRESSURE: 142 MMHG

## 2023-07-17 DIAGNOSIS — I25.10: ICD-10-CM

## 2023-07-17 DIAGNOSIS — S00.03XA: Primary | ICD-10-CM

## 2023-07-17 DIAGNOSIS — Z79.82: ICD-10-CM

## 2023-07-17 DIAGNOSIS — R29.6: ICD-10-CM

## 2023-07-17 DIAGNOSIS — Z79.899: ICD-10-CM

## 2023-07-17 DIAGNOSIS — Z95.5: ICD-10-CM

## 2023-07-17 DIAGNOSIS — Z86.16: ICD-10-CM

## 2023-07-17 DIAGNOSIS — I10: ICD-10-CM

## 2023-07-17 DIAGNOSIS — S00.81XA: ICD-10-CM
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
## 2023-07-17 PROCEDURE — 99284 EMERGENCY DEPT VISIT MOD MDM: CPT

## 2023-07-17 PROCEDURE — 73562 X-RAY EXAM OF KNEE 3: CPT

## 2023-07-17 PROCEDURE — 70450 CT HEAD/BRAIN W/O DYE: CPT

## 2023-07-24 ENCOUNTER — HOSPITAL ENCOUNTER (INPATIENT)
Dept: HOSPITAL 11 - JP.ED | Age: 88
LOS: 4 days | Discharge: HOSPICE | DRG: 690 | End: 2023-07-28
Attending: INTERNAL MEDICINE | Admitting: INTERNAL MEDICINE
Payer: OTHER GOVERNMENT

## 2023-07-24 DIAGNOSIS — N40.1: ICD-10-CM

## 2023-07-24 DIAGNOSIS — F02.80: ICD-10-CM

## 2023-07-24 DIAGNOSIS — G89.29: ICD-10-CM

## 2023-07-24 DIAGNOSIS — N13.8: ICD-10-CM

## 2023-07-24 DIAGNOSIS — Z85.828: ICD-10-CM

## 2023-07-24 DIAGNOSIS — I50.9: ICD-10-CM

## 2023-07-24 DIAGNOSIS — G30.9: ICD-10-CM

## 2023-07-24 DIAGNOSIS — K21.9: ICD-10-CM

## 2023-07-24 DIAGNOSIS — M25.561: ICD-10-CM

## 2023-07-24 DIAGNOSIS — Z95.5: ICD-10-CM

## 2023-07-24 DIAGNOSIS — S51.811A: ICD-10-CM

## 2023-07-24 DIAGNOSIS — Z96.649: ICD-10-CM
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
## 2023-07-24 DIAGNOSIS — Z86.16: ICD-10-CM

## 2023-07-24 DIAGNOSIS — D63.1: ICD-10-CM

## 2023-07-24 DIAGNOSIS — F32.2: ICD-10-CM

## 2023-07-24 DIAGNOSIS — Z66: ICD-10-CM

## 2023-07-24 DIAGNOSIS — Z90.49: ICD-10-CM

## 2023-07-24 DIAGNOSIS — S00.03XA: ICD-10-CM

## 2023-07-24 DIAGNOSIS — E86.0: ICD-10-CM

## 2023-07-24 DIAGNOSIS — Z79.82: ICD-10-CM

## 2023-07-24 DIAGNOSIS — N39.0: Primary | ICD-10-CM

## 2023-07-24 DIAGNOSIS — E11.22: ICD-10-CM

## 2023-07-24 DIAGNOSIS — S21.109A: ICD-10-CM

## 2023-07-24 DIAGNOSIS — M25.562: ICD-10-CM

## 2023-07-24 DIAGNOSIS — N18.31: ICD-10-CM

## 2023-07-24 DIAGNOSIS — E87.1: ICD-10-CM

## 2023-07-24 DIAGNOSIS — Z74.1: ICD-10-CM

## 2023-07-24 DIAGNOSIS — Z95.1: ICD-10-CM

## 2023-07-24 DIAGNOSIS — Z97.3: ICD-10-CM

## 2023-07-24 DIAGNOSIS — I13.0: ICD-10-CM

## 2023-07-24 DIAGNOSIS — R29.6: ICD-10-CM

## 2023-07-24 DIAGNOSIS — G93.49: ICD-10-CM

## 2023-07-24 DIAGNOSIS — M54.2: ICD-10-CM

## 2023-07-24 DIAGNOSIS — Z95.2: ICD-10-CM

## 2023-07-24 DIAGNOSIS — I25.10: ICD-10-CM

## 2023-07-24 DIAGNOSIS — Z20.822: ICD-10-CM

## 2023-07-24 DIAGNOSIS — Z78.9: ICD-10-CM

## 2023-07-24 DIAGNOSIS — F41.9: ICD-10-CM

## 2023-07-24 DIAGNOSIS — M54.50: ICD-10-CM

## 2023-07-24 DIAGNOSIS — Z98.890: ICD-10-CM

## 2023-07-24 DIAGNOSIS — Z79.899: ICD-10-CM

## 2023-07-24 DIAGNOSIS — I12.9: ICD-10-CM

## 2023-07-24 LAB
ANION GAP SERPL CALC-SCNC: 14.6 MMOL/L (ref 5–14)
APPEARANCE UR: (no result)
BACTERIA URNS QL MICRO: (no result)
BASE EXCESS BLDV CALC-SCNC: -2.4 MM/L
BASOPHILS # BLD AUTO: 0.03 K/UL (ref 0–0.1)
BASOPHILS NFR BLD AUTO: 0.3 % (ref 0.1–1.3)
BILIRUB UR STRIP-MCNC: NEGATIVE MG/DL
BUN SERPL-MCNC: 37 MG/DL (ref 7–18)
CALCIUM SERPL-MCNC: 9 MG/DL (ref 8.5–10.1)
CHLORIDE SERPL-SCNC: 100 MMOL/L (ref 100–108)
CO2 SERPL-SCNC: 23 MMOL/L (ref 21–32)
COHGB MFR BLDA: 2.6 % (ref 0–1.6)
COLOR UR: YELLOW
CREAT CL 24H UR+SERPL-VRATE: 26.11 ML/MIN
CREAT SERPL-MCNC: 1.6 MG/DL (ref 0.8–1.3)
EOSINOPHIL # BLD AUTO: 0.15 K/UL (ref 0–0.4)
EOSINOPHIL NFR BLD AUTO: 1.6 % (ref 0–5.4)
EPI CELLS #/AREA URNS HPF: (no result) /[HPF]
FLUAV RNA UPPER RESP QL NAA+PROBE: NEGATIVE
FLUBV RNA UPPER RESP QL NAA+PROBE: NEGATIVE
GLUCOSE SERPL-MCNC: 118 MG/DL (ref 74–106)
GLUCOSE UR STRIP-MCNC: NEGATIVE MG/DL
HCO3 BLDV-SCNC: 21.4 MMOL/L
HCT VFR BLD AUTO: 32.6 % (ref 38.4–49.7)
HGB BLD-MCNC: 11.1 G/DL (ref 12.9–16.9)
HGB BLDA-MCNC: 11.3 G/DL (ref 13.5–18)
IMM GRANULOCYTES # BLD: 0.12 K/UL (ref 0–0.23)
IMM GRANULOCYTES NFR BLD: 1.2 % (ref 0–0.7)
KETONES UR STRIP-MCNC: NEGATIVE MG/DL
LYMPHOCYTES # BLD AUTO: 1.3 K/UL (ref 0.8–3.3)
LYMPHOCYTES NFR BLD AUTO: 13.5 % (ref 11.4–47.7)
MCH RBC QN AUTO: 29.1 PG (ref 31.6–35.5)
MCHC RBC AUTO-ENTMCNC: 34 G/DL (ref 31.6–35.5)
MCHC RBC AUTO-ENTMCNC: 85.6 FL (ref 81.4–99)
METHGB MFR BLDA: 1 %
MONOCYTES # BLD AUTO: 0.97 K/UL (ref 0.2–0.9)
MONOCYTES NFR BLD AUTO: 10.1 % (ref 3.3–12.6)
MUCOUS THREADS URNS QL MICRO: (no result)
NEUTROPHILS # BLD AUTO: 7.08 K/UL (ref 1–7.6)
NEUTROPHILS NFR BLD AUTO: 73.3 % (ref 40–78.1)
NITRITE UR QL: NEGATIVE
OXYHGB MFR BLDA: 71.8 %
PCO2 BLDV: 35.4 MM/HG
PH BLDV: 7.4 [PH] (ref 7.35–7.45)
PH UR STRIP: 5.5 [PH] (ref 5–8)
PLATELET # BLD AUTO: 264 K/UL (ref 130–375)
PO2 BLDV: 43.3 MM/HG
POTASSIUM SERPL-SCNC: 3.6 MMOL/L (ref 3.6–5.2)
PROT UR STRIP-MCNC: 30 MG/DL
RBC # BLD AUTO: 3.81 M/UL (ref 4.14–5.76)
RBC # URNS HPF: (no result) /ML (ref 0–5)
RBC UR QL: (no result)
RSV RNA UPPER RESP QL NAA+PROBE: NEGATIVE
SAO2 % BLDV: 74.5 %
SARS-COV-2 RNA RESP QL NAA+PROBE: NEGATIVE
SODIUM SERPL-SCNC: 134 MMOL/L (ref 140–148)
SP GR UR STRIP: 1.01 (ref 1.01–1.03)
TSH SERPL DL<=0.005 MIU/L-ACNC: 0.62 UIU/ML (ref 0.36–3.74)
UROBILINOGEN UR STRIP-ACNC: 0.2 EU/DL (ref 0.2–1)
VIT B12 SERPL-MCNC: 520 PG/ML (ref 193–986)
WBC # BLD AUTO: 9.7 K/UL (ref 3.2–11)
WBC UR QL: (no result) (ref 0–5)

## 2023-07-24 PROCEDURE — C9113 INJ PANTOPRAZOLE SODIUM, VIA: HCPCS

## 2023-07-24 RX ADMIN — HYDROCODONE BITARTRATE AND ACETAMINOPHEN PRN TAB: 10; 325 TABLET ORAL at 23:37

## 2023-07-25 LAB
ANION GAP SERPL CALC-SCNC: 13.6 MMOL/L (ref 5–14)
BASOPHILS # BLD AUTO: 0.02 K/UL (ref 0–0.1)
BASOPHILS NFR BLD AUTO: 0.3 % (ref 0.1–1.3)
BUN SERPL-MCNC: 27 MG/DL (ref 7–18)
CALCIUM SERPL-MCNC: 8.3 MG/DL (ref 8.5–10.1)
CHLORIDE SERPL-SCNC: 104 MMOL/L (ref 100–108)
CO2 SERPL-SCNC: 23 MMOL/L (ref 21–32)
CREAT CL 24H UR+SERPL-VRATE: 34.6 ML/MIN
CREAT SERPL-MCNC: 1.3 MG/DL (ref 0.8–1.3)
EOSINOPHIL # BLD AUTO: 0.27 K/UL (ref 0–0.4)
EOSINOPHIL NFR BLD AUTO: 3.5 % (ref 0–5.4)
GLUCOSE SERPL-MCNC: 106 MG/DL (ref 74–106)
HCT VFR BLD AUTO: 31.2 % (ref 38.4–49.7)
HGB BLD-MCNC: 10.5 G/DL (ref 12.9–16.9)
IMM GRANULOCYTES # BLD: 0.08 K/UL (ref 0–0.23)
IMM GRANULOCYTES NFR BLD: 1 % (ref 0–0.7)
LYMPHOCYTES # BLD AUTO: 1.88 K/UL (ref 0.8–3.3)
LYMPHOCYTES NFR BLD AUTO: 24.6 % (ref 11.4–47.7)
MCH RBC QN AUTO: 29.2 PG (ref 31.6–35.5)
MCHC RBC AUTO-ENTMCNC: 33.7 G/DL (ref 31.6–35.5)
MCHC RBC AUTO-ENTMCNC: 86.9 FL (ref 81.4–99)
MONOCYTES # BLD AUTO: 0.8 K/UL (ref 0.2–0.9)
MONOCYTES NFR BLD AUTO: 10.5 % (ref 3.3–12.6)
NEUTROPHILS # BLD AUTO: 4.6 K/UL (ref 1–7.6)
NEUTROPHILS NFR BLD AUTO: 60.1 % (ref 40–78.1)
PLATELET # BLD AUTO: 251 K/UL (ref 130–375)
POTASSIUM SERPL-SCNC: 3.6 MMOL/L (ref 3.6–5.2)
RBC # BLD AUTO: 3.59 M/UL (ref 4.14–5.76)
SODIUM SERPL-SCNC: 137 MMOL/L (ref 140–148)
WBC # BLD AUTO: 7.7 K/UL (ref 3.2–11)

## 2023-07-25 RX ADMIN — HYDROCODONE BITARTRATE AND ACETAMINOPHEN PRN TAB: 10; 325 TABLET ORAL at 19:50

## 2023-07-25 RX ADMIN — DILTIAZEM HYDROCHLORIDE SCH MG: 180 CAPSULE, COATED, EXTENDED RELEASE ORAL at 09:37

## 2023-07-26 LAB
ANION GAP SERPL CALC-SCNC: 14.3 MMOL/L (ref 5–14)
BASOPHILS # BLD AUTO: 0.03 K/UL (ref 0–0.1)
BASOPHILS NFR BLD AUTO: 0.4 % (ref 0.1–1.3)
BUN SERPL-MCNC: 22 MG/DL (ref 7–18)
CALCIUM SERPL-MCNC: 8.2 MG/DL (ref 8.5–10.1)
CHLORIDE SERPL-SCNC: 103 MMOL/L (ref 100–108)
CO2 SERPL-SCNC: 23 MMOL/L (ref 21–32)
CREAT CL 24H UR+SERPL-VRATE: 40.89 ML/MIN
CREAT SERPL-MCNC: 1.1 MG/DL (ref 0.8–1.3)
EOSINOPHIL # BLD AUTO: 0.4 K/UL (ref 0–0.4)
EOSINOPHIL NFR BLD AUTO: 4.8 % (ref 0–5.4)
GLUCOSE SERPL-MCNC: 113 MG/DL (ref 74–106)
HCT VFR BLD AUTO: 30.2 % (ref 38.4–49.7)
HGB BLD-MCNC: 10 G/DL (ref 12.9–16.9)
IMM GRANULOCYTES # BLD: 0.11 K/UL (ref 0–0.23)
IMM GRANULOCYTES NFR BLD: 1.3 % (ref 0–0.7)
LYMPHOCYTES # BLD AUTO: 1.78 K/UL (ref 0.8–3.3)
LYMPHOCYTES NFR BLD AUTO: 21.5 % (ref 11.4–47.7)
MCH RBC QN AUTO: 28.9 PG (ref 31.6–35.5)
MCHC RBC AUTO-ENTMCNC: 33.1 G/DL (ref 31.6–35.5)
MCHC RBC AUTO-ENTMCNC: 87.3 FL (ref 81.4–99)
MONOCYTES # BLD AUTO: 0.86 K/UL (ref 0.2–0.9)
MONOCYTES NFR BLD AUTO: 10.4 % (ref 3.3–12.6)
NEUTROPHILS # BLD AUTO: 5.09 K/UL (ref 1–7.6)
NEUTROPHILS NFR BLD AUTO: 61.6 % (ref 40–78.1)
PLATELET # BLD AUTO: 236 K/UL (ref 130–375)
POTASSIUM SERPL-SCNC: 4.3 MMOL/L (ref 3.6–5.2)
RBC # BLD AUTO: 3.46 M/UL (ref 4.14–5.76)
SODIUM SERPL-SCNC: 136 MMOL/L (ref 140–148)
WBC # BLD AUTO: 8.3 K/UL (ref 3.2–11)

## 2023-07-26 RX ADMIN — DILTIAZEM HYDROCHLORIDE SCH MG: 180 CAPSULE, COATED, EXTENDED RELEASE ORAL at 08:30

## 2023-07-26 RX ADMIN — HYDROCODONE BITARTRATE AND ACETAMINOPHEN PRN TAB: 10; 325 TABLET ORAL at 18:49

## 2023-07-26 RX ADMIN — HYDROCODONE BITARTRATE AND ACETAMINOPHEN PRN TAB: 10; 325 TABLET ORAL at 09:39

## 2023-07-27 RX ADMIN — DILTIAZEM HYDROCHLORIDE SCH MG: 180 CAPSULE, COATED, EXTENDED RELEASE ORAL at 08:37

## 2023-07-27 RX ADMIN — HYDROCODONE BITARTRATE AND ACETAMINOPHEN PRN TAB: 10; 325 TABLET ORAL at 05:52

## 2023-07-27 RX ADMIN — HYDROCODONE BITARTRATE AND ACETAMINOPHEN PRN TAB: 10; 325 TABLET ORAL at 13:35

## 2023-07-28 VITALS — DIASTOLIC BLOOD PRESSURE: 74 MMHG | SYSTOLIC BLOOD PRESSURE: 114 MMHG

## 2023-07-28 VITALS — HEART RATE: 64 BPM

## 2023-07-28 RX ADMIN — DILTIAZEM HYDROCHLORIDE SCH MG: 180 CAPSULE, COATED, EXTENDED RELEASE ORAL at 10:18
